# Patient Record
Sex: MALE | Race: WHITE | NOT HISPANIC OR LATINO | Employment: OTHER | URBAN - METROPOLITAN AREA
[De-identification: names, ages, dates, MRNs, and addresses within clinical notes are randomized per-mention and may not be internally consistent; named-entity substitution may affect disease eponyms.]

---

## 2017-01-18 ENCOUNTER — APPOINTMENT (EMERGENCY)
Dept: RADIOLOGY | Facility: HOSPITAL | Age: 57
End: 2017-01-18

## 2017-01-18 ENCOUNTER — HOSPITAL ENCOUNTER (EMERGENCY)
Facility: HOSPITAL | Age: 57
Discharge: HOME | End: 2017-01-18
Attending: EMERGENCY MEDICINE

## 2017-01-18 VITALS
HEIGHT: 72 IN | BODY MASS INDEX: 20.99 KG/M2 | OXYGEN SATURATION: 97 % | RESPIRATION RATE: 18 BRPM | TEMPERATURE: 97.8 F | SYSTOLIC BLOOD PRESSURE: 144 MMHG | DIASTOLIC BLOOD PRESSURE: 88 MMHG | WEIGHT: 155 LBS | HEART RATE: 78 BPM

## 2017-01-18 DIAGNOSIS — R55 SYNCOPE: Primary | ICD-10-CM

## 2017-01-18 DIAGNOSIS — M54.9 BACK PAIN: ICD-10-CM

## 2017-01-18 DIAGNOSIS — K74.60 CIRRHOSIS (HCC): ICD-10-CM

## 2017-01-18 LAB
ANION GAP SERPL CALCULATED.3IONS-SCNC: 10 MMOL/L (ref 4–13)
ATRIAL RATE: 78 BPM
BASOPHILS # BLD AUTO: 0.05 THOUSANDS/ΜL (ref 0–0.1)
BASOPHILS NFR BLD AUTO: 1 % (ref 0–1)
BUN SERPL-MCNC: 8 MG/DL (ref 5–25)
CALCIUM SERPL-MCNC: 8.9 MG/DL (ref 8.3–10.1)
CHLORIDE SERPL-SCNC: 108 MMOL/L (ref 100–108)
CO2 SERPL-SCNC: 24 MMOL/L (ref 21–32)
CREAT SERPL-MCNC: 0.72 MG/DL (ref 0.6–1.3)
EOSINOPHIL # BLD AUTO: 0.09 THOUSAND/ΜL (ref 0–0.61)
EOSINOPHIL NFR BLD AUTO: 2 % (ref 0–6)
ERYTHROCYTE [DISTWIDTH] IN BLOOD BY AUTOMATED COUNT: 14.1 % (ref 11.6–15.1)
GFR SERPL CREATININE-BSD FRML MDRD: >60 ML/MIN/1.73SQ M
GLUCOSE SERPL-MCNC: 124 MG/DL (ref 65–140)
HCT VFR BLD AUTO: 39.8 % (ref 36.5–49.3)
HGB BLD-MCNC: 13.8 G/DL (ref 12–17)
LYMPHOCYTES # BLD AUTO: 2.36 THOUSANDS/ΜL (ref 0.6–4.47)
LYMPHOCYTES NFR BLD AUTO: 43 % (ref 14–44)
MCH RBC QN AUTO: 34.6 PG (ref 26.8–34.3)
MCHC RBC AUTO-ENTMCNC: 34.7 G/DL (ref 31.4–37.4)
MCV RBC AUTO: 100 FL (ref 82–98)
MONOCYTES # BLD AUTO: 0.66 THOUSAND/ΜL (ref 0.17–1.22)
MONOCYTES NFR BLD AUTO: 12 % (ref 4–12)
NEUTROPHILS # BLD AUTO: 2.38 THOUSANDS/ΜL (ref 1.85–7.62)
NEUTS SEG NFR BLD AUTO: 42 % (ref 43–75)
NRBC BLD AUTO-RTO: 0 /100 WBCS
P AXIS: 37 DEGREES
PLATELET # BLD AUTO: 120 THOUSANDS/UL (ref 149–390)
PMV BLD AUTO: 10.3 FL (ref 8.9–12.7)
POTASSIUM SERPL-SCNC: 3.3 MMOL/L (ref 3.5–5.3)
PR INTERVAL: 154 MS
QRS AXIS: 11 DEGREES
QRSD INTERVAL: 94 MS
QT INTERVAL: 398 MS
QTC INTERVAL: 453 MS
RBC # BLD AUTO: 3.99 MILLION/UL (ref 3.88–5.62)
SODIUM SERPL-SCNC: 142 MMOL/L (ref 136–145)
SPECIMEN SOURCE: NORMAL
T WAVE AXIS: 49 DEGREES
TROPONIN I BLD-MCNC: 0 NG/ML (ref 0–0.08)
VENTRICULAR RATE: 78 BPM
WBC # BLD AUTO: 5.55 THOUSAND/UL (ref 4.31–10.16)

## 2017-01-18 PROCEDURE — 96374 THER/PROPH/DIAG INJ IV PUSH: CPT

## 2017-01-18 PROCEDURE — 74177 CT ABD & PELVIS W/CONTRAST: CPT

## 2017-01-18 PROCEDURE — 36415 COLL VENOUS BLD VENIPUNCTURE: CPT

## 2017-01-18 PROCEDURE — 80048 BASIC METABOLIC PNL TOTAL CA: CPT

## 2017-01-18 PROCEDURE — 93005 ELECTROCARDIOGRAM TRACING: CPT

## 2017-01-18 PROCEDURE — 71020 HB CHEST X-RAY 2VW FRONTAL&LATL: CPT

## 2017-01-18 PROCEDURE — 85025 COMPLETE CBC W/AUTO DIFF WBC: CPT

## 2017-01-18 PROCEDURE — 99285 EMERGENCY DEPT VISIT HI MDM: CPT

## 2017-01-18 PROCEDURE — 70450 CT HEAD/BRAIN W/O DYE: CPT

## 2017-01-18 PROCEDURE — 84484 ASSAY OF TROPONIN QUANT: CPT

## 2017-01-18 PROCEDURE — 72125 CT NECK SPINE W/O DYE: CPT

## 2017-01-18 RX ORDER — KETOROLAC TROMETHAMINE 30 MG/ML
15 INJECTION, SOLUTION INTRAMUSCULAR; INTRAVENOUS ONCE
Status: COMPLETED | OUTPATIENT
Start: 2017-01-18 | End: 2017-01-18

## 2017-01-18 RX ADMIN — KETOROLAC TROMETHAMINE 15 MG: 30 INJECTION, SOLUTION INTRAMUSCULAR at 16:36

## 2017-01-18 RX ADMIN — IOHEXOL 100 ML: 350 INJECTION, SOLUTION INTRAVENOUS at 15:37

## 2018-03-08 ENCOUNTER — APPOINTMENT (INPATIENT)
Dept: RADIOLOGY | Facility: HOSPITAL | Age: 58
DRG: 369 | End: 2018-03-08
Payer: OTHER GOVERNMENT

## 2018-03-08 ENCOUNTER — APPOINTMENT (INPATIENT)
Dept: NON INVASIVE DIAGNOSTICS | Facility: HOSPITAL | Age: 58
DRG: 369 | End: 2018-03-08
Payer: OTHER GOVERNMENT

## 2018-03-08 ENCOUNTER — APPOINTMENT (EMERGENCY)
Dept: RADIOLOGY | Facility: HOSPITAL | Age: 58
DRG: 369 | End: 2018-03-08
Payer: OTHER GOVERNMENT

## 2018-03-08 ENCOUNTER — ANESTHESIA (INPATIENT)
Dept: GASTROENTEROLOGY | Facility: HOSPITAL | Age: 58
DRG: 369 | End: 2018-03-08
Payer: OTHER GOVERNMENT

## 2018-03-08 ENCOUNTER — HOSPITAL ENCOUNTER (INPATIENT)
Facility: HOSPITAL | Age: 58
LOS: 2 days | Discharge: HOME | DRG: 369 | End: 2018-03-10
Attending: EMERGENCY MEDICINE | Admitting: INTERNAL MEDICINE
Payer: OTHER GOVERNMENT

## 2018-03-08 DIAGNOSIS — D62 ACUTE BLOOD LOSS ANEMIA: Primary | ICD-10-CM

## 2018-03-08 DIAGNOSIS — I20.9 ANGINA PECTORIS (HCC): ICD-10-CM

## 2018-03-08 DIAGNOSIS — F17.200 NICOTINE DEPENDENCE: Chronic | ICD-10-CM

## 2018-03-08 DIAGNOSIS — K92.2 GI BLEED: ICD-10-CM

## 2018-03-08 DIAGNOSIS — K74.60 HEPATIC CIRRHOSIS (HCC): ICD-10-CM

## 2018-03-08 PROBLEM — B18.2 CHRONIC HEPATITIS C VIRUS INFECTION (HCC): Status: ACTIVE | Noted: 2018-03-08

## 2018-03-08 PROBLEM — R65.10 SIRS (SYSTEMIC INFLAMMATORY RESPONSE SYNDROME) (HCC): Status: ACTIVE | Noted: 2018-03-08

## 2018-03-08 PROBLEM — B18.2 CHRONIC HEPATITIS C VIRUS INFECTION (HCC): Chronic | Status: ACTIVE | Noted: 2018-03-08

## 2018-03-08 PROBLEM — M79.89 LEG SWELLING: Status: ACTIVE | Noted: 2018-03-08

## 2018-03-08 PROBLEM — R07.9 CHEST PAIN: Status: ACTIVE | Noted: 2018-03-08

## 2018-03-08 PROBLEM — D50.0 BLOOD LOSS ANEMIA: Status: ACTIVE | Noted: 2018-03-08

## 2018-03-08 LAB
ABO GROUP BLD BPU: NORMAL
ABO GROUP BLD: NORMAL
ALBUMIN SERPL BCP-MCNC: 2.7 G/DL (ref 3.5–5)
ALP SERPL-CCNC: 137 U/L (ref 46–116)
ALT SERPL W P-5'-P-CCNC: 80 U/L (ref 12–78)
AMPHETAMINES SERPL QL SCN: POSITIVE
ANION GAP BLD CALC-SCNC: 13 MMOL/L (ref 4–13)
ANION GAP SERPL CALCULATED.3IONS-SCNC: 4 MMOL/L (ref 4–13)
ANION GAP SERPL CALCULATED.3IONS-SCNC: 7 MMOL/L (ref 4–13)
ANION GAP SERPL CALCULATED.3IONS-SCNC: 9 MMOL/L (ref 4–13)
APTT PPP: 28 SECONDS (ref 23–35)
AST SERPL W P-5'-P-CCNC: 104 U/L (ref 5–45)
ATRIAL RATE: 71 BPM
ATRIAL RATE: 95 BPM
BARBITURATES UR QL: NEGATIVE
BASOPHILS # BLD AUTO: 0.03 THOUSANDS/ΜL (ref 0–0.1)
BASOPHILS NFR BLD AUTO: 1 % (ref 0–1)
BENZODIAZ UR QL: NEGATIVE
BILIRUB SERPL-MCNC: 0.35 MG/DL (ref 0.2–1)
BLD GP AB SCN SERPL QL: NEGATIVE
BPU ID: NORMAL
BUN BLD-MCNC: 28 MG/DL (ref 5–25)
BUN SERPL-MCNC: 14 MG/DL (ref 5–25)
BUN SERPL-MCNC: 20 MG/DL (ref 5–25)
BUN SERPL-MCNC: 23 MG/DL (ref 5–25)
CA-I BLD-SCNC: 1.14 MMOL/L (ref 1.12–1.32)
CALCIUM SERPL-MCNC: 7.5 MG/DL (ref 8.3–10.1)
CALCIUM SERPL-MCNC: 8 MG/DL (ref 8.3–10.1)
CALCIUM SERPL-MCNC: 8.3 MG/DL (ref 8.3–10.1)
CHLORIDE BLD-SCNC: 104 MMOL/L (ref 100–108)
CHLORIDE SERPL-SCNC: 106 MMOL/L (ref 100–108)
CHLORIDE SERPL-SCNC: 106 MMOL/L (ref 100–108)
CHLORIDE SERPL-SCNC: 108 MMOL/L (ref 100–108)
CHOLEST SERPL-MCNC: 95 MG/DL (ref 50–200)
CO2 SERPL-SCNC: 22 MMOL/L (ref 21–32)
CO2 SERPL-SCNC: 24 MMOL/L (ref 21–32)
CO2 SERPL-SCNC: 25 MMOL/L (ref 21–32)
COCAINE UR QL: NEGATIVE
CREAT BLD-MCNC: 0.9 MG/DL (ref 0.6–1.3)
CREAT SERPL-MCNC: 0.8 MG/DL (ref 0.6–1.3)
CREAT SERPL-MCNC: 0.84 MG/DL (ref 0.6–1.3)
CREAT SERPL-MCNC: 0.94 MG/DL (ref 0.6–1.3)
CROSSMATCH: NORMAL
EOSINOPHIL # BLD AUTO: 0.1 THOUSAND/ΜL (ref 0–0.61)
EOSINOPHIL NFR BLD AUTO: 2 % (ref 0–6)
ERYTHROCYTE [DISTWIDTH] IN BLOOD BY AUTOMATED COUNT: 16.1 % (ref 11.6–15.1)
ERYTHROCYTE [DISTWIDTH] IN BLOOD BY AUTOMATED COUNT: 16.8 % (ref 11.6–15.1)
GFR SERPL CREATININE-BSD FRML MDRD: 100 ML/MIN/1.73SQ M
GFR SERPL CREATININE-BSD FRML MDRD: 90 ML/MIN/1.73SQ M
GFR SERPL CREATININE-BSD FRML MDRD: 95 ML/MIN/1.73SQ M
GFR SERPL CREATININE-BSD FRML MDRD: 98 ML/MIN/1.73SQ M
GLUCOSE SERPL-MCNC: 103 MG/DL (ref 65–140)
GLUCOSE SERPL-MCNC: 106 MG/DL (ref 65–140)
GLUCOSE SERPL-MCNC: 110 MG/DL (ref 65–140)
GLUCOSE SERPL-MCNC: 115 MG/DL (ref 65–140)
HCT VFR BLD AUTO: 17 % (ref 36.5–49.3)
HCT VFR BLD AUTO: 19 % (ref 36.5–49.3)
HCT VFR BLD AUTO: 20.2 % (ref 36.5–49.3)
HCT VFR BLD AUTO: 21.6 % (ref 36.5–49.3)
HCT VFR BLD CALC: 15 % (ref 36.5–49.3)
HDLC SERPL-MCNC: 31 MG/DL (ref 40–60)
HGB BLD-MCNC: 5.5 G/DL (ref 12–17)
HGB BLD-MCNC: 6.1 G/DL (ref 12–17)
HGB BLD-MCNC: 6.8 G/DL (ref 12–17)
HGB BLD-MCNC: 7.1 G/DL (ref 12–17)
HGB BLDA-MCNC: 5.1 G/DL (ref 12–17)
INR PPP: 1.19 (ref 0.86–1.16)
LDLC SERPL CALC-MCNC: 46 MG/DL (ref 0–100)
LYMPHOCYTES # BLD AUTO: 2.48 THOUSANDS/ΜL (ref 0.6–4.47)
LYMPHOCYTES NFR BLD AUTO: 39 % (ref 14–44)
MAGNESIUM SERPL-MCNC: 1.8 MG/DL (ref 1.6–2.6)
MCH RBC QN AUTO: 27.2 PG (ref 26.8–34.3)
MCH RBC QN AUTO: 27.2 PG (ref 26.8–34.3)
MCHC RBC AUTO-ENTMCNC: 32.1 G/DL (ref 31.4–37.4)
MCHC RBC AUTO-ENTMCNC: 32.4 G/DL (ref 31.4–37.4)
MCV RBC AUTO: 84 FL (ref 82–98)
MCV RBC AUTO: 85 FL (ref 82–98)
METHADONE UR QL: NEGATIVE
MONOCYTES # BLD AUTO: 0.91 THOUSAND/ΜL (ref 0.17–1.22)
MONOCYTES NFR BLD AUTO: 14 % (ref 4–12)
NEUTROPHILS # BLD AUTO: 2.82 THOUSANDS/ΜL (ref 1.85–7.62)
NEUTS SEG NFR BLD AUTO: 44 % (ref 43–75)
NRBC BLD AUTO-RTO: 0 /100 WBCS
NT-PROBNP SERPL-MCNC: 348 PG/ML
OPIATES UR QL SCN: NEGATIVE
P AXIS: 66 DEGREES
P AXIS: 80 DEGREES
PCO2 BLD: 25 MMOL/L (ref 21–32)
PCP UR QL: NEGATIVE
PLATELET # BLD AUTO: 148 THOUSANDS/UL (ref 149–390)
PLATELET # BLD AUTO: 163 THOUSANDS/UL (ref 149–390)
PMV BLD AUTO: 8.7 FL (ref 8.9–12.7)
PMV BLD AUTO: 9.3 FL (ref 8.9–12.7)
POTASSIUM BLD-SCNC: 4.3 MMOL/L (ref 3.5–5.3)
POTASSIUM SERPL-SCNC: 3.6 MMOL/L (ref 3.5–5.3)
POTASSIUM SERPL-SCNC: 3.6 MMOL/L (ref 3.5–5.3)
POTASSIUM SERPL-SCNC: 3.8 MMOL/L (ref 3.5–5.3)
PR INTERVAL: 132 MS
PR INTERVAL: 144 MS
PROT SERPL-MCNC: 6.9 G/DL (ref 6.4–8.2)
PROTHROMBIN TIME: 15.2 SECONDS (ref 12.1–14.4)
QRS AXIS: 52 DEGREES
QRS AXIS: 66 DEGREES
QRSD INTERVAL: 92 MS
QRSD INTERVAL: 96 MS
QT INTERVAL: 372 MS
QT INTERVAL: 428 MS
QTC INTERVAL: 465 MS
QTC INTERVAL: 467 MS
RBC # BLD AUTO: 2.02 MILLION/UL (ref 3.88–5.62)
RBC # BLD AUTO: 2.24 MILLION/UL (ref 3.88–5.62)
RH BLD: POSITIVE
SODIUM BLD-SCNC: 138 MMOL/L (ref 136–145)
SODIUM SERPL-SCNC: 137 MMOL/L (ref 136–145)
SPECIMEN EXPIRATION DATE: NORMAL
SPECIMEN SOURCE: ABNORMAL
T WAVE AXIS: 52 DEGREES
T WAVE AXIS: 60 DEGREES
THC UR QL: NEGATIVE
TRIGL SERPL-MCNC: 89 MG/DL
TROPONIN I SERPL-MCNC: 0.03 NG/ML
TROPONIN I SERPL-MCNC: 0.04 NG/ML
TROPONIN I SERPL-MCNC: 0.05 NG/ML
UNIT DISPENSE STATUS: NORMAL
UNIT PRODUCT CODE: NORMAL
UNIT RH: NORMAL
VENTRICULAR RATE: 71 BPM
VENTRICULAR RATE: 95 BPM
WBC # BLD AUTO: 6.36 THOUSAND/UL (ref 4.31–10.16)
WBC # BLD AUTO: 7.88 THOUSAND/UL (ref 4.31–10.16)

## 2018-03-08 PROCEDURE — 85014 HEMATOCRIT: CPT

## 2018-03-08 PROCEDURE — 80048 BASIC METABOLIC PNL TOTAL CA: CPT | Performed by: INTERNAL MEDICINE

## 2018-03-08 PROCEDURE — 86920 COMPATIBILITY TEST SPIN: CPT

## 2018-03-08 PROCEDURE — 83880 ASSAY OF NATRIURETIC PEPTIDE: CPT | Performed by: EMERGENCY MEDICINE

## 2018-03-08 PROCEDURE — 43255 EGD CONTROL BLEEDING ANY: CPT | Performed by: INTERNAL MEDICINE

## 2018-03-08 PROCEDURE — 93005 ELECTROCARDIOGRAM TRACING: CPT

## 2018-03-08 PROCEDURE — 84484 ASSAY OF TROPONIN QUANT: CPT | Performed by: INTERNAL MEDICINE

## 2018-03-08 PROCEDURE — 99222 1ST HOSP IP/OBS MODERATE 55: CPT | Performed by: INTERNAL MEDICINE

## 2018-03-08 PROCEDURE — 82272 OCCULT BLD FECES 1-3 TESTS: CPT

## 2018-03-08 PROCEDURE — 93005 ELECTROCARDIOGRAM TRACING: CPT | Performed by: INTERNAL MEDICINE

## 2018-03-08 PROCEDURE — 85610 PROTHROMBIN TIME: CPT | Performed by: EMERGENCY MEDICINE

## 2018-03-08 PROCEDURE — 0W3P8ZZ CONTROL BLEEDING IN GASTROINTESTINAL TRACT, VIA NATURAL OR ARTIFICIAL OPENING ENDOSCOPIC: ICD-10-PCS | Performed by: INTERNAL MEDICINE

## 2018-03-08 PROCEDURE — 93306 TTE W/DOPPLER COMPLETE: CPT

## 2018-03-08 PROCEDURE — 85027 COMPLETE CBC AUTOMATED: CPT | Performed by: INTERNAL MEDICINE

## 2018-03-08 PROCEDURE — 80307 DRUG TEST PRSMV CHEM ANLYZR: CPT | Performed by: INTERNAL MEDICINE

## 2018-03-08 PROCEDURE — 36415 COLL VENOUS BLD VENIPUNCTURE: CPT | Performed by: EMERGENCY MEDICINE

## 2018-03-08 PROCEDURE — 80053 COMPREHEN METABOLIC PANEL: CPT | Performed by: EMERGENCY MEDICINE

## 2018-03-08 PROCEDURE — 76706 US ABDL AORTA SCREEN AAA: CPT

## 2018-03-08 PROCEDURE — C9113 INJ PANTOPRAZOLE SODIUM, VIA: HCPCS | Performed by: INTERNAL MEDICINE

## 2018-03-08 PROCEDURE — 85025 COMPLETE CBC W/AUTO DIFF WBC: CPT | Performed by: EMERGENCY MEDICINE

## 2018-03-08 PROCEDURE — 80061 LIPID PANEL: CPT | Performed by: INTERNAL MEDICINE

## 2018-03-08 PROCEDURE — 85730 THROMBOPLASTIN TIME PARTIAL: CPT | Performed by: EMERGENCY MEDICINE

## 2018-03-08 PROCEDURE — 36415 COLL VENOUS BLD VENIPUNCTURE: CPT

## 2018-03-08 PROCEDURE — 85014 HEMATOCRIT: CPT | Performed by: INTERNAL MEDICINE

## 2018-03-08 PROCEDURE — 86901 BLOOD TYPING SEROLOGIC RH(D): CPT | Performed by: EMERGENCY MEDICINE

## 2018-03-08 PROCEDURE — 99285 EMERGENCY DEPT VISIT HI MDM: CPT

## 2018-03-08 PROCEDURE — 71046 X-RAY EXAM CHEST 2 VIEWS: CPT

## 2018-03-08 PROCEDURE — 86900 BLOOD TYPING SEROLOGIC ABO: CPT | Performed by: EMERGENCY MEDICINE

## 2018-03-08 PROCEDURE — 30233N1 TRANSFUSION OF NONAUTOLOGOUS RED BLOOD CELLS INTO PERIPHERAL VEIN, PERCUTANEOUS APPROACH: ICD-10-PCS | Performed by: INTERNAL MEDICINE

## 2018-03-08 PROCEDURE — 83735 ASSAY OF MAGNESIUM: CPT | Performed by: INTERNAL MEDICINE

## 2018-03-08 PROCEDURE — P9021 RED BLOOD CELLS UNIT: HCPCS

## 2018-03-08 PROCEDURE — 84484 ASSAY OF TROPONIN QUANT: CPT | Performed by: EMERGENCY MEDICINE

## 2018-03-08 PROCEDURE — 0DB58ZX EXCISION OF ESOPHAGUS, VIA NATURAL OR ARTIFICIAL OPENING ENDOSCOPIC, DIAGNOSTIC: ICD-10-PCS | Performed by: INTERNAL MEDICINE

## 2018-03-08 PROCEDURE — 80047 BASIC METABLC PNL IONIZED CA: CPT

## 2018-03-08 PROCEDURE — 36430 TRANSFUSION BLD/BLD COMPNT: CPT

## 2018-03-08 PROCEDURE — 86850 RBC ANTIBODY SCREEN: CPT | Performed by: EMERGENCY MEDICINE

## 2018-03-08 PROCEDURE — 85018 HEMOGLOBIN: CPT | Performed by: INTERNAL MEDICINE

## 2018-03-08 RX ORDER — PANTOPRAZOLE SODIUM 40 MG/1
40 INJECTION, POWDER, FOR SOLUTION INTRAVENOUS EVERY 12 HOURS SCHEDULED
Status: DISCONTINUED | OUTPATIENT
Start: 2018-03-08 | End: 2018-03-08

## 2018-03-08 RX ORDER — PROPOFOL 10 MG/ML
INJECTION, EMULSION INTRAVENOUS AS NEEDED
Status: DISCONTINUED | OUTPATIENT
Start: 2018-03-08 | End: 2018-03-08 | Stop reason: SURG

## 2018-03-08 RX ORDER — SODIUM CHLORIDE 9 MG/ML
50 INJECTION, SOLUTION INTRAVENOUS CONTINUOUS
Status: DISCONTINUED | OUTPATIENT
Start: 2018-03-08 | End: 2018-03-08

## 2018-03-08 RX ORDER — EPINEPHRINE 1 MG/ML
INJECTION, SOLUTION, CONCENTRATE INTRAVENOUS AS NEEDED
Status: DISCONTINUED | OUTPATIENT
Start: 2018-03-08 | End: 2018-03-08 | Stop reason: HOSPADM

## 2018-03-08 RX ORDER — ASPIRIN 81 MG/1
81 TABLET, CHEWABLE ORAL DAILY
Status: DISCONTINUED | OUTPATIENT
Start: 2018-03-09 | End: 2018-03-08

## 2018-03-08 RX ORDER — NADOLOL 20 MG/1
10 TABLET ORAL DAILY
Status: DISCONTINUED | OUTPATIENT
Start: 2018-03-08 | End: 2018-03-10 | Stop reason: HOSPADM

## 2018-03-08 RX ORDER — ATORVASTATIN CALCIUM 40 MG/1
40 TABLET, FILM COATED ORAL
Status: DISCONTINUED | OUTPATIENT
Start: 2018-03-08 | End: 2018-03-09

## 2018-03-08 RX ORDER — LIDOCAINE HYDROCHLORIDE 10 MG/ML
INJECTION, SOLUTION INFILTRATION; PERINEURAL AS NEEDED
Status: DISCONTINUED | OUTPATIENT
Start: 2018-03-08 | End: 2018-03-08 | Stop reason: SURG

## 2018-03-08 RX ORDER — NICOTINE 21 MG/24HR
14 PATCH, TRANSDERMAL 24 HOURS TRANSDERMAL DAILY
Status: DISCONTINUED | OUTPATIENT
Start: 2018-03-09 | End: 2018-03-10 | Stop reason: HOSPADM

## 2018-03-08 RX ORDER — NITROGLYCERIN 0.4 MG/1
0.4 TABLET SUBLINGUAL
Status: DISCONTINUED | OUTPATIENT
Start: 2018-03-08 | End: 2018-03-10 | Stop reason: HOSPADM

## 2018-03-08 RX ORDER — SUCCINYLCHOLINE CHLORIDE 20 MG/ML
INJECTION INTRAMUSCULAR; INTRAVENOUS AS NEEDED
Status: DISCONTINUED | OUTPATIENT
Start: 2018-03-08 | End: 2018-03-08 | Stop reason: SURG

## 2018-03-08 RX ORDER — CIPROFLOXACIN 2 MG/ML
400 INJECTION, SOLUTION INTRAVENOUS EVERY 12 HOURS
Status: DISCONTINUED | OUTPATIENT
Start: 2018-03-08 | End: 2018-03-08

## 2018-03-08 RX ORDER — ACETAMINOPHEN 325 MG/1
650 TABLET ORAL EVERY 6 HOURS PRN
Status: DISCONTINUED | OUTPATIENT
Start: 2018-03-08 | End: 2018-03-08

## 2018-03-08 RX ORDER — ONDANSETRON 2 MG/ML
INJECTION INTRAMUSCULAR; INTRAVENOUS AS NEEDED
Status: DISCONTINUED | OUTPATIENT
Start: 2018-03-08 | End: 2018-03-08 | Stop reason: SURG

## 2018-03-08 RX ORDER — ACETAMINOPHEN 325 MG/1
650 TABLET ORAL EVERY 8 HOURS PRN
Status: DISCONTINUED | OUTPATIENT
Start: 2018-03-08 | End: 2018-03-10 | Stop reason: HOSPADM

## 2018-03-08 RX ADMIN — SODIUM CHLORIDE: 0.9 INJECTION, SOLUTION INTRAVENOUS at 13:31

## 2018-03-08 RX ADMIN — ACETAMINOPHEN 650 MG: 325 TABLET, FILM COATED ORAL at 03:35

## 2018-03-08 RX ADMIN — CEFTRIAXONE 1000 MG: 1 INJECTION, SOLUTION INTRAVENOUS at 18:30

## 2018-03-08 RX ADMIN — ATORVASTATIN CALCIUM 40 MG: 40 TABLET, FILM COATED ORAL at 17:17

## 2018-03-08 RX ADMIN — PANTOPRAZOLE SODIUM 40 MG: 40 INJECTION, POWDER, FOR SOLUTION INTRAVENOUS at 08:48

## 2018-03-08 RX ADMIN — CIPROFLOXACIN 400 MG: 2 INJECTION, SOLUTION INTRAVENOUS at 08:48

## 2018-03-08 RX ADMIN — NADOLOL 10 MG: 20 TABLET ORAL at 18:53

## 2018-03-08 RX ADMIN — SUCCINYLCHOLINE CHLORIDE 100 MG: 20 INJECTION, SOLUTION INTRAMUSCULAR; INTRAVENOUS at 13:53

## 2018-03-08 RX ADMIN — LIDOCAINE HYDROCHLORIDE 50 MG: 10 INJECTION, SOLUTION INFILTRATION; PERINEURAL at 13:53

## 2018-03-08 RX ADMIN — PROPOFOL 200 MG: 10 INJECTION, EMULSION INTRAVENOUS at 13:53

## 2018-03-08 RX ADMIN — ONDANSETRON 4 MG: 2 INJECTION INTRAMUSCULAR; INTRAVENOUS at 13:56

## 2018-03-08 RX ADMIN — DEXAMETHASONE SODIUM PHOSPHATE 10 MG: 10 INJECTION INTRAMUSCULAR; INTRAVENOUS at 13:56

## 2018-03-08 RX ADMIN — SODIUM CHLORIDE 8 MG/HR: 9 INJECTION, SOLUTION INTRAVENOUS at 17:17

## 2018-03-08 NOTE — CONSULTS
Consultation -  [facility]  Molina Marshall 65 y o  male MRN: [id number]  Unit/Bed#: SPR 2 Encounter: [id number]        ASSESSMENT/PLAN:   GI bleed  Hep C & alcoholic cirrhosis  Screening colon    1  GI bleed - he presented to ER b/c of SOB & was found to be anemic & heme +  He reports sxs over the past week, Hbg 1 yr ago WNL's  BUN WNL's  Likely bled previously & has since stopped  Hbg on admission 5 5 -> 2 units -> 6 8  He is to receive 1 more  I would recommend EGD for further evaluation  Unlikely this is from EV, probably gastritis, PUD  -Continue Protonix drip  -Continue Abx  -NPO  -EGD later today    2  Hep C & alcoholic cirrhosis- he has had no f/u & continues to drink  MELD 11  No ascites or HE on exam  Labs consistent with cirrhosis  Hep C viral load pending  US pending   -Alcohol abstinence - watch for withdraw  -Outpt f/u for possible Hep C workup  -Follow-up US    3  Screening colon - he has never had a screening colon & has a FH of colon CA  Likely this can be done as outpt but if EGD is (-) can consider into workup  Consults    Reason for Consult / Principal Problem: Blood loss anemia    HPI: Molina Marshall is a 65y o  year old male with a PMH of Hep C (not treated), alcoholic cirrhosis who was admitted with SOB & lower extremity edema  He states the SOB is with exertion  He has noticed it worsening over the past week  He states he was told he has cirrhosis last yr during an admission for another reason  He states he has not seen anyone for this since then  He continues to drink & his last drink was yesterday 1 beer & 1 shot  He denies increasing abdominal distention, no episodes of confusion  He states he has noticed black stool over the past week as well  No BRBPR  No abd pain  He had 1 episode of N/V 1 week ago & did see some red blood, but has had no further episodes  Heme + in ER  He has never had an EGD or colonoscopy  He states his mother had colon CA         Review of Systems: as per HPI  Review of Systems   Cardiovascular: Negative for chest pain  All other systems reviewed and are negative  Historical Information   Past Medical History:   Diagnosis Date    Back pain, chronic     Cirrhosis ([address] )     Hepatitis      History reviewed  No pertinent surgical history  Social History   History   Alcohol Use    Yes     Comment: socially     History   Drug Use No     History   Smoking Status    Current Every Day Smoker    Packs/day: 0 50   Smokeless Tobacco    Not on file     History reviewed  No pertinent family history  Meds/Allergies       (Not in a hospital admission)  Current Facility-Administered Medications   Medication Dose Route Frequency    acetaminophen (TYLENOL) tablet 650 mg  650 mg Oral Q8H PRN    atorvastatin (LIPITOR) tablet 40 mg  40 mg Oral Daily With Dinner    ciprofloxacin (CIPRO) IVPB (premix) 400 mg  400 mg Intravenous Q12H    nitroglycerin (NITROSTAT) SL tablet 0 4 mg  0 4 mg Sublingual Q5 Min PRN    pantoprazole (PROTONIX) injection 40 mg  40 mg Intravenous Q12H [facility]    sodium chloride 0 9 % infusion  50 mL/hr Intravenous Continuous       No Known Allergies    Objective     Blood pressure 146/80, pulse 81, temperature 98 °F (36 7 °C), temperature source Oral, resp  rate 18, weight 72 6 kg (160 lb), SpO2 98 %  Intake/Output Summary (Last 24 hours) at 03/08/18 1021  Last data filed at 03/08/18 0617   Gross per 24 hour   Intake              700 ml   Output                0 ml   Net              700 ml       PHYSICAL EXAM     Physical Exam   Constitutional: He is oriented to person, place, and time  He appears well-developed and well-nourished  No distress  HENT:   Head: Normocephalic and atraumatic  Eyes: Conjunctivae and EOM are normal    Neck: Normal range of motion  Cardiovascular: Normal rate and regular rhythm  Pulmonary/Chest: Effort normal and breath sounds normal    Abdominal: Soft   Bowel sounds are normal  He exhibits no distension  There is no tenderness  Musculoskeletal: Normal range of motion  He exhibits edema (trace)  Neurological: He is alert and oriented to person, place, and time  Skin: Skin is warm and dry  Psychiatric: He has a normal mood and affect  His behavior is normal        Lab Results:   CBC: Lab Results   Component Value Date    WBC 7 88 03/08/2018    HGB 6 8 (LL) 03/08/2018    HCT 20 2 (L) 03/08/2018    MCV 85 03/08/2018     (L) 03/08/2018    MCH 27 2 03/08/2018    MCHC 32 1 03/08/2018    RDW 16 1 (H) 03/08/2018    MPV 8 7 (L) 03/08/2018    NRBC 0 03/08/2018   ,   CMP: Lab Results   Component Value Date     03/08/2018    K 3 6 03/08/2018     03/08/2018    CO2 22 03/08/2018    ANIONGAP 9 03/08/2018    BUN 20 03/08/2018    CREATININE 0 84 03/08/2018    GLUCOSE 115 03/08/2018    GLUCOSE 110 03/08/2018    CALCIUM 8 0 (L) 03/08/2018     (H) 03/08/2018    ALT 80 (H) 03/08/2018    ALKPHOS 137 (H) 03/08/2018    PROT 6 9 03/08/2018    BILITOT 0 35 03/08/2018    EGFR 98 03/08/2018    EGFR 95 03/08/2018   ,   Lipase: No results found for: LIPASE,  PT/INR:   Lab Results   Component Value Date    INR 1 19 (H) 03/08/2018   ,   Troponin:   Lab Results   Component Value Date    TROPONINI 0 03 03/08/2018   ,   Imaging Studies: I have personally reviewed pertinent reports        CXR:  No acute cardiopulmonary disease

## 2018-03-08 NOTE — ED NOTES
Patient in Ultrasound and will be transported directly to GI lab after Ultrasound       Beatriz Pa RN  03/08/18 8443

## 2018-03-08 NOTE — H&P
INTERNAL MEDICINE HISTORY AND PHYSICAL  SPR 2 SOD Team A    NAME: Kassie Guzman  AGE: 65 y o  SEX: male  : 1960   MRN: [id number]  ENCOUNTER: [id number]    DATE: 3/8/2018  TIME: 3:16 AM    Primary Care Physician: No primary care provider on file  Admitting Provider: Marnie Mijares DO    Chief complaint:  Acute blood loss anemia/heme-positive stools    History of Present Illness     Dot Avendaño is a 65 y o  male with a past medical history significant for hepatitis-C, cirrhosis, remote history of gastric ulcers, right wrist fracture (status post fixation), presented emergency department for evaluation of bilateral lower extremity edema/shortness of breath with exertion/chest pain for the last approximately 2 weeks  Patient states that while he has been working as a  he has been having increasing shortness of breath  Patient also states that he has a pressure-like sensation on his chest that gets worse with exertion and resolves with rest   Interestingly, the patient had been experiencing increasing fatigue while working and some of his colleagues and recommended utilizing a small dose of methamphetamine is a potential means to increase his energy level  Patient stated that he took this will illicit substance and had no benefit from this  (The patient was immediately counseled on the importance of not repeating this ) Additionally the patient also has been complaining of bilateral lower extremity edema for the last 2 weeks  Patient states the edema is exacerbated by working  Additionally the patient states that he has pain in his lower extremities that is constant but exacerbated when he is trying to lie still and when he is walking  Patient did note 1 episode of dark stools last week but does not remember having any brandee blood in his stools, brandee blood in his urine, patient denies any headaches, changes to his vision, patient has not noticed any easy bruising or bleeding    Patient does affirm some chest pain which she describes a pressure-like sensation that occurs with exertion that resolves with rest and hyperventilation  Patient denies any abdominal pain patient denies any issues with urination/defecation/constipation  While in the emergency department the patient was evaluated with CMP which demonstrated a albumin of 2 7, CBC demonstrated a hemoglobin of 5 5 (patient receiving 2 units of packed red blood cells), INR 1 19, , troponins minimally elevated at 0 05, and a chest x-ray which demonstrated no acute disease processes  Hemoccult was positive for blood  At this point was determined that the patient should be admitted for further evaluation of his anemia  Review of Systems   Review of Systems   Constitutional: Positive for activity change, diaphoresis, fatigue and unexpected weight change  Negative for appetite change, chills and fever  Respiratory: Positive for chest tightness and shortness of breath  Negative for cough, wheezing and stridor  Cardiovascular: Positive for chest pain and leg swelling  Negative for palpitations  Gastrointestinal: Positive for abdominal distention  Negative for abdominal pain, blood in stool, constipation, diarrhea, nausea and vomiting  Genitourinary: Negative for difficulty urinating, dysuria, enuresis, hematuria and urgency  Musculoskeletal: Negative for arthralgias, back pain, gait problem, joint swelling and myalgias  Skin: Negative for color change, pallor and wound  Neurological: Positive for weakness  Negative for dizziness, syncope, light-headedness, numbness and headaches  Hematological: Negative for adenopathy  Does not bruise/bleed easily  Past Medical History     Past Medical History:   Diagnosis Date    Back pain, chronic     Cirrhosis ([facility] [address] )     Hepatitis        Past Surgical History   History reviewed  No pertinent surgical history      Social History     History   Alcohol Use    Yes     Comment: socially History   Drug Use No     History   Smoking Status    Current Every Day Smoker    Packs/day: 0 50   Smokeless Tobacco    Not on file       Family History   History reviewed  No pertinent family history  Medications Prior to Admission     Prior to Admission medications    Not on File       Allergies   No Known Allergies    Objective     Vitals:    03/08/18 0106 03/08/18 0215   BP: 158/71 145/74   BP Location: Left arm Left arm   Pulse: (!) 106 96   Resp: (!) 24 20   Temp: (!) 96 3 °F (35 7 °C)    TempSrc: Tympanic    SpO2: 100% 98%   Weight: 72 6 kg (160 lb)      Body mass index is 21 7 kg/m²  No intake or output data in the 24 hours ending 03/08/18 0316  Invasive Devices     Peripheral Intravenous Line            Peripheral IV 03/08/18 Left Antecubital less than 1 day    Peripheral IV 03/08/18 Right Antecubital less than 1 day                Physical Exam  GENERAL: Adelita Boeck middle-aged male with notable by temporal muscle wasting and sullen faceas  Appears in no acute distress  Minimal distress and anxiety  Mildly icteric appearing   HEENT: Normocephalic and atraumatic  Leah Rafael PERRLA  EOMI B/L  No oropharyngeal edema  MM moist    NECK: Neck supple with no lymphadenopathy  Trachea midline  No JVD  No carotid bruit auscultated   CARDIOVASCULAR: S1 and S2 are present  Regular rate and rhythm  No murmurs, rubs, or gallops  RESPIRATORY: CTA B/L anterior/posterior, no rales, rhonci or wheezes  Normal respiratory expansion  ABDOMINAL: Bowel sounds present in all 4 quadrants, non-tender, soft, non-distended  No organomegaly (liver border palpable 2 finger breathes below right costal margin, rebound, or guarding  EXTREMITIES: 2+ DP and PT pulses bilaterally; no cyanosis, clubbing, edema  ROM intact  MUSCULOSKELETAL: No joint tenderness, deformity or swelling, full range of motion without pain  NEUROLOGIC: Patient is alert and oriented to person, place, and time  No sensory or motor deficits   CN 2-12 intact  Plantars downgoing bilaterally  Speech fluent  SKIN: Skin is warm and dry  No skin lesions are present  No rashes  PSYCHIATRIC: Normal mood and affect     Lab Results: I have personally reviewed pertinent reports  Results Reviewed     Procedure Component Value Units Date/Time    Troponin I [[id number]]     Lab Status:  No result Specimen:  Blood     Magnesium [[id number]] Collected:  03/08/18 0126    Lab Status: In process Specimen:  Blood from Arm, Right Updated:  03/08/18 0302    Lipid Panel with Direct LDL reflex [[id number]] Collected:  03/08/18 0126    Lab Status:   In process Specimen:  Blood from Arm, Right Updated:  03/08/18 0302    POCT Chem 8+ [[id number]]  (Abnormal) Collected:  03/08/18 0204    Lab Status:  Final result Updated:  03/08/18 0209     SODIUM, I-STAT 138 mmol/l      Potassium, i-STAT 4 3 mmol/L      Chloride, istat 104 mmol/L      CO2, i-STAT 25 mmol/L      Anion Gap, Istat 13 mmol/L      Calcium, Ionized i-STAT 1 14 mmol/L      BUN, I-STAT 28 (H) mg/dl      Creatinine, i-STAT 0 9 mg/dl      eGFR 95 ml/min/1 73sq m      Glucose, i-STAT 110 mg/dl      Hct, i-STAT 15 (L) %      Hgb, i-STAT 5 1 (LL) g/dl      Specimen Type VENOUS    Comprehensive metabolic panel [[id number]]  (Abnormal) Collected:  03/08/18 0126    Lab Status:  Final result Specimen:  Blood from Arm, Right Updated:  03/08/18 0154     Sodium 137 mmol/L      Potassium 3 8 mmol/L      Chloride 106 mmol/L      CO2 24 mmol/L      Anion Gap 7 mmol/L      BUN 23 mg/dL      Creatinine 0 94 mg/dL      Glucose 106 mg/dL      Calcium 8 3 mg/dL       (H) U/L      ALT 80 (H) U/L      Alkaline Phosphatase 137 (H) U/L      Total Protein 6 9 g/dL      Albumin 2 7 (L) g/dL      Total Bilirubin 0 35 mg/dL      eGFR 90 ml/min/1 73sq m     Narrative:         National Kidney Disease Education Program recommendations are as follows:  GFR calculation is accurate only with a steady state creatinine  Chronic Kidney disease less than 60 ml/min/1 73 sq  meters  Kidney failure less than 15 ml/min/1 73 sq  meters  B-type natriuretic peptide [[id number]]  (Abnormal) Collected:  03/08/18 0126    Lab Status:  Final result Specimen:  Blood from Arm, Right Updated:  03/08/18 0154     NT-proBNP 348 (H) pg/mL     Troponin I [[id number]]  (Abnormal) Collected:  03/08/18 0126    Lab Status:  Final result Specimen:  Blood from Arm, Right Updated:  03/08/18 0154     Troponin I 0 05 (H) ng/mL     Narrative:         [employer] Chemistry analyzer 99% cutoff is > 0 04 ng/mL in network labs    o cTnI 99% cutoff is useful only when applied to patients in the clinical setting of myocardial ischemia  o cTnI 99% cutoff should be interpreted in the context of clinical history, ECG findings and possibly cardiac imaging to establish correct diagnosis  o cTnI 99% cutoff may be suggestive but clearly not indicative of a coronary event without the clinical setting of myocardial ischemia      CBC and differential [[id number]]  (Abnormal) Collected:  03/08/18 0126    Lab Status:  Final result Specimen:  Blood from Arm, Right Updated:  03/08/18 0152     WBC 6 36 Thousand/uL      RBC 2 02 (L) Million/uL      Hemoglobin 5 5 (LL) g/dL      Hematocrit 17 0 (L) %      MCV 84 fL      MCH 27 2 pg      MCHC 32 4 g/dL      RDW 16 8 (H) %      MPV 9 3 fL      Platelets 428 Thousands/uL      nRBC 0 /100 WBCs      Neutrophils Relative 44 %      Lymphocytes Relative 39 %      Monocytes Relative 14 (H) %      Eosinophils Relative 2 %      Basophils Relative 1 %      Neutrophils Absolute 2 82 Thousands/µL      Lymphocytes Absolute 2 48 Thousands/µL      Monocytes Absolute 0 91 Thousand/µL      Eosinophils Absolute 0 10 Thousand/µL      Basophils Absolute 0 03 Thousands/µL     Protime-INR [[id number]]  (Abnormal) Collected:  03/08/18 0126    Lab Status:  Final result Specimen:  Blood from Arm, Right Updated:  03/08/18 0152     Protime 15 2 (H) seconds      INR 1 19 (H)    APTT [[id number]]  (Normal) Collected:  03/08/18 0126    Lab Status:  Final result Specimen:  Blood from Arm, Right Updated:  03/08/18 0152     PTT 28 seconds     Narrative: Therapeutic Heparin Range = 60-90 seconds            Imaging:X-ray Chest 2 Views    Result Date: 3/8/2018  Impression: No acute cardiopulmonary disease  Workstation performed: [id number]           Urinalysis:       Invalid input(s): URIBILINOGEN     Urine Micro:        EKG, Pathology, and Other Studies: I have personally reviewed pertinent reports  Medications given in Emergency Department       Assessment and Plan     Patient Active Problem List   Diagnosis    Leg swelling    Blood loss anemia    Hepatic cirrhosis (HCC)    GI bleed    Angina pectoris (HCC)    Chronic hepatitis C virus infection (HCC)    Nicotine dependence    SIRS (systemic inflammatory response syndrome) ([facility] [address] )            1  Anemia, hemoglobin of 5 5  Likely multifactorial makes in between GI blood loss verses occult GI malignancy verses bleeding diathesis secondary to liver disease  Hemoccult positive for blood  Patient has remote history of gastric ulcers  Patient additionally states that he had an unexpected weight loss of approximately 20-30 lb while he was taking care of his 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  Consult to GI for further evaluation, potential EGD/colonoscopy  Protonix  NPO  Continue to trend hemoglobin after 2 units of packed red blood cells  Continue to monitor vital signs for hemodynamic stability  Type and screen performed    2  Hepatic cirrhosis, acutely decompensated on chronic  Patient has scan to +1 pitting edema bilateral lower extremities to the level of the knee  Patient has a past medical history significant for hepatitis-C    3    Chest pain, anginal, typical  Patient describes a pressure-like sensation in his chest that is associated with exertion and resolves with rest  Patient stated he has never had symptoms like this in the past  Likely secondary to demand ischemia from anemia with decreased oxygen carrying capacity  Additionally patient has consistent claudication in the lower extremities  Cardiology consult for an echocardiogram to evaluate for systolic function and wall motion abnormalities  EKG demonstrated no signs of ischemia  Troponins were minimally elevated at 0 05    4  Tobacco abuse disorder  Patient has a smoking history of approximately eight pack years stating that he started smoking at the age approximately 37 and smokes 1/2 pack per day  5   GI bleed, acute on chronic  Hemoccult positive  GI consult as above  Largely hemodynamically stable with vital signs of heart rate approximately 100 and SBP in the 140s to 150s    6  Bilateral lower extremity edema  Likely associated with decompensated hepatic failure  Patient would likely benefit from diuresis, will re-evaluate Lasix once patient more established and stable  Monitor strict Is&Os  Repeat BMP in a m  Replete electrolytes as needed    Code Status: Level 1 - Full Code  VTE Pharmacologic Prophylaxis: Reason for no pharmacologic prophylaxis GI bleed  VTE Mechanical Prophylaxis: sequential compression device  Admission Status: INPATIENT     Admission Time  I spent 45 minutes admitting the patient  This involved direct patient contact where I performed a full history and physical, reviewing previous records, and reviewing laboratory and other diagnostic studies      Remedios Durham DO  Internal Medicine  PGY-1

## 2018-03-08 NOTE — ANESTHESIA POSTPROCEDURE EVALUATION
Post-Op Assessment Note      CV Status:  Stable    Mental Status:  Alert and awake    Hydration Status:  Euvolemic    PONV Controlled:  Controlled    Airway Patency:  Patent    Post Op Vitals Reviewed: Yes          Staff: CRNA           /69 (03/08/18 1421)    Temp 99 5 °F (37 5 °C) (03/08/18 1416)    Pulse 102 (03/08/18 1421)   Resp 18 (03/08/18 1421)    SpO2 96 % (03/08/18 1421)

## 2018-03-08 NOTE — PLAN OF CARE
GASTROINTESTINAL - ADULT     Minimal or absence of nausea and/or vomiting Progressing     Maintains or returns to baseline bowel function Progressing     Maintains adequate nutritional intake Progressing     Establish and maintain optimal ostomy function Progressing        INFECTION - ADULT     Absence or prevention of progression during hospitalization Progressing        Knowledge Deficit     Patient/family/caregiver demonstrates understanding of disease process, treatment plan, medications, and discharge instructions Progressing        PAIN - ADULT     Verbalizes/displays adequate comfort level or baseline comfort level Progressing        SAFETY ADULT     Patient will remain free of falls Progressing     Maintain or return to baseline ADL function Progressing     Maintain or return mobility status to optimal level Progressing

## 2018-03-08 NOTE — ED ATTENDING ATTESTATION
I, [address], DO, saw and evaluated the patient  I have discussed the patient with the resident/non-physician practitioner and agree with the resident's/non-physician practitioner's findings, Plan of Care, and MDM as documented in the resident's/non-physician practitioner's note, except where noted  All available labs and Radiology studies were reviewed  At this point I agree with the current assessment done in the Emergency Department  I have conducted an independent evaluation of this patient a history and physical is as follows:    63yo male presents with b/l LE swelling and SOB for 2 weeks  SOB with exertion as well as cp and diaphoresis with exertion  On exam - NAD, heart reg, lung clear, abd sl distended but soft and nt, b/l LE pitting edema    Plan - cardiac labs, cxr, check lft's    Critical Care Time  CritCare Time    Procedures

## 2018-03-08 NOTE — H&P
Please see the progress note from 3/8/2018 at 0225 hours for the complete history and physical, it was erroneously entered as a progress note

## 2018-03-08 NOTE — OP NOTE
**** GI/ENDOSCOPY REPORT ****     PATIENT NAME: Radha Devlin - VISIT ID:  Patient ID: [id number] YOB: 1960     INTRODUCTION: Esophagogastroduodenoscopy - A 65 male patient presents for   an inpatient Esophagogastroduodenoscopy at [address] [place] [address]  INDICATIONS: Recent bleeding  CONSENT: The benefits, risks, and alternatives to the procedure were   discussed and informed consent was obtained from the patient  PREPARATION:  EKG, pulse, pulse oximetry and blood pressure were monitored   throughout the procedure  MEDICATIONS: Anesthesia administered by anesthesiologist      PROCEDURE:  The endoscope was passed without difficulty through the mouth   under direct visualization and advanced to the 2nd portion of the   duodenum  The scope was withdrawn and the mucosa was carefully examined  FINDINGS:   Esophagus: LA Class D esophagitis was found in the esophagus  A 10 mm Trena-Khalil tear was found in the distal third of the   esophagus  It bled on contact  To control bleeding, 1 clip was applied  To   control bleeding, 3 injections of epinephrine was administered; the total   dosage was a 5 cc  Bleeding control was successful  Small varices was   found in the distal third of the esophagus and cardia (GOV-1)  They showed   no bleeding stigmata  Stomach: Mild gastritis was found in the entire   stomach due to bleeding biopsies were not taken  Small GOV-1 were seen the   distal esophagus  Pylorus: The pylorus appeared to be normal,   symmetrical, and patent  Duodenum: The duodenal bulb and 2nd portion of   the duodenum appeared to be normal      COMPLICATIONS: There were no complications  IMPRESSIONS: Esophagitis seen  Trena-Khalil tear found in the distal   third of the esophagus  Epinephrine injection therapy was applied to   control bleeding  Clips were applied to control bleeding   Small varices   found in the distal third of the esophagus and cardia  Gastritis found in   the entire stomach  Normal, symmetrical, and patent pylorus  Normal   duodenal bulb and 2nd portion of the duodenum  RECOMMENDATIONS:  Start ceftriaxone 1g daily for 5 days  Continue PPi gtt   for 48 hours  Start clears  EGD in 1years unless there is decompensation   from cirrhosis  Start 10 mg of Nadolol for varices  ESTIMATED BLOOD LOSS:     PATHOLOGY SPECIMENS:     PROCEDURE CODES: 60053 - EGD flexible; with control of bleeding     ICD-9 Codes: 459 0 Hemorrhage, unspecified 530 10 Esophagitis, unspecified   530 7 Gastroesophageal laceration-hemorrhage syndrome 535 50 Unspecified   gastritides and gastroduodenitis, without mention of hemorrhage     ICD-10 Codes: K92 2 Gastrointestinal hemorrhage, unspecified K20 9   Esophagitis, unspecified K22 6 Gastro-esophageal laceration-hemorrhage   syndrome I85 0 Esophageal varices K29 Gastritis and duodenitis     PERFORMED BY: LEI Buckley  on 03/08/2018  Version 1, electronically signed by LEI Xie  on 03/08/2018 at   14:17

## 2018-03-08 NOTE — ED PROVIDER NOTES
History  Chief Complaint   Patient presents with    Leg Swelling     bilateral leg swelling and SOB for 2 weeks,  SOB with exertion, chest pain, diaphoresis, no nausea, dizziness     HPI   64 yo male with hx of liver cirrhosis presenting for SOB and leg swelling  Patient states his legs have been swollen for about 2 weeks  He did have a fall on ice about 2 weeks ago and had swelling at the left knee, but no other trauma  Patient states he does have pain in bilateral legs from the knees down for the past 2 weeks  Patient came in today because he noted that it was much larger today and had difficulty putting on pants and shoes that he didn't have yesterday  Patient also complains of SOB since 2 weeks, gradually getting worse, about the same today compared to yesterday  Patient endorses anterior chest pain over the sternum, feels like a pressure sensation, worse yesterday and today  Patient states he does have exertional dyspnea and exertion makes the chest pain worse  Patient does have back pain, which is chronic and unchanged  Patient states he was sick recently, last weekend had a GI bug, no recent fevers or chills  No recent hospitaliation, no recent travel, no history of cardiac disease, no history of DVT/PE  PMH: hepatitis from blood transfusion, lyme disease  PSH: wrist  SH: smokes about 1/2 ppd, denies drug use, prior EtOH abuse, does not drink everyday now  FH: no cardiac or DVT/PE    None       Past Medical History:   Diagnosis Date    Back pain, chronic     Cirrhosis ([facility] [address] )     Hepatitis        History reviewed  No pertinent surgical history  History reviewed  No pertinent family history  I have reviewed and agree with the history as documented      Social History   Substance Use Topics    Smoking status: Current Every Day Smoker     Packs/day: 0 50    Smokeless tobacco: Not on file    Alcohol use Yes      Comment: socially        Review of Systems    Constitutional: Negative for appetite change, chills and fever  HENT: Negative for congestion, rhinorrhea and sore throat  Eyes: Negative for photophobia, pain and visual disturbance  Respiratory: Negative for cough, Positive for chest tightness and shortness of breath  Cardiovascular: Positive for chest pain and leg swelling  Neg for palpitations   Gastrointestinal: Negative for abdominal pain, diarrhea, nausea and vomiting  Genitourinary: Negative for dysuria, flank pain and hematuria  Musculoskeletal: Negative for back pain, neck pain and neck stiffness  Skin: Negative for color change, rash and wound  Neurological: Negative for dizziness, numbness and headaches  All other systems reviewed and are negative        Physical Exam  ED Triage Vitals [03/08/18 0106]   Temperature Pulse Respirations Blood Pressure SpO2   (!) 96 3 °F (35 7 °C) (!) 106 (!) 24 158/71 100 %      Temp Source Heart Rate Source Patient Position - Orthostatic VS BP Location FiO2 (%)   Tympanic Monitor Sitting Left arm --      Pain Score       5           Orthostatic Vital Signs  Vitals:    03/08/18 0106 03/08/18 0215 03/08/18 0329   BP: 158/71 145/74 149/76   Pulse: (!) 106 96 93   Patient Position - Orthostatic VS: Sitting Sitting        Physical Exam  /76   Pulse 93   Temp 98 °F (36 7 °C)   Resp 20   Wt 72 6 kg (160 lb)   SpO2 98%   BMI 21 70 kg/m²     General Appearance:  Alert, cooperative, no distress   Head:  Normocephalic, without obvious abnormality, atraumatic   Eyes:  PERRL, conjunctiva/corneas clear, EOM's intact       Nose: Nares normal, septum midline, mucosa normal, no drainage or sinus tenderness   Throat: Lips, mucosa, and tongue normal; teeth and gums normal   Neck: Supple, symmetrical, trachea midline, no adenopathy   Back:   Symmetric, no curvature, ROM normal, no CVA tenderness   Lungs:   Clear to auscultation bilaterally, respirations unlabored   Chest Wall:  No tenderness or deformity   Heart:  Regular rate and rhythm, S1, S2 normal, no murmur, rub or gallop   Abdomen:   Soft, non-tender, bowel sounds active all four quadrants           Extremities: Extremities normal, atraumatic, no cyanosis    Patient with BLE +1 edema, mild tenderness in calves bilaterally, no swelling or effusions in knees bilaterally   Pulses: 2+ and symmetric   Skin: Skin color, texture, turgor normal, no rashes or lesions       Neurologic:      Psychiatric:  Moves all extremities, sensation and strength in tact in all extremities    Normal mood and affect       ED Medications  Medications   acetaminophen (TYLENOL) tablet 650 mg (650 mg Oral Given 3/8/18 0335)   aspirin chewable tablet 81 mg (not administered)   atorvastatin (LIPITOR) tablet 40 mg (not administered)   nitroglycerin (NITROSTAT) SL tablet 0 4 mg (not administered)   pantoprazole (PROTONIX) injection 40 mg (not administered)       Diagnostic Studies  Results Reviewed     Procedure Component Value Units Date/Time    Lipid Panel with Direct LDL reflex [[id number]]  (Abnormal) Collected:  03/08/18 0126    Lab Status:  Final result Specimen:  Blood from Arm, Right Updated:  03/08/18 0318     Cholesterol 95 mg/dL      Triglycerides 89 mg/dL      HDL, Direct 31 (L) mg/dL      LDL Calculated 46 mg/dL     Narrative:         Triglyceride:        Normal               <150 mg/dl        Borderline High     150-199 mg/dl        High               200-499 mg/dl        Very High           >499 mg/dl      Magnesium [[id number]]  (Normal) Collected:  03/08/18 0126    Lab Status:  Final result Specimen:  Blood from Arm, Right Updated:  03/08/18 0318     Magnesium 1 8 mg/dL     Troponin I [[id number]]     Lab Status:  No result Specimen:  Blood     POCT Chem 8+ [[id number]]  (Abnormal) Collected:  03/08/18 0204    Lab Status:  Final result Updated:  03/08/18 0209     SODIUM, I-STAT 138 mmol/l      Potassium, i-STAT 4 3 mmol/L      Chloride, istat 104 mmol/L      CO2, i-STAT 25 mmol/L      Anion Gap, Istat 13 mmol/L Calcium, Ionized i-STAT 1 14 mmol/L      BUN, I-STAT 28 (H) mg/dl      Creatinine, i-STAT 0 9 mg/dl      eGFR 95 ml/min/1 73sq m      Glucose, i-STAT 110 mg/dl      Hct, i-STAT 15 (L) %      Hgb, i-STAT 5 1 (LL) g/dl      Specimen Type VENOUS    Comprehensive metabolic panel [[id number]]  (Abnormal) Collected:  03/08/18 0126    Lab Status:  Final result Specimen:  Blood from Arm, Right Updated:  03/08/18 0154     Sodium 137 mmol/L      Potassium 3 8 mmol/L      Chloride 106 mmol/L      CO2 24 mmol/L      Anion Gap 7 mmol/L      BUN 23 mg/dL      Creatinine 0 94 mg/dL      Glucose 106 mg/dL      Calcium 8 3 mg/dL       (H) U/L      ALT 80 (H) U/L      Alkaline Phosphatase 137 (H) U/L      Total Protein 6 9 g/dL      Albumin 2 7 (L) g/dL      Total Bilirubin 0 35 mg/dL      eGFR 90 ml/min/1 73sq m     Narrative:         National Kidney Disease Education Program recommendations are as follows:  GFR calculation is accurate only with a steady state creatinine  Chronic Kidney disease less than 60 ml/min/1 73 sq  meters  Kidney failure less than 15 ml/min/1 73 sq  meters  B-type natriuretic peptide [[id number]]  (Abnormal) Collected:  03/08/18 0126    Lab Status:  Final result Specimen:  Blood from Arm, Right Updated:  03/08/18 0154     NT-proBNP 348 (H) pg/mL     Troponin I [[id number]]  (Abnormal) Collected:  03/08/18 0126    Lab Status:  Final result Specimen:  Blood from Arm, Right Updated:  03/08/18 0154     Troponin I 0 05 (H) ng/mL     Narrative:         [employer] Chemistry analyzer 99% cutoff is > 0 04 ng/mL in network labs    o cTnI 99% cutoff is useful only when applied to patients in the clinical setting of myocardial ischemia  o cTnI 99% cutoff should be interpreted in the context of clinical history, ECG findings and possibly cardiac imaging to establish correct diagnosis  o cTnI 99% cutoff may be suggestive but clearly not indicative of a coronary event without the clinical setting of myocardial ischemia  CBC and differential [[id number]]  (Abnormal) Collected:  03/08/18 0126    Lab Status:  Final result Specimen:  Blood from Arm, Right Updated:  03/08/18 0152     WBC 6 36 Thousand/uL      RBC 2 02 (L) Million/uL      Hemoglobin 5 5 (LL) g/dL      Hematocrit 17 0 (L) %      MCV 84 fL      MCH 27 2 pg      MCHC 32 4 g/dL      RDW 16 8 (H) %      MPV 9 3 fL      Platelets 873 Thousands/uL      nRBC 0 /100 WBCs      Neutrophils Relative 44 %      Lymphocytes Relative 39 %      Monocytes Relative 14 (H) %      Eosinophils Relative 2 %      Basophils Relative 1 %      Neutrophils Absolute 2 82 Thousands/µL      Lymphocytes Absolute 2 48 Thousands/µL      Monocytes Absolute 0 91 Thousand/µL      Eosinophils Absolute 0 10 Thousand/µL      Basophils Absolute 0 03 Thousands/µL     Protime-INR [[id number]]  (Abnormal) Collected:  03/08/18 0126    Lab Status:  Final result Specimen:  Blood from Arm, Right Updated:  03/08/18 0152     Protime 15 2 (H) seconds      INR 1 19 (H)    APTT [[id number]]  (Normal) Collected:  03/08/18 0126    Lab Status:  Final result Specimen:  Blood from Arm, Right Updated:  03/08/18 0152     PTT 28 seconds     Narrative: Therapeutic Heparin Range = 60-90 seconds                 X-ray chest 2 views   Final Result by Valery Sue MD (03/08 0257)      No acute cardiopulmonary disease              Workstation performed: [id number]         US abdominal aorta screening aaa    (Results Pending)         Procedures  ECG 12 Lead Documentation  Date/Time: 3/8/2018 1:40 AM  Performed by: Tanner Barney by: [place]     Indications / Diagnosis:  Chest pain, SOB, leg swelling  ECG reviewed by me, the ED Provider: yes    Patient location:  ED  Previous ECG:     Previous ECG:  Compared to current    Comparison ECG info:  ST depressions  Interpretation:     Interpretation: abnormal    Rate:     ECG rate:  95    ECG rate assessment: normal    Rhythm:     Rhythm: sinus rhythm    Ectopy:     Ectopy: none    QRS:     QRS axis:  Normal    QRS intervals:  Normal  Conduction:     Conduction: normal    ST segments:     ST segments:  Abnormal    Depression:  II, III and aVF  T waves:     T waves: normal            Phone Consults  ED Phone Contact    ED Course  ED Course as of Mar 08 0358   Thu Mar 08, 2018   0204 Patient's hemoglobin is 5 5  Patient did state that she has had dark brown stool for the past 2 weeks  No black stools  Hemoccult was positive  Patient states that 1 episode of hematemesis last weekend, says that he had a small blood clot seen in his vomit  No other hematemesis  No prior history of GI bleeding  0090 Blood consent form was signed by patient, Dr Aicha Rios was present  [phone] with SOB resident, he says they will come down to take a look the patient  They will told me which team he will go to the afterwards  MDM   BLE swelling, possibly from CHF vs renal issues vs hepatic etiology  Will check cardiac work up, LFTs, INR, CXR  No chest pain right now  Likely admission given EKG changes      CritCare Time    Disposition  Final diagnoses:   Acute blood loss anemia   GI bleed   Hepatic cirrhosis (HCC)   Angina pectoris ([facility] [address] )     Time reflects when diagnosis was documented in both MDM as applicable and the Disposition within this note     Time User Action Codes Description Comment    3/8/2018  2:23 AM Arlana Ripple D Add [D62] Acute blood loss anemia     3/8/2018  2:23 AM Arlana Ripple D Modify [D62] Acute blood loss anemia     3/8/2018  2:23 AM Arlana Ripple D Modify [D62] Acute blood loss anemia     3/8/2018  2:23 AM Arlana Ripple D Add [K92 2] GI bleed     3/8/2018  2:23 AM Arlana Ripple D Modify [K92 2] GI bleed     3/8/2018  2:23 AM Arlana Ripple D Add [K74 60] Hepatic cirrhosis ([facility] [address] )     3/8/2018  2:23 AM Arlana Ripple D Modify [K74 60] Hepatic cirrhosis ([facility] [address] )     3/8/2018  2:55 AM Arlana Ripple D Add [I20 9] Angina pectoris ([facility] [address] )     3/8/2018 2:55 AM Amie MORAN Modify [I20 9] Angina pectoris ([facility] [address] )     3/8/2018  2:55 AM Sonny Garcia Remove [I20 9] Angina pectoris ([facility] )     3/8/2018  2:55 AM Sonny Garcia Add [I20 9] Angina pectoris ([facility] )     3/8/2018  2:55 AM Sonny Garcia Modify [I20 9] Angina pectoris [facility])       ED Disposition     ED Disposition Condition Comment    Admit  Case was discussed with SOD A and the patient's admission status was agreed to be Admission Status: inpatient status to the service of Dr Criss Greenwood  Follow-up Information    None       Patient's Medications    No medications on file     No discharge procedures on file  ED Provider  Attending physically available and evaluated Tiffanie Roser  I managed the patient along with the ED Attending      Electronically Signed by         Jared Avalos MD  03/08/18 0004

## 2018-03-08 NOTE — PROGRESS NOTES
[facility] Senior Admission Note   Unit/Bed # SPR 2 Encounter: [id number]  SOD Team A          Cande Faulkner 65 y o  male [id number]  Patient with past medical history of hepatitis-C infection secondary to blood transfusion, liver cirrhosis, and nicotine dependence who is presenting with primary complaint of fatigue, shortness of breath and bilateral lower extremity swelling for roughly 2 weeks, and chest pressure/discomfort on exertion for the last week  Chest pain is described as a pressure which is brought on by physical activity such as climbing stairs, and usually goes away after 5 minutes with rest   Also reports feelings of leg heaviness for about the last 2 weeks  Has smoked for about the past 15 years  Also reports chronic leg cramps  He reports history of stomach ulcerations as well  Denies history of heart attack or stroke, colon cancer, and alcoholism  He drinks roughly 6-12 regular size beers a week  Smokes about half pack a day  Denies regular illegal drug use, but does report that he took some meth once within the past 2 weeks in an attempt to boost his energy  In the ED, was found to have a troponin of 0 05 and transaminitis along with a hemoglobin of 5 5  Stool was guaiac positive  Anemia was believed to be secondary to GI loss, so if he was started on 2 L for transfusion  Patient seen and examined  Reviewed H&P per Dr Cheryl Kahn  Agree with the assessment and plan       Assessment/Plan:   Principal Problem:    Blood loss anemia  Active Problems:    Angina pectoris (HCC)    Leg swelling    Hepatic cirrhosis (HCC)    GI bleed    Chronic hepatitis C virus infection (HCC)    Nicotine dependence    SIRS (systemic inflammatory response syndrome) (HCC)     Blood loss anemia  · Suspect secondary to upper GI bleed, possibly lower GI bleed   · INR 1 19 in the ED  · Hold DVT prophylaxis  · Monitor CBC, hemoglobin  · Follow-up GI consult for possible EGD and/or colonoscopy  · IV Protonix  · NPO for now    NSTEMI with Angina pectoris  · Likely secondary to demand ischemia from blood loss anemia  · EKG with ST depressions in leads 2, 3, and AVF  · Sublingual nitro p r n    · Follow-up echocardiogram  · Monitor troponins and repeat EKG  · Cardiology consult for possible catheterization  · Start on daily aspirin and statin    Hepatitis-C with history of hepatic cirrhosis  · Currently NPO  · Follow-up GI consult  · Will likely require diuresis however will hold off for now due to his blood loss anemia  · Started on diuretics when medically appropriate    SIRS  · Suspect secondary to NSTEMI and hepatic decompensation  · Do not suspect infection at this point  · Patient afebrile and without a white blood cell count  · Monitor hemodynamically    Nicotine dependence  · Patient declines nicotine patch for now  ·  and educate on cessation  · Will obtain abdominal aortic ultrasound for aneurysm screening    Disposition:  INPATIENT     Expected LOS: >2 Midnights      Cari Garcia DO

## 2018-03-08 NOTE — CASE MANAGEMENT
Initial Clinical Review    Thank you,  [address] [facility] in the [facility] by [facility] for 2017  Network Utilization Review Department  Phone: [phone]; Fax [phone]  ATTENTION: The Network Utilization Review Department is now centralized for our 7 Facilities  Make a note that we have a new phone and fax numbers for our Department  Please call with any questions or concerns to [phone] and carefully follow the prompts so that you are directed to the right person  All voicemails are confidential  Fax any determinations, approvals, denials, and requests for initial or continue stay review clinical to [phone]  Due to HIGH CALL volume, it would be easier if you could please send faxed requests to expedite your requests and in part, help us provide discharge notifications faster  Admission: Date/Time/Statement: 3/8/18 @ 0300     Orders Placed This Encounter   Procedures    Inpatient Admission     Standing Status:   Standing     Number of Occurrences:   1     Order Specific Question:   Admitting Physician     Answer:   Merlyn Quinones     Order Specific Question:   Level of Care     Answer:   Level 2 Stepdown / HOT [14]     Order Specific Question:   Estimated length of stay     Answer:   More than 2 Midnights     Order Specific Question:   Certification     Answer:   I certify that inpatient services are medically necessary for this patient for a duration of greater than two midnights  See H&P and MD Progress Notes for additional information about the patient's course of treatment           ED: Date/Time/Mode of Arrival:   ED Arrival Information     Expected Arrival Acuity Means of Arrival Escorted By Service Admission Type    - 3/8/2018 01:01 Emergent Walk-In Family Member Emergency Medicine Emergency    Arrival Complaint    Leg Pain          Chief Complaint:   Chief Complaint   Patient presents with    Leg Swelling     bilateral leg swelling and SOB for 2 weeks,  SOB with exertion, chest pain, diaphoresis, no nausea, dizziness       History of Illness: Muriel Talavera is a 65 y o  male  presented emergency department for evaluation of bilateral lower extremity edema/shortness of breath with exertion/chest pain for the last approximately 2 weeks  Patient states that while he has been working as a  he has been having increasing shortness of breath  Patient also states that he has a pressure-like sensation on his chest that gets worse with exertion and resolves with rest   Interestingly, the patient had been experiencing increasing fatigue while working and some of his colleagues and recommended utilizing a small dose of methamphetamine as a potential means to increase his energy level  Patient stated that he took this will illicit substance and had no benefit from this  (The patient was immediately counseled on the importance of not repeating this ) Additionally the patient also has been complaining of bilateral lower extremity edema for the last 2 weeks  Patient states the edema is exacerbated by working  Additionally the patient states that he has pain in his lower extremities that is constant but exacerbated when he is trying to lie still and when he is walking  Patient did note 1 episode of dark stools last week but does not remember having any brandee blood in his stools, brandee blood in his urine, patient denies any headaches, changes to his vision, patient has not noticed any easy bruising or bleeding  Patient does affirm some chest pain which he describes as a pressure-like sensation that occurs with exertion that resolves with rest and hyperventilation    Patient denies any abdominal pain patient denies any issues with urination/defecation/constipation        ED Vital Signs:   ED Triage Vitals [03/08/18 0106]   Temperature Pulse Respirations Blood Pressure SpO2   (!) 96 3 °F (35 7 °C) (!) 106 (!) 24 158/71 100 %      Temp Source Heart Rate Source Patient Position - Orthostatic VS BP Location FiO2 (%)   Tympanic Monitor Sitting Left arm --      Pain Score       5        Wt Readings from Last 1 Encounters:   03/08/18 72 6 kg (160 lb)     Temp - range 97 8-98 1    Abnormal Labs/Diagnostic Test Results:   Bun/cr 28/0 9 - Trop 0 05- 0 04 -  - H/H 5 5/17 2 - 6 1/19 0 - 6 8/20 2    ECHO   CXR - no acute        ED Treatment:   Medication Administration from 03/08/2018 0100 to 03/08/2018 0820       Date/Time Order Dose Route Action     03/08/2018 0335 acetaminophen (TYLENOL) tablet 650 mg 650 mg Oral Given       Past Medical/Surgical History:   Diagnosis    Back pain, chronic    Cirrhosis (HCC)    Hepatitis       Admitting Diagnosis: Leg swelling [M79 89]    Age/Sex: 65 y o  male    Assessment/Plan:    1  Anemia, hemoglobin of 5 5  Likely multifactorial makes in between GI blood loss verses occult GI malignancy verses bleeding diathesis secondary to liver disease  Hemoccult positive for blood  Patient has remote history of gastric ulcers  Patient additionally states that he had an unexpected weight loss of approximately 20-30 lb while he was taking care of his 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  Consult to GI for further evaluation, potential EGD/colonoscopy  Protonix  NPO  Continue to trend hemoglobin after 2 units of packed red blood cells  Continue to monitor vital signs for hemodynamic stability  Type and screen performed     2  Hepatic cirrhosis, acutely decompensated on chronic  Patient has scan to +1 pitting edema bilateral lower extremities to the level of the knee  Patient has a past medical history significant for hepatitis-C     3    Chest pain, anginal, typical  Patient describes a pressure-like sensation in his chest that is associated with exertion and resolves with rest  Patient stated he has never had symptoms like this in the past  Likely secondary to demand ischemia from anemia with decreased oxygen carrying capacity  Additionally patient has consistent claudication in the lower extremities  Cardiology consult for an echocardiogram to evaluate for systolic function and wall motion abnormalities  EKG demonstrated no signs of ischemia  Troponins were minimally elevated at 0 05     4  Tobacco abuse disorder  Patient has a smoking history of approximately eight pack years stating that he started smoking at the age approximately 37 and smokes 1/2 pack per day      5   GI bleed, acute on chronic  Hemoccult positive  GI consult as above  Largely hemodynamically stable with vital signs of heart rate approximately 100 and SBP in the 140s to 150s     6   Bilateral lower extremity edema  Likely associated with decompensated hepatic failure  Patient would likely benefit from diuresis, will re-evaluate Lasix once patient more established and stable  Monitor strict Is&Os  Repeat BMP in a m  Replete electrolytes as needed    Admission Orders:  Scheduled Meds:   Current Facility-Administered Medications:  [START ON 3/9/2018] aspirin 81 mg Oral Daily   atorvastatin 40 mg Oral Daily With Dinner   ciprofloxacin 400 mg Intravenous Q12H   pantoprazole 40 mg Intravenous Q12H [facility]   PRBC  2 Units  Intravenous   on 3/8   PRBC  1 Unit  Intravenous  on 3/8     Continuous Infusions:    PRN Meds:   acetaminophen    nitroglycerin    Nursing orders  - Telem - I & O q shift - SCD's to le's- Up with assistance - VS q 3 - Diet NPO     Gastroenterology Consult  -   GI bleed - he presented to ER b/c of SOB & was found to be anemic & heme +  He reports sxs over the past week, Hbg 1 yr ago WNL's  BUN WNL's  Likely bled previously & has since stopped  Hbg on admission 5 5 -> 2 units -> 6 8  He is to receive 1 more  I would recommend EGD for further evaluation  Unlikely this is from EV, probably gastritis, PUD  -Continue Protonix drip  -Continue Abx  -NPO  -EGD later today     2  Hep C & alcoholic cirrhosis- he has had no f/u & continues to drink  MELD 11   No ascites or HE on exam  Labs consistent with cirrhosis  Hep C viral load pending  US pending   -Alcohol abstinence - watch for withdraw  -Outpt f/u for possible Hep C workup  -Follow-up US     3  Screening colon - he has never had a screening colon & has a FH of colon CA   Likely this can be done as outpt but if EGD is (-) can consider into workup        Cardiology Consult - pending

## 2018-03-08 NOTE — ANESTHESIA PREPROCEDURE EVALUATION
Review of Systems/Medical History  Patient summary reviewed  Chart reviewed  No history of anesthetic complications     Cardiovascular  EKG reviewed, Angina ,   Comment: Normal sinus rhythm  Normal ECG  When compared with ECG of 08-MAR-2018 01:21, (unconfirmed)  No significant change was found  Confirmed by Amarilis Rudd MD, Kathleen Mtz ([id number]) on 3/8/2018 8:39:16 AM    Specimen Collected: 03/08/18 07:38      ,  Pulmonary  Smoker cigarette smoker  , Tobacco cessation counseling given ,        GI/Hepatic    Liver disease, alcohol related and cirrhosis, Hepatitis C,             Endo/Other     GYN       Hematology  Anemia ,     Musculoskeletal       Neurology   Psychology           Physical Exam    Airway    Mallampati score: II  TM Distance: >3 FB  Neck ROM: full     Dental   No notable dental hx     Cardiovascular  Cardiovascular exam normal    Pulmonary  Pulmonary exam normal Breath sounds clear to auscultation,     Other Findings        Anesthesia Plan  ASA Score- 2     Anesthesia Type- IV sedation with anesthesia with ASA Monitors  Additional Monitors:   Airway Plan:         Plan Factors- Patient instructed to abstain from smoking on day of procedure  Patient did not smoke on day of surgery  Induction- intravenous  Postoperative Plan-     Informed Consent- Anesthetic plan and risks discussed with patient  I personally reviewed this patient with the CRNA  Discussed and agreed on the Anesthesia Plan with the CRNA             Lab Results   Component Value Date    WBC 7 88 03/08/2018    HGB 6 8 (LL) 03/08/2018    HCT 20 2 (L) 03/08/2018    MCV 85 03/08/2018     (L) 03/08/2018     Lab Results   Component Value Date    GLUCOSE 115 03/08/2018    CALCIUM 8 0 (L) 03/08/2018     03/08/2018    K 3 6 03/08/2018    CO2 22 03/08/2018     03/08/2018    BUN 20 03/08/2018    CREATININE 0 84 03/08/2018     Lab Results   Component Value Date    INR 1 19 (H) 03/08/2018    PROTIME 15 2 (H) 03/08/2018     Lab Results Component Value Date    PTT 28 03/08/2018     Type and Screen:  A        I, Dr Natalia Munguia, the attending physician, have personally seen and evaluated the patient prior to anesthetic care  I have reviewed the pre-anesthetic record, and other medical records if appropriate to the anesthetic care  If a CRNA is involved in the case, I have reviewed the CRNA assessment, if present, and agree  The patient is in a suitable condition to proceed with my formulated anesthetic plan

## 2018-03-08 NOTE — ED NOTES
Patient belonings in ER Zone 5 med room in locker A  Two bags and patient chart on shelf       Brigette Crane  03/08/18 [id number]

## 2018-03-09 LAB
ABO GROUP BLD BPU: NORMAL
ALBUMIN SERPL BCP-MCNC: 2.3 G/DL (ref 3.5–5)
ALP SERPL-CCNC: 109 U/L (ref 46–116)
ALT SERPL W P-5'-P-CCNC: 72 U/L (ref 12–78)
ANION GAP SERPL CALCULATED.3IONS-SCNC: 7 MMOL/L (ref 4–13)
AST SERPL W P-5'-P-CCNC: 106 U/L (ref 5–45)
BILIRUB SERPL-MCNC: 0.63 MG/DL (ref 0.2–1)
BPU ID: NORMAL
BUN SERPL-MCNC: 12 MG/DL (ref 5–25)
CALCIUM SERPL-MCNC: 7.3 MG/DL (ref 8.3–10.1)
CHLORIDE SERPL-SCNC: 111 MMOL/L (ref 100–108)
CO2 SERPL-SCNC: 24 MMOL/L (ref 21–32)
CREAT SERPL-MCNC: 0.81 MG/DL (ref 0.6–1.3)
CROSSMATCH: NORMAL
GFR SERPL CREATININE-BSD FRML MDRD: 99 ML/MIN/1.73SQ M
GLUCOSE SERPL-MCNC: 89 MG/DL (ref 65–140)
HBV CORE AB SER QL: ABNORMAL
HBV CORE IGM SER QL: ABNORMAL
HBV SURFACE AG SER QL: ABNORMAL
HCT VFR BLD AUTO: 21.4 % (ref 36.5–49.3)
HCT VFR BLD AUTO: 21.5 % (ref 36.5–49.3)
HCT VFR BLD AUTO: 23.1 % (ref 36.5–49.3)
HCV AB SER QL: ABNORMAL
HGB BLD-MCNC: 7 G/DL (ref 12–17)
HGB BLD-MCNC: 7 G/DL (ref 12–17)
HGB BLD-MCNC: 7.5 G/DL (ref 12–17)
POTASSIUM SERPL-SCNC: 4 MMOL/L (ref 3.5–5.3)
PROT SERPL-MCNC: 6.2 G/DL (ref 6.4–8.2)
SODIUM SERPL-SCNC: 142 MMOL/L (ref 136–145)
UNIT DISPENSE STATUS: NORMAL
UNIT PRODUCT CODE: NORMAL
UNIT RH: NORMAL

## 2018-03-09 PROCEDURE — 87522 HEPATITIS C REVRS TRNSCRPJ: CPT | Performed by: INTERNAL MEDICINE

## 2018-03-09 PROCEDURE — 93306 TTE W/DOPPLER COMPLETE: CPT | Performed by: INTERNAL MEDICINE

## 2018-03-09 PROCEDURE — 86704 HEP B CORE ANTIBODY TOTAL: CPT | Performed by: INTERNAL MEDICINE

## 2018-03-09 PROCEDURE — 99232 SBSQ HOSP IP/OBS MODERATE 35: CPT | Performed by: INTERNAL MEDICINE

## 2018-03-09 PROCEDURE — 87340 HEPATITIS B SURFACE AG IA: CPT | Performed by: INTERNAL MEDICINE

## 2018-03-09 PROCEDURE — 80053 COMPREHEN METABOLIC PANEL: CPT | Performed by: INTERNAL MEDICINE

## 2018-03-09 PROCEDURE — 85014 HEMATOCRIT: CPT | Performed by: INTERNAL MEDICINE

## 2018-03-09 PROCEDURE — 86803 HEPATITIS C AB TEST: CPT | Performed by: INTERNAL MEDICINE

## 2018-03-09 PROCEDURE — 85018 HEMOGLOBIN: CPT | Performed by: INTERNAL MEDICINE

## 2018-03-09 PROCEDURE — 86705 HEP B CORE ANTIBODY IGM: CPT | Performed by: INTERNAL MEDICINE

## 2018-03-09 PROCEDURE — C9113 INJ PANTOPRAZOLE SODIUM, VIA: HCPCS | Performed by: INTERNAL MEDICINE

## 2018-03-09 RX ORDER — PANTOPRAZOLE SODIUM 40 MG/1
40 TABLET, DELAYED RELEASE ORAL
Status: DISCONTINUED | OUTPATIENT
Start: 2018-03-09 | End: 2018-03-10 | Stop reason: HOSPADM

## 2018-03-09 RX ADMIN — PANTOPRAZOLE SODIUM 40 MG: 40 TABLET, DELAYED RELEASE ORAL at 16:20

## 2018-03-09 RX ADMIN — NADOLOL 10 MG: 20 TABLET ORAL at 12:03

## 2018-03-09 RX ADMIN — SODIUM CHLORIDE 8 MG/HR: 9 INJECTION, SOLUTION INTRAVENOUS at 12:04

## 2018-03-09 RX ADMIN — NICOTINE 14 MG: 14 PATCH, EXTENDED RELEASE TRANSDERMAL at 12:02

## 2018-03-09 RX ADMIN — CEFTRIAXONE 1000 MG: 1 INJECTION, SOLUTION INTRAVENOUS at 16:21

## 2018-03-09 RX ADMIN — SODIUM CHLORIDE 8 MG/HR: 9 INJECTION, SOLUTION INTRAVENOUS at 01:25

## 2018-03-09 NOTE — CASE MANAGEMENT
Continued Stay Review    Date: 3-9-18      Vital Signs: /69 (BP Location: Right arm)   Pulse 80   Temp 97 6 °F (36 4 °C) (Oral)   Resp 18   Ht 6' (1 829 m)   Wt 76 3 kg (168 lb 3 4 oz)   SpO2 97%   BMI 22 81 kg/m²     Medications:   Scheduled Meds:   Current Facility-Administered Medications:  acetaminophen 650 mg Oral Q8H PRN   atorvastatin 40 mg Oral Daily With Dinner   cefTRIAXone 1,000 mg Intravenous Q24H   nadolol 10 mg Oral Daily   nicotine 14 mg Transdermal Daily   nitroglycerin 0 4 mg Sublingual Q5 Min PRN   pantoprozole (PROTONIX) infusion (Continuous) 8 mg/hr Intravenous Continuous     Continuous Infusions:   pantoprozole (PROTONIX) infusion (Continuous) 8 mg/hr     PRN Meds:   acetaminophen    nitroglycerin    Abnormal Labs/Diagnostic Results:     3-7-18  0126 TO  3-9 0932   Hemoglobin   5 5   6 1    6 8 7 1  7 0   7 0     HEMATOCRIT   17 0   19 0    20 2 21 6  21 4   21 5     3-8  AMPH/METH  Positive       EGD 3-8-18     IMPRESSIONS: Esophagitis seen  Trena-Khalil tear found in the distal   third of the esophagus  Epinephrine injection therapy was applied to   control bleeding  Clips were applied to control bleeding  Small varices   found in the distal third of the esophagus and cardia  Gastritis found in   the entire stomach  Normal, symmetrical, and patent pylorus  Normal   duodenal bulb and 2nd portion of the duodenum  RECOMMENDATIONS:  Start ceftriaxone 1g daily for 5 days  Continue PPi gtt   for 48 hours  Start clears  EGD in 1years unless there is decompensation   from cirrhosis  Start 10 mg of Nadolol for varices  Age/Sex: 65 y o  male     Assessment/Plan:   Patient Hemoglobin came back at 7 0  Last hemoglobin was 7 1  Will transfuse under 7 0       Discharge Plan: TO BE DETERMINED

## 2018-03-09 NOTE — PROGRESS NOTES
Progress Note - Sherryle Lurie 65 y o  male MRN: [id number]    Unit/Bed#: [facility] 723-01 Encounter: [id number]         Assessment/ Plan:  GI bleed  Hep C & alcoholic cirrhosis  Screening colon     1  GI bleed - he presented to ER b/c of SOB & was found to be anemic & heme +  He reports sxs over the past week, Hbg 1 yr ago WNL's  BUN WNL's  Likely bled previously & has since stopped  Hbg now stable  S/p EGD yesterday showing - esophagitis, MWT & EV, no active bleeding     -Stop Protonix drip & change to PO BID  -Advance diet as tolerated     2  Hep C & alcoholic cirrhosis- he has had no f/u & continues to drink  MELD 11  No ascites or HE on exam  Labs consistent with cirrhosis  Hep C viral load pending    -Alcohol abstinence - watch for withdraw  -Outpt f/u for possible Hep C workup if he stops drinking     3  Screening colon - he has never had a screening colon & has a FH of colon CA  Likely this can be done as outpt but if EGD is (-) can consider into workup          Subjective:   Pt denies abd pain  No rectal bleeding  Tolerating clears  Objective:     Vitals: Blood pressure 136/69, pulse 80, temperature 97 6 °F (36 4 °C), temperature source Oral, resp  rate 18, height 6' (1 829 m), weight 76 3 kg (168 lb 3 4 oz), SpO2 97 %  ,Body mass index is 22 81 kg/m²  Physical Exam: General appearance: alert and oriented, in no acute distress  Lungs: clear to auscultation bilaterally  Heart: regular rate and rhythm  Abdomen: soft, non-tender; bowel sounds normal; no masses,  no organomegaly  Skin: Skin color, texture, turgor normal  No rashes or lesions     Invasive Devices     Peripheral Intravenous Line            Peripheral IV 03/08/18 Left Forearm 1 day    Peripheral IV 03/08/18 Right Antecubital 1 day                Lab, Imaging and other studies: I have personally reviewed pertinent reports       CBC:   Lab Results   Component Value Date    HGB 7 0 (L) 03/09/2018    HCT 21 5 (L) 03/09/2018   ,   CMP:   Lab Results Component Value Date     03/09/2018    K 4 0 03/09/2018     (H) 03/09/2018    CO2 24 03/09/2018    ANIONGAP 7 03/09/2018    BUN 12 03/09/2018    CREATININE 0 81 03/09/2018    GLUCOSE 89 03/09/2018    CALCIUM 7 3 (L) 03/09/2018     (H) 03/09/2018    ALT 72 03/09/2018    ALKPHOS 109 03/09/2018    PROT 6 2 (L) 03/09/2018    BILITOT 0 63 03/09/2018    EGFR 99 03/09/2018   ,

## 2018-03-09 NOTE — PROGRESS NOTES
Patient Hemoglobin came back at 7 0  Last hemoglobin was 7 1  SOD made aware  Will transfuse under 7 0  Will continue to monitor

## 2018-03-09 NOTE — PROGRESS NOTES
IM Residency Progress Note   [facility] Encounter: [id number]  SOD Team A      Erich Cowden 65 y o  male [id number]    Hospital Stay Days: 1      Assessment/Plan:    Liver cirrhosis 2/2 hepatitis C and alcohol abuse - MELD score 11 -   - chronic hepatitis panel - pending  - protonix gtt, ceftriaxone 1000mg every 24 hours - 2/5   - currently on clears, advance diet per GI  - will need follow-up for [address]  screening and varices screening    Upper GI bleed 2/2 - Esophagitis/gastritis/Trena Khalil tear in distal third of esophagus - per EGD    Acute blood loss anemia 2/2 upper GI bleed - Hb 7 0  - will hold off transfusion for now, currently patient asymptomatic  - monitor H and H every 8 hours    Small varices in distal 1/3 of esophagus - initiated nadolol 10mg daily    Bilateral lower extremity swelling - 2/2 low albumin  - albumin - 2 3    Tobacco abuse - nicotine patch     Screening colonoscopy - patient above 48years of age  - to be done as an outpatient        Principal Problem:    Blood loss anemia  Active Problems:    Leg swelling    Hepatic cirrhosis (HCC)    GI bleed    Angina pectoris (HCC)    Chronic hepatitis C virus infection (HCC)    Nicotine dependence    SIRS (systemic inflammatory response syndrome) (HCC)    Disposition: per SOD, gastroenterology     Subjective: Patient has no complaints  Vitals: Temp (24hrs), Av 2 °F (36 8 °C), Min:97 6 °F (36 4 °C), Max:99 5 °F (37 5 °C)  Current: Temperature: 97 6 °F (36 4 °C)  Vitals:    18 1923 18 2300 18 0312 18 0724   BP: 145/74 114/63 128/72 136/69   BP Location: Right arm  Left arm Right arm   Pulse: 89 72 74 80   Resp:    Temp: 98 2 °F (36 8 °C) 98 °F (36 7 °C) 98 2 °F (36 8 °C) 97 6 °F (36 4 °C)   TempSrc: Oral Oral Oral Oral   SpO2: 99% 96%  97%   Weight:       Height:        Body mass index is 22 81 kg/m²  I/O last 24 hours: In: 1556 2 [P O :1340;  I V :166 2; IV Piggyback:50]  Out: 2550 [Urine:2550]      Physical Exam:   Physical Exam   Constitutional: He appears well-developed and well-nourished  HENT:   Head: Normocephalic and atraumatic  Eyes: Pupils are equal, round, and reactive to light  Right eye exhibits no discharge  Left eye exhibits no discharge  No scleral icterus  Neck: Neck supple  Cardiovascular: Normal rate and regular rhythm  No murmur heard  Pulmonary/Chest: Effort normal and breath sounds normal  No respiratory distress  Abdominal: Soft  Bowel sounds are normal  He exhibits no distension  There is no tenderness  Musculoskeletal:   Bilateral lower extremity edema   Neurological: He is alert  Skin: Skin is warm and dry  Psychiatric: He has a normal mood and affect          Invasive Devices     Peripheral Intravenous Line            Peripheral IV 03/08/18 Left Forearm 1 day    Peripheral IV 03/08/18 Right Antecubital 1 day                Labs:   Recent Results (from the past 24 hour(s))   Basic metabolic panel    Collection Time: 03/08/18  4:48 PM   Result Value Ref Range    Sodium 137 136 - 145 mmol/L    Potassium 3 6 3 5 - 5 3 mmol/L    Chloride 108 100 - 108 mmol/L    CO2 25 21 - 32 mmol/L    Anion Gap 4 4 - 13 mmol/L    BUN 14 5 - 25 mg/dL    Creatinine 0 80 0 60 - 1 30 mg/dL    Glucose 103 65 - 140 mg/dL    Calcium 7 5 (L) 8 3 - 10 1 mg/dL    eGFR 100 ml/min/1 73sq m   Hemoglobin and hematocrit, blood    Collection Time: 03/08/18  4:48 PM   Result Value Ref Range    Hemoglobin 7 1 (L) 12 0 - 17 0 g/dL    Hematocrit 21 6 (L) 36 5 - 49 3 %   Rapid drug screen, urine    Collection Time: 03/08/18  6:04 PM   Result Value Ref Range    Amph/Meth UR Positive (A) Negative    Barbiturate Ur Negative Negative    Benzodiazepine Urine Negative Negative    Cocaine Urine Negative Negative    Methadone Urine Negative Negative    Opiate Urine Negative Negative    PCP Ur Negative Negative    THC Urine Negative Negative   Hemoglobin and hematocrit, blood    Collection Time: 03/09/18  1:39 AM   Result Value Ref Range    Hemoglobin 7 0 (L) 12 0 - 17 0 g/dL    Hematocrit 21 4 (L) 36 5 - 49 3 %   Comprehensive metabolic panel    Collection Time: 03/09/18  5:34 AM   Result Value Ref Range    Sodium 142 136 - 145 mmol/L    Potassium 4 0 3 5 - 5 3 mmol/L    Chloride 111 (H) 100 - 108 mmol/L    CO2 24 21 - 32 mmol/L    Anion Gap 7 4 - 13 mmol/L    BUN 12 5 - 25 mg/dL    Creatinine 0 81 0 60 - 1 30 mg/dL    Glucose 89 65 - 140 mg/dL    Calcium 7 3 (L) 8 3 - 10 1 mg/dL     (H) 5 - 45 U/L    ALT 72 12 - 78 U/L    Alkaline Phosphatase 109 46 - 116 U/L    Total Protein 6 2 (L) 6 4 - 8 2 g/dL    Albumin 2 3 (L) 3 5 - 5 0 g/dL    Total Bilirubin 0 63 0 20 - 1 00 mg/dL    eGFR 99 ml/min/1 73sq m   Prepare RBC:Special Requirements: CMV Negative; Has consent been obtained? Yes; Where is the Surgery Scheduled? [facility] ([place], [place]), 1 Units    Collection Time: 03/09/18  5:55 AM   Result Value Ref Range    Unit Product Code L4792T72     Unit Number [id number]     Unit ABO A     Unit RH POS     Crossmatch Compatible     Unit Dispense Status Presumed Trans        Radiology Results: I have personally reviewed pertinent reports  Other Diagnostic Testing:   I have personally reviewed pertinent reports          Active Meds:   Current Facility-Administered Medications   Medication Dose Route Frequency    acetaminophen (TYLENOL) tablet 650 mg  650 mg Oral Q8H PRN    atorvastatin (LIPITOR) tablet 40 mg  40 mg Oral Daily With Dinner    cefTRIAXone (ROCEPHIN) IVPB (premix) 1,000 mg  1,000 mg Intravenous Q24H    nadolol (CORGARD) tablet 10 mg  10 mg Oral Daily    nicotine (NICODERM CQ) 14 mg/24hr TD 24 hr patch 14 mg  14 mg Transdermal Daily    nitroglycerin (NITROSTAT) SL tablet 0 4 mg  0 4 mg Sublingual Q5 Min PRN    pantoprazole (PROTONIX) 80 mg in sodium chloride 0 9 % 100 mL infusion  8 mg/hr Intravenous Continuous         VTE Pharmacologic Prophylaxis: Reason for no pharmacologic prophylaxis GI bleed  VTE Mechanical Prophylaxis: sequential compression device    Caty Knapp MD

## 2018-03-09 NOTE — SOCIAL WORK
Met with pt and explained CM role  Pt lives alone in a 3rd floor apt with [address]  Pt was independent PTA and does not drive  Pt stated that he either walks, rides his bicycle, or his nephew is able to provide him with transportation  Pt reported that he does not have a PCP  [employer] Card provided  No DME avail  No hx of HHC or rehab  No hx of mental health issues  Pt reported a hx of using heroine 30 yrs ago  Pt does not have a prescription plan and uses [facility] or [facility] on [address] in [place] for his prescriptions  Primary contact is pt's sister Magda Lopez, contact # [phone]  Pt's nephew Molly Abdul will provide pt with transportation home upon discharge  Pt reported that he does not have any medical insurance and has recently obtained his 2000 [employer] card  CM made referral for PATHS  CM reviewed d/c planning process including the following: identifying help at home, patient preference for d/c planning needs, Discharge Lounge, [facility] Meds to Bed program, availability of treatment team to discuss questions or concerns patient and/or family may have regarding understanding medications and recognizing signs and symptoms once discharged  CM also encouraged patient to follow up with all recommended appointments after discharge  Patient advised of importance for patient and family to participate in managing patients medical well being

## 2018-03-10 VITALS
HEART RATE: 77 BPM | RESPIRATION RATE: 19 BRPM | HEIGHT: 72 IN | WEIGHT: 168.21 LBS | BODY MASS INDEX: 22.78 KG/M2 | DIASTOLIC BLOOD PRESSURE: 79 MMHG | OXYGEN SATURATION: 98 % | SYSTOLIC BLOOD PRESSURE: 142 MMHG | TEMPERATURE: 98.2 F

## 2018-03-10 PROBLEM — R65.10 SIRS (SYSTEMIC INFLAMMATORY RESPONSE SYNDROME) (HCC): Status: RESOLVED | Noted: 2018-03-08 | Resolved: 2018-03-10

## 2018-03-10 LAB
ANION GAP SERPL CALCULATED.3IONS-SCNC: 6 MMOL/L (ref 4–13)
BUN SERPL-MCNC: 8 MG/DL (ref 5–25)
CALCIUM SERPL-MCNC: 7.5 MG/DL (ref 8.3–10.1)
CHLORIDE SERPL-SCNC: 110 MMOL/L (ref 100–108)
CO2 SERPL-SCNC: 25 MMOL/L (ref 21–32)
CREAT SERPL-MCNC: 0.79 MG/DL (ref 0.6–1.3)
GFR SERPL CREATININE-BSD FRML MDRD: 100 ML/MIN/1.73SQ M
GLUCOSE SERPL-MCNC: 102 MG/DL (ref 65–140)
HCT VFR BLD AUTO: 23 % (ref 36.5–49.3)
HCT VFR BLD AUTO: 23.6 % (ref 36.5–49.3)
HGB BLD-MCNC: 7.4 G/DL (ref 12–17)
HGB BLD-MCNC: 7.6 G/DL (ref 12–17)
POTASSIUM SERPL-SCNC: 4 MMOL/L (ref 3.5–5.3)
SODIUM SERPL-SCNC: 141 MMOL/L (ref 136–145)

## 2018-03-10 PROCEDURE — 85014 HEMATOCRIT: CPT | Performed by: INTERNAL MEDICINE

## 2018-03-10 PROCEDURE — 85018 HEMOGLOBIN: CPT | Performed by: INTERNAL MEDICINE

## 2018-03-10 PROCEDURE — 90686 IIV4 VACC NO PRSV 0.5 ML IM: CPT | Performed by: INTERNAL MEDICINE

## 2018-03-10 PROCEDURE — 80048 BASIC METABOLIC PNL TOTAL CA: CPT | Performed by: INTERNAL MEDICINE

## 2018-03-10 RX ORDER — PANTOPRAZOLE SODIUM 40 MG/1
40 TABLET, DELAYED RELEASE ORAL
Qty: 90 TABLET | Refills: 0 | Status: SHIPPED | OUTPATIENT
Start: 2018-03-10 | End: 2018-08-31

## 2018-03-10 RX ORDER — NADOLOL 20 MG/1
10 TABLET ORAL DAILY
Qty: 90 TABLET | Refills: 0 | Status: SHIPPED | OUTPATIENT
Start: 2018-03-11 | End: 2018-08-31

## 2018-03-10 RX ORDER — NICOTINE 21 MG/24HR
1 PATCH, TRANSDERMAL 24 HOURS TRANSDERMAL DAILY
Qty: 28 PATCH | Refills: 3 | Status: SHIPPED | OUTPATIENT
Start: 2018-03-11 | End: 2018-08-31

## 2018-03-10 RX ADMIN — INFLUENZA VIRUS VACCINE 0.5 ML: 15; 15; 15; 15 SUSPENSION INTRAMUSCULAR at 15:57

## 2018-03-10 RX ADMIN — PANTOPRAZOLE SODIUM 40 MG: 40 TABLET, DELAYED RELEASE ORAL at 15:57

## 2018-03-10 RX ADMIN — CEFTRIAXONE 1000 MG: 1 INJECTION, SOLUTION INTRAVENOUS at 15:58

## 2018-03-10 RX ADMIN — PANTOPRAZOLE SODIUM 40 MG: 40 TABLET, DELAYED RELEASE ORAL at 06:23

## 2018-03-10 RX ADMIN — NICOTINE 14 MG: 14 PATCH, EXTENDED RELEASE TRANSDERMAL at 08:14

## 2018-03-10 RX ADMIN — NADOLOL 10 MG: 20 TABLET ORAL at 08:13

## 2018-03-10 NOTE — DISCHARGE SUMMARY
[facility] Discharge Summary - Medical Muriel Talavera 65 y o  male MRN: [id number]    [address] [facility]  Room / Bed: 99 [facility] 723/[facility] 723-01 Encounter: [id number]    BRIEF OVERVIEW    Admitting Provider: Amelia Brown DO  Discharge Provider: Sheela Zhao MD  Primary Care Physician at Discharge: none    Discharge To:  home      Admission Date: 3/8/2018     Discharge Date: 3/10/2018  5:30 PM    Primary Discharge Diagnosis  Principal Problem:    Blood loss anemia  Active Problems:    Leg swelling    Hepatic cirrhosis (HCC)    GI bleed    Angina pectoris ([facility] )    Chronic hepatitis C virus infection ([facility] )    Nicotine dependence  Resolved Problems:    S[facility]ic inflammatory response syndrome) [facility] )      Other Problems Addressed: none    Consulting Providers - none    Therapeutic Operative Procedures Performed - EGD - Esophagitis seen  Trena-Khalil tear found in the distal  third of the esophagus  Epinephrine injection therapy was applied to  control bleeding  Clips were applied to control bleeding  Small varices found in the distal third of the esophagus and cardia  Gastritis found in the entire stomach  Normal, symmetrical, and patent pylorus  Normal  duodenal bulb and 2nd portion of the duodenum  Diagnostic Procedures Performed - chest x-ray - no acute cardiopulmonary disease    Ultrasound of the abdominal aorta - mildly ectatic abdominal aorta, measuring up to 2 5 cm in maximal diameter    Discharge Disposition: Home/Self Care  Discharged With Lines: no    Test Results Pending at Discharge: none    Outpatient Follow-Up - Patient was advised to schedule outpatient follow-up at [facility]  Contact informations is provided  Follow up with consulting providers - none  Active Issues Requiring Follow-up - none    Code Status: Level 1 - Full Code    Medications   See after visit summary for reconciled discharge medications provided to patient and family      Your Medications   Your Medications     Morning Afternoon Evening Bedtime As Needed    nadolol 20 mg tablet   Commonly known as: CORGARD   Start taking on: 3/11/2018   Take 0 5 tablets (10 mg total) by mouth daily   Refills: 0   Next Dose Due: 3/12 8am           nicotine 14 mg/24hr TD 24 hr patch   Commonly known as: [place] Peeks   Start taking on: 3/11/2018   Place 1 patch on the skin daily   Refills: 3   Next Dose Due: 3/11 8am           pantoprazole 40 mg tablet   Commonly known as: PROTONIX   Take 1 tablet (40 mg total) by mouth 2 (two) times a day before meals   Refills: 0   Next Dose Due: 3/11 8am               Allergies  No Known Allergies  Discharge Diet: regular diet  Activity restrictions: none    [address] [facility] Course - Will Gonzalez is a 65 y o  male with a past medical history significant for hepatitis C, cirrhosis, remote history of gastric ulcers, right wrist fracture (status post fixation), presented emergency department for evaluation of bilateral lower extremity edema/shortness of breath with exertion/chest pain for the last approximately 2 weeks  Patient was found to have hemoglobin 5 5  Patient received 2 units of blood with improvement in hemoglobin to 6 8  Patient was evaluated by gastroenterology and EGD was performed  EGD showed gastritis, esophagitis, Trena-Khalil tear and esophageal varices  Patient was started on ceftriaxone, protonix and nadolol  In the course of hospitalization patient's hemoglobin was stable and we were able to discharge patient home  Patient was admitted to medicine service  His problems were addressed as follows:    Liver cirrhosis 2/2 hepatitis C and alcohol abuse - Patient was started on Protonix drip and ceftriaxone IV 1000 mg every 24 hours  His diet was progressively advanced throughout the hospitalization  Patient will need outpatient follow-up for liver cirrhosis ([facility] [address] ) and EGD surveillance for esophageal varices    Patient was advised not to take any more of NSAIDs  Patient's MELD score was 11 and chronic hepatitis panel showed highly reactive hepatitis C Ab       Upper GI bleed 2/2 - Esophagitis/gastritis/Trena Khalil tear in distal third of esophagus - per EGD     Acute blood loss anemia 2/2 upper GI bleed - Hb 7 4  Patient's hemoglobin was stable  Patient received 2 units of blood in the course of hospitalization      Small varices in the distal 1/3 of esophagus - Patient was started on nadolol 10mg daily  He will need outpatient GI follow up and EGD surveillance yearly      Bilateral lower extremity swelling - 2/2 low albumin  Lower extremity swelling has markedly improved in the course of hospitalization      Tobacco abuse - Nicotine patch was provided      Screening colonoscopy - Patient will need to have screening colonoscopy to be done as an outpatient  Presenting Problem/History of Present Illness  Principal Problem:    Blood loss anemia  Active Problems:    Leg swelling    Hepatic cirrhosis (HCC)    GI bleed    Angina pectoris (HCC)    Chronic hepatitis C virus infection (HCC)    Nicotine dependence  Resolved Problems:    SIRS (systemic inflammatory response syndrome) ([address] )    Other Pertinent Test Results     Discharge Condition: good    Discharge  Statement   I spent 30 minutes minutes discharging the patient  This time was spent on the day of discharge  I had direct contact with the patient on the day of discharge  Additional documentation is required if more than 30 minutes were spent on discharge  The care was coordinated with Gastroenterology, nursing staff, case management

## 2018-03-10 NOTE — PROGRESS NOTES
IM Residency Progress Note   Unit/Bed#: [facility] 723-01 Encounter: [id number]  SOD Team A      Sherryle Lurie 65 y o  male [id number]    Hospital Stay Days: 2    Assessment/Plan:    Liver cirrhosis 2/2 hepatitis C and alcohol abuse - MELD score 11 -   - chronic hepatitis panel - high reactive hepatitis C Ab  - protonix gtt, ceftriaxone 1000mg every 24 hours - 3/5   - currently on clears, advance diet per gastroenterology  - will need follow-up for [facility] [address]  screening and varices screening  - patient was advised to hold off NSAIDs (was taking 3 pills of ibuprofen daily), advised to take tylenol in the future    Upper GI bleed 2/2 - Esophagitis/gastritis/Trena Khalil tear in distal third of esophagus - per EGD    Acute blood loss anemia 2/2 upper GI bleed - Hb 7 4  - will hold off transfusion for now, currently patient asymptomatic  - monitor H and H every 8 hours  - patient continues to have melena    Small varices in distal 1/3 of esophagus - continue nadolol 10mg daily  - will need outpatient GI follow up and EGD surveillance yearly    Bilateral lower extremity swelling - 2/2 low albumin  - albumin - 2 3  - much improved since admission    Tobacco abuse - nicotine patch     Screening colonoscopy - patient above 48years of age  - to be done as an outpatient        Principal Problem:    Blood loss anemia  Active Problems:    Leg swelling    Hepatic cirrhosis (HCC)    GI bleed    Angina pectoris (HCC)    Chronic hepatitis C virus infection (HCC)    Nicotine dependence    SIRS (systemic inflammatory response syndrome) (HCC)    Disposition: per SOD, gastroenterology     Subjective: Patient has no complaints         Vitals: Temp (24hrs), Av 1 °F (36 7 °C), Min:98 °F (36 7 °C), Max:98 3 °F (36 8 °C)  Current: Temperature: 98 °F (36 7 °C)  Vitals:    18 0724 18 1523 18 2354 03/10/18 0654   BP: 136/69 114/62 122/71 139/81   BP Location: Right arm Right arm Right arm Right arm   Pulse: 80 79 75 82   Resp: 18 18 18 18   Temp: 97 6 °F (36 4 °C) 98 3 °F (36 8 °C) 98 °F (36 7 °C) 98 °F (36 7 °C)   TempSrc: Oral Oral Oral Oral   SpO2: 97% 99% 99% 99%   Weight:       Height:        Body mass index is 22 81 kg/m²  I/O last 24 hours: In: 1060 [P O :1060]  Out: -       Physical Exam:   Physical Exam   Constitutional: He appears well-developed and well-nourished  HENT:   Head: Normocephalic and atraumatic  Eyes: Pupils are equal, round, and reactive to light  Right eye exhibits no discharge  Left eye exhibits no discharge  No scleral icterus  Neck: Neck supple  Cardiovascular: Normal rate and regular rhythm  No murmur heard  Pulmonary/Chest: Effort normal and breath sounds normal  No respiratory distress  Abdominal: Soft  Bowel sounds are normal  He exhibits no distension  There is no tenderness  Musculoskeletal:   Bilateral lower extremity edema   Neurological: He is alert  Skin: Skin is warm and dry  Psychiatric: He has a normal mood and affect          Invasive Devices     Peripheral Intravenous Line            Peripheral IV 03/08/18 Left Forearm 2 days                Labs:   Recent Results (from the past 24 hour(s))   Chronic Hepatitis Panel    Collection Time: 03/09/18 11:49 AM   Result Value Ref Range    Hepatitis B Surface Ag Non-reactive Non-reactive, NonReactive - Confirmed    Hepatitis C Ab High Reactive (A) Non-reactive    Hep B C IgM Non-reactive Non-reactive    Hep B Core Total Ab Non-reactive Non-reactive   Hemoglobin and hematocrit, blood    Collection Time: 03/09/18  4:33 PM   Result Value Ref Range    Hemoglobin 7 5 (L) 12 0 - 17 0 g/dL    Hematocrit 23 1 (L) 36 5 - 49 3 %   Hemoglobin and hematocrit, blood    Collection Time: 03/10/18 12:41 AM   Result Value Ref Range    Hemoglobin 7 4 (L) 12 0 - 17 0 g/dL    Hematocrit 23 0 (L) 36 5 - 49 3 %   Basic metabolic panel    Collection Time: 03/10/18  4:54 AM   Result Value Ref Range    Sodium 141 136 - 145 mmol/L    Potassium 4 0 3 5 - 5 3 mmol/L    Chloride 110 (H) 100 - 108 mmol/L    CO2 25 21 - 32 mmol/L    Anion Gap 6 4 - 13 mmol/L    BUN 8 5 - 25 mg/dL    Creatinine 0 79 0 60 - 1 30 mg/dL    Glucose 102 65 - 140 mg/dL    Calcium 7 5 (L) 8 3 - 10 1 mg/dL    eGFR 100 ml/min/1 73sq m   Hemoglobin and hematocrit, blood    Collection Time: 03/10/18  8:39 AM   Result Value Ref Range    Hemoglobin 7 6 (L) 12 0 - 17 0 g/dL    Hematocrit 23 6 (L) 36 5 - 49 3 %       Radiology Results: I have personally reviewed pertinent reports  Other Diagnostic Testing:   I have personally reviewed pertinent reports          Active Meds:   Current Facility-Administered Medications   Medication Dose Route Frequency    acetaminophen (TYLENOL) tablet 650 mg  650 mg Oral Q8H PRN    cefTRIAXone (ROCEPHIN) IVPB (premix) 1,000 mg  1,000 mg Intravenous Q24H    influenza inactivated quadrivalent vaccine (FLULAVAL) IM injection 0 5 mL  0 5 mL Intramuscular Prior to discharge    nadolol (CORGARD) tablet 10 mg  10 mg Oral Daily    nicotine (NICODERM CQ) 14 mg/24hr TD 24 hr patch 14 mg  14 mg Transdermal Daily    nitroglycerin (NITROSTAT) SL tablet 0 4 mg  0 4 mg Sublingual Q5 Min PRN    pantoprazole (PROTONIX) EC tablet 40 mg  40 mg Oral BID AC         VTE Pharmacologic Prophylaxis: Reason for no pharmacologic prophylaxis GI bleed  VTE Mechanical Prophylaxis: sequential compression device    Alfred Paul MD

## 2018-03-10 NOTE — DISCHARGE INSTRUCTIONS
Cirrhosis   WHAT YOU NEED TO KNOW:   Cirrhosis is long-term scarring of the liver  The liver makes enzymes and bile that help digest food and gives your body energy  It also removes harmful material from your body, such as alcohol and other chemicals  Cirrhosis is caused by repeated damage to your liver over time  Scar tissue starts to replace healthy liver tissue  The scar tissue prevents the liver from working properly  DISCHARGE INSTRUCTIONS:   Return to the emergency department if:   · You have pain during a bowel movement and it is black or contains blood  · You have a fast heart rate and fast breathing  · You are dizzy or confused  · You have severe pain in your abdomen  · You have trouble breathing  · Your vomit looks like it has coffee grinds or blood in it  Contact your healthcare provider if:   · You have a fever  · You have red or itchy skin  · You are in pain and feel weak  · You have questions or concerns about your condition or care  Medicines: You may need medicine to treat the cause of your cirrhosis  You may also need medicine to treat any health problems caused by cirrhosis  · Antiviral medicine  may be needed if your cirrhosis is caused by hepatitis  Antiviral medicine may prevent or decrease swelling and damage to your liver  · Blood pressure medicine  is used to treat high blood pressure in the portal vein (the vein that goes to your liver)  · Diuretics  decrease extra fluid that collects in a part of your body, such as your legs and abdomen  Diuretics can also decrease your blood pressure  You will urinate more often when you take this medicine  · Antibiotics  help prevent or treat a bacterial infection  · Take your medicine as directed  Contact your healthcare provider if you think your medicine is not helping or if you have side effects  Tell him or her if you are allergic to any medicine  Keep a list of the medicines, vitamins, and herbs you take  Include the amounts, and when and why you take them  Bring the list or the pill bottles to follow-up visits  Carry your medicine list with you in case of an emergency  Do not drink alcohol:  Alcohol will cause more damage to your liver  Manage cirrhosis:   · Do not smoke  Nicotine and other chemicals in cigarettes and cigars can cause blood vessel and lung damage  Ask your healthcare provider for information if you currently smoke and need help to quit  E-cigarettes or smokeless tobacco still contain nicotine  Talk to your healthcare provider before you use these products  · Eat a variety of healthy foods  Healthy foods include fruits, vegetables, whole-grain breads, low-fat dairy products, beans, lean meat, and fish  Ask if you need to be on a special diet  · Reach or maintain a healthy weight  You may develop fatty liver disease if you are overweight  Ask your healthcare provider for a healthy weight for you  He can help you create a safe weight loss plan if you are overweight  · Limit sodium (salt)  You may need to decrease the amount of sodium you eat if you have swelling caused by fluid buildup  Sodium is found in table salt and salty foods such as canned foods, frozen foods, and potato chips  · Drink liquids as directed  Ask how much liquid to drink each day and which liquids are best for you  For most people, good liquids to drink are water, juice, and milk  Liquids can help your liver work better  · Ask about vaccines  You may have a hard time fighting infection because of cirrhosis  Vaccines help protect you against viruses that can cause diseases such as the flu or hepatitis  Viral hepatitis is caused by a virus that leads to inflammation of the liver  You may need a hepatitis A or B vaccine  You may also need a pneumonia vaccine  Always get a flu vaccine each year as soon as it becomes available  · Ask about medicines  Some medicines can harm your liver   Acetaminophen is an example  Talk to your healthcare provider about all your medicines  Do not take any over-the-counter medicine or herbal supplements until your healthcare provider says it is okay  Follow up with your healthcare provider as directed:  Write down your questions so you remember to ask them during your visits  © 2017 [employer] Information is for End User's use only and may not be sold, redistributed or otherwise used for commercial purposes  All illustrations and images included in CareNotes® are the copyrighted property of [employer]  or [employer]  The above information is an  only  It is not intended as medical advice for individual conditions or treatments  Talk to your doctor, nurse or pharmacist before following any medical regimen to see if it is safe and effective for you

## 2018-03-11 LAB
ABO GROUP BLD BPU: NORMAL
BPU ID: NORMAL
CROSSMATCH: NORMAL
UNIT DISPENSE STATUS: NORMAL
UNIT PRODUCT CODE: NORMAL
UNIT RH: NORMAL

## 2018-03-13 LAB
HCV RNA SERPL NAA+PROBE-ACNC: NORMAL IU/ML
HCV RNA SERPL NAA+PROBE-LOG IU: 5.42 LOG10 IU/ML
LABORATORY COMMENT REPORT: NORMAL
TEST INFORMATION: NORMAL

## 2018-03-15 PROBLEM — R19.5 HEME POSITIVE STOOL: Status: ACTIVE | Noted: 2018-03-15

## 2018-03-15 PROBLEM — I85.11 SECONDARY ESOPHAGEAL VARICES WITH BLEEDING (HCC): Status: ACTIVE | Noted: 2018-03-15

## 2018-03-15 PROBLEM — K22.6 MALLORY-WEISS TEAR: Status: ACTIVE | Noted: 2018-03-15

## 2018-03-27 ENCOUNTER — HOSPITAL ENCOUNTER (EMERGENCY)
Facility: HOSPITAL | Age: 58
Discharge: HOME | End: 2018-03-27
Attending: EMERGENCY MEDICINE | Admitting: EMERGENCY MEDICINE

## 2018-03-27 ENCOUNTER — APPOINTMENT (EMERGENCY)
Dept: RADIOLOGY | Facility: HOSPITAL | Age: 58
End: 2018-03-27

## 2018-03-27 VITALS
SYSTOLIC BLOOD PRESSURE: 137 MMHG | HEIGHT: 72 IN | OXYGEN SATURATION: 97 % | BODY MASS INDEX: 22.75 KG/M2 | WEIGHT: 168 LBS | TEMPERATURE: 95 F | HEART RATE: 68 BPM | DIASTOLIC BLOOD PRESSURE: 62 MMHG | RESPIRATION RATE: 14 BRPM

## 2018-03-27 DIAGNOSIS — G47.00 INSOMNIA: ICD-10-CM

## 2018-03-27 DIAGNOSIS — R53.1 GENERALIZED WEAKNESS: Primary | ICD-10-CM

## 2018-03-27 LAB
ABO GROUP BLD: NORMAL
ALBUMIN SERPL BCP-MCNC: 2.9 G/DL (ref 3.5–5)
ALP SERPL-CCNC: 122 U/L (ref 46–116)
ALT SERPL W P-5'-P-CCNC: 98 U/L (ref 12–78)
AMMONIA PLAS-SCNC: 18 UMOL/L (ref 11–35)
ANION GAP SERPL CALCULATED.3IONS-SCNC: 10 MMOL/L (ref 4–13)
APTT PPP: 26 SECONDS (ref 23–35)
AST SERPL W P-5'-P-CCNC: 163 U/L (ref 5–45)
ATRIAL RATE: 72 BPM
BASOPHILS # BLD AUTO: 0.03 THOUSANDS/ΜL (ref 0–0.1)
BASOPHILS NFR BLD AUTO: 1 % (ref 0–1)
BILIRUB SERPL-MCNC: 0.67 MG/DL (ref 0.2–1)
BLD GP AB SCN SERPL QL: NEGATIVE
BUN SERPL-MCNC: 13 MG/DL (ref 5–25)
CALCIUM SERPL-MCNC: 8.6 MG/DL (ref 8.3–10.1)
CHLORIDE SERPL-SCNC: 106 MMOL/L (ref 100–108)
CO2 SERPL-SCNC: 24 MMOL/L (ref 21–32)
CREAT SERPL-MCNC: 1.1 MG/DL (ref 0.6–1.3)
EOSINOPHIL # BLD AUTO: 0.08 THOUSAND/ΜL (ref 0–0.61)
EOSINOPHIL NFR BLD AUTO: 2 % (ref 0–6)
ERYTHROCYTE [DISTWIDTH] IN BLOOD BY AUTOMATED COUNT: 17.4 % (ref 11.6–15.1)
GFR SERPL CREATININE-BSD FRML MDRD: 75 ML/MIN/1.73SQ M
GLUCOSE SERPL-MCNC: 131 MG/DL (ref 65–140)
HCT VFR BLD AUTO: 28 % (ref 36.5–49.3)
HGB BLD-MCNC: 8.7 G/DL (ref 12–17)
INR PPP: 1.16 (ref 0.86–1.16)
LIPASE SERPL-CCNC: 258 U/L (ref 73–393)
LYMPHOCYTES # BLD AUTO: 0.98 THOUSANDS/ΜL (ref 0.6–4.47)
LYMPHOCYTES NFR BLD AUTO: 22 % (ref 14–44)
MCH RBC QN AUTO: 24.9 PG (ref 26.8–34.3)
MCHC RBC AUTO-ENTMCNC: 31.1 G/DL (ref 31.4–37.4)
MCV RBC AUTO: 80 FL (ref 82–98)
MONOCYTES # BLD AUTO: 0.66 THOUSAND/ΜL (ref 0.17–1.22)
MONOCYTES NFR BLD AUTO: 15 % (ref 4–12)
NEUTROPHILS # BLD AUTO: 2.8 THOUSANDS/ΜL (ref 1.85–7.62)
NEUTS SEG NFR BLD AUTO: 60 % (ref 43–75)
NRBC BLD AUTO-RTO: 0 /100 WBCS
P AXIS: 37 DEGREES
PLATELET # BLD AUTO: 149 THOUSANDS/UL (ref 149–390)
PMV BLD AUTO: 9.1 FL (ref 8.9–12.7)
POTASSIUM SERPL-SCNC: 3.5 MMOL/L (ref 3.5–5.3)
PR INTERVAL: 150 MS
PROT SERPL-MCNC: 8.5 G/DL (ref 6.4–8.2)
PROTHROMBIN TIME: 14.8 SECONDS (ref 12.1–14.4)
QRS AXIS: 11 DEGREES
QRSD INTERVAL: 96 MS
QT INTERVAL: 438 MS
QTC INTERVAL: 479 MS
RBC # BLD AUTO: 3.49 MILLION/UL (ref 3.88–5.62)
RH BLD: POSITIVE
SODIUM SERPL-SCNC: 140 MMOL/L (ref 136–145)
SPECIMEN EXPIRATION DATE: NORMAL
T WAVE AXIS: 43 DEGREES
TROPONIN I SERPL-MCNC: <0.02 NG/ML
VENTRICULAR RATE: 72 BPM
WBC # BLD AUTO: 4.56 THOUSAND/UL (ref 4.31–10.16)

## 2018-03-27 PROCEDURE — 86900 BLOOD TYPING SEROLOGIC ABO: CPT | Performed by: EMERGENCY MEDICINE

## 2018-03-27 PROCEDURE — 71046 X-RAY EXAM CHEST 2 VIEWS: CPT

## 2018-03-27 PROCEDURE — 85610 PROTHROMBIN TIME: CPT | Performed by: EMERGENCY MEDICINE

## 2018-03-27 PROCEDURE — 86850 RBC ANTIBODY SCREEN: CPT | Performed by: EMERGENCY MEDICINE

## 2018-03-27 PROCEDURE — 99285 EMERGENCY DEPT VISIT HI MDM: CPT

## 2018-03-27 PROCEDURE — 83690 ASSAY OF LIPASE: CPT | Performed by: EMERGENCY MEDICINE

## 2018-03-27 PROCEDURE — 96360 HYDRATION IV INFUSION INIT: CPT

## 2018-03-27 PROCEDURE — 93005 ELECTROCARDIOGRAM TRACING: CPT

## 2018-03-27 PROCEDURE — 85730 THROMBOPLASTIN TIME PARTIAL: CPT | Performed by: EMERGENCY MEDICINE

## 2018-03-27 PROCEDURE — 36415 COLL VENOUS BLD VENIPUNCTURE: CPT | Performed by: EMERGENCY MEDICINE

## 2018-03-27 PROCEDURE — 93010 ELECTROCARDIOGRAM REPORT: CPT | Performed by: INTERNAL MEDICINE

## 2018-03-27 PROCEDURE — 86901 BLOOD TYPING SEROLOGIC RH(D): CPT | Performed by: EMERGENCY MEDICINE

## 2018-03-27 PROCEDURE — 84484 ASSAY OF TROPONIN QUANT: CPT | Performed by: EMERGENCY MEDICINE

## 2018-03-27 PROCEDURE — 82140 ASSAY OF AMMONIA: CPT | Performed by: EMERGENCY MEDICINE

## 2018-03-27 PROCEDURE — 80053 COMPREHEN METABOLIC PANEL: CPT | Performed by: EMERGENCY MEDICINE

## 2018-03-27 PROCEDURE — 85025 COMPLETE CBC W/AUTO DIFF WBC: CPT | Performed by: EMERGENCY MEDICINE

## 2018-03-27 RX ADMIN — SODIUM CHLORIDE 1000 ML: 0.9 INJECTION, SOLUTION INTRAVENOUS at 19:00

## 2018-03-27 NOTE — ED PROVIDER NOTES
History  Chief Complaint   Patient presents with    Weakness - Generalized     pt with weakness/ dizzyness has a history of a recent GI bleed and had a low hemeglobin symptoms are similar to previous visit- pt dizzy and cannot think straight      HPI  64 yo male presenting to ER for weakness  Patient states about 3-4 days has felt very weak in addition to feeling like he was about to pass out  Patient was standing up today right before coming to ER and stated his visual field got black and he almost passed out, no LOC  Patient does complain of a headache that started earlier today  Patient denies chest pain, but does complain of SOB  Patient states he was feeling short of breath for 2-3 days  Right before patient came to ER, patient was painting for about an hour before he had near syncope  Patient has had subjective fevers with chills over the past couple days as well  No nausea, no vomiting, no blood in stool, no more dark stools that he is aware of  No hematuria or dysuria  No abdominal pain  Patient has not taken any of his meds since dishcarge from the hospital due to financial reasons  PMH: alcohol/hep c cirrhosis, lyme disease, esophageal varices  Patient does smoke, denies EtOH use currently, no drug use      Prior to Admission Medications   Prescriptions Last Dose Informant Patient Reported?  Taking?   nadolol (CORGARD) 20 mg tablet   No Yes   Sig: Take 0 5 tablets (10 mg total) by mouth daily   nicotine (NICODERM CQ) 14 mg/24hr TD 24 hr patch   No No   Sig: Place 1 patch on the skin daily   pantoprazole (PROTONIX) 40 mg tablet   No Yes   Sig: Take 1 tablet (40 mg total) by mouth 2 (two) times a day before meals      Facility-Administered Medications: None       Past Medical History:   Diagnosis Date    Back pain, chronic     Cirrhosis ([facility] [address] )     Hepatitis     C    Lyme disease        Past Surgical History:   Procedure Laterality Date    ESOPHAGOGASTRODUODENOSCOPY N/A 3/8/2018 Procedure: ESOPHAGOGASTRODUODENOSCOPY (EGD); Surgeon: Elli Ames MD;  Location: [facility]; Service: Gastroenterology    WRIST SURGERY Right     8/8/88       History reviewed  No pertinent family history  I have reviewed and agree with the history as documented  Social History   Substance Use Topics    Smoking status: Current Every Day Smoker     Packs/day: 0 50    Smokeless tobacco: Never Used    Alcohol use Yes      Comment: 1-2        Review of Systems    Constitutional: Positive for appetite change, chills and fever  HENT: Negative for congestion, rhinorrhea and sore throat  Eyes: Negative for photophobia, pain and visual disturbance  Respiratory: Negative for cough, chest tightness and shortness of breath  Cardiovascular: Negative for chest pain, palpitations and leg swelling  Gastrointestinal: Negative for abdominal pain, diarrhea, nausea and vomiting  Genitourinary: Negative for dysuria, flank pain and hematuria  Musculoskeletal: Negative for back pain, neck pain and neck stiffness  Skin: Negative for color change, rash and wound  Neurological: Negative for dizziness, numbness and headaches  All other systems reviewed and are negative      Physical Exam  ED Triage Vitals [03/27/18 1827]   Temperature Pulse Respirations Blood Pressure SpO2   (!) 95 °F (35 °C) 71 16 137/65 100 %      Temp Source Heart Rate Source Patient Position - Orthostatic VS BP Location FiO2 (%)   Tympanic -- -- -- --      Pain Score       5           Orthostatic Vital Signs  Vitals:    03/27/18 1827 03/27/18 1900 03/27/18 1930   BP: 137/65 135/60 137/62   Pulse: 71 74 68       Physical Exam  /62   Pulse 68   Temp (!) 95 °F (35 °C) (Tympanic)   Resp 14   Ht 6' (1 829 m)   Wt 76 2 kg (168 lb)   SpO2 97%   BMI 22 78 kg/m²     General Appearance:  Alert, cooperative, no distress   Head:  Normocephalic, without obvious abnormality, atraumatic   Eyes:  PERRL, conjunctiva/corneas clear, EOM's intact Nose: Nares normal, septum midline, mucosa normal, no drainage or sinus tenderness   Throat: Lips, mucosa, and tongue normal; teeth and gums normal   Neck: Supple, symmetrical, trachea midline, no adenopathy   Back:   Symmetric, no curvature, ROM normal, no CVA tenderness   Lungs:   Clear to auscultation bilaterally, respirations unlabored   Chest Wall:  No tenderness or deformity   Heart:  Regular rate and rhythm, S1, S2 normal, no murmur, rub or gallop   Abdomen:   Soft, non-tender, bowel sounds active all four quadrants       Rectal:  Normal tone, no masses or tenderness;  guaiac negative stool   Extremities: Extremities normal, atraumatic, no cyanosis or edema   Pulses: 2+ and symmetric   Skin: Skin color, texture, turgor normal, no rashes or lesions       Neurologic:      Psychiatric:  Moves all extremities, sensation and strength in tact in all extremities    Normal mood and affect         ED Medications  Medications   sodium chloride 0 9 % bolus 1,000 mL (0 mL Intravenous Stopped 3/27/18 2000)       Diagnostic Studies  Results Reviewed     Procedure Component Value Units Date/Time    Ammonia [[id number]]  (Normal) Collected:  03/27/18 1855    Lab Status:  Final result Specimen:  Blood from Arm, Right Updated:  03/27/18 1933     Ammonia 18 umol/L     Troponin I [[id number]]  (Normal) Collected:  03/27/18 1855    Lab Status:  Final result Specimen:  Blood from Arm, Right Updated:  03/27/18 1925     Troponin I <0 02 ng/mL     Narrative:         [employer] Chemistry analyzer 99% cutoff is > 0 04 ng/mL in network labs    o cTnI 99% cutoff is useful only when applied to patients in the clinical setting of myocardial ischemia  o cTnI 99% cutoff should be interpreted in the context of clinical history, ECG findings and possibly cardiac imaging to establish correct diagnosis  o cTnI 99% cutoff may be suggestive but clearly not indicative of a coronary event without the clinical setting of myocardial ischemia  Comprehensive metabolic panel [[id number]]  (Abnormal) Collected:  03/27/18 1855    Lab Status:  Final result Specimen:  Blood from Arm, Right Updated:  03/27/18 1924     Sodium 140 mmol/L      Potassium 3 5 mmol/L      Chloride 106 mmol/L      CO2 24 mmol/L      Anion Gap 10 mmol/L      BUN 13 mg/dL      Creatinine 1 10 mg/dL      Glucose 131 mg/dL      Calcium 8 6 mg/dL       (H) U/L      ALT 98 (H) U/L      Alkaline Phosphatase 122 (H) U/L      Total Protein 8 5 (H) g/dL      Albumin 2 9 (L) g/dL      Total Bilirubin 0 67 mg/dL      eGFR 75 ml/min/1 73sq m     Narrative:         National Kidney Disease Education Program recommendations are as follows:  GFR calculation is accurate only with a steady state creatinine  Chronic Kidney disease less than 60 ml/min/1 73 sq  meters  Kidney failure less than 15 ml/min/1 73 sq  meters  Lipase [[id number]]  (Normal) Collected:  03/27/18 1855    Lab Status:  Final result Specimen:  Blood from Arm, Right Updated:  03/27/18 1924     Lipase 258 u/L     Protime-INR [[id number]]  (Abnormal) Collected:  03/27/18 1855    Lab Status:  Final result Specimen:  Blood from Arm, Right Updated:  03/27/18 1919     Protime 14 8 (H) seconds      INR 1 16    APTT [[id number]]  (Normal) Collected:  03/27/18 1855    Lab Status:  Final result Specimen:  Blood from Arm, Right Updated:  03/27/18 1919     PTT 26 seconds     Narrative:          Therapeutic Heparin Range = 60-90 seconds    CBC and differential [[id number]]  (Abnormal) Collected:  03/27/18 1855    Lab Status:  Final result Specimen:  Blood from Arm, Right Updated:  03/27/18 1916     WBC 4 56 Thousand/uL      RBC 3 49 (L) Million/uL      Hemoglobin 8 7 (L) g/dL      Hematocrit 28 0 (L) %      MCV 80 (L) fL      MCH 24 9 (L) pg      MCHC 31 1 (L) g/dL      RDW 17 4 (H) %      MPV 9 1 fL      Platelets 893 Thousands/uL      nRBC 0 /100 WBCs      Neutrophils Relative 60 %      Lymphocytes Relative 22 %      Monocytes Relative 15 (H) % Eosinophils Relative 2 %      Basophils Relative 1 %      Neutrophils Absolute 2 80 Thousands/µL      Lymphocytes Absolute 0 98 Thousands/µL      Monocytes Absolute 0 66 Thousand/µL      Eosinophils Absolute 0 08 Thousand/µL      Basophils Absolute 0 03 Thousands/µL                  XR chest 2 views   Final Result by Rosi Herr MD (03/27 1951)      No acute cardiopulmonary disease  Workstation performed: [id number]               Procedures  ECG 12 Lead Documentation  Date/Time: 3/27/2018 7:44 PM  Performed by: Kim Harvey by: David Castro     Indications / Diagnosis:  Weakness  ECG reviewed by me, the ED Provider: yes    Patient location:  ED  Previous ECG:     Previous ECG:  Compared to current    Similarity:  No change  Interpretation:     Interpretation: normal    Rate:     ECG rate:  72    ECG rate assessment: normal    Rhythm:     Rhythm: sinus rhythm    Ectopy:     Ectopy: none    QRS:     QRS axis:  Normal    QRS intervals:  Normal  Conduction:     Conduction: normal    ST segments:     ST segments:  Normal  T waves:     T waves: normal            Phone Consults  ED Phone Contact    ED Course  ED Course as of Mar 28 1014   Tue Mar 27, 2018   2026 Labs unremarkable, patient sleeping comfortably  Fluids going in, will DC home with f/u to PCP after fluids go in     2029 Patient is stating that he is not sleeping very well, he says because his mind is racing at night  MDM   Patient with possible anemia causing his symptoms given recent hospitalization  Patient may be overwhelmed by returning to work too early  Will check cardiac work up, cbc, and give IV fluid bolus, reassess      CritCare Time    Disposition  Final diagnoses:   Generalized weakness   Insomnia     Time reflects when diagnosis was documented in both MDM as applicable and the Disposition within this note     Time User Action Codes Description Comment    3/27/2018  8:15 PM Lito Ham Add [R53 1] Generalized weakness     3/27/2018  8:29 PM Sherren Homme Add [G47 00] Insomnia       ED Disposition     ED Disposition Condition Comment    Discharge  Thompson Reich discharge to home/self care  Condition at discharge: Stable        Follow-up Information     Follow up With Specialties Details Why Contact Info Additional [address] in 2 days  [address] [facility] [address] [facility] Emergency Department Emergency Medicine  If symptoms worsen [address]  [phone]  ED, [address] [place] [address], [id number]        Discharge Medication List as of 3/27/2018  8:30 PM      START taking these medications    Details   doxylamine (UNISON) 25 MG tablet Take 0 5 tablets (12 5 mg total) by mouth daily at bedtime as needed for sleep for up to 14 days, Starting Tue 3/27/2018, Until Tue 4/10/2018, Print         CONTINUE these medications which have NOT CHANGED    Details   nadolol (CORGARD) 20 mg tablet Take 0 5 tablets (10 mg total) by mouth daily, Starting Sun 3/11/2018, Print      pantoprazole (PROTONIX) 40 mg tablet Take 1 tablet (40 mg total) by mouth 2 (two) times a day before meals, Starting Sat 3/10/2018, Print      nicotine (NICODERM CQ) 14 mg/24hr TD 24 hr patch Place 1 patch on the skin daily, Starting Sun 3/11/2018, Print           No discharge procedures on file  ED Provider  Attending physically available and evaluated Thompson Reich I managed the patient along with the ED Attending      Electronically Signed by         General Tonia MD  03/28/18 0266

## 2018-03-27 NOTE — ED ATTENDING ATTESTATION
Patricia Kwok MD, saw and evaluated the patient  I have discussed the patient with the resident/non-physician practitioner and agree with the resident's/non-physician practitioner's findings, Plan of Care, and MDM as documented in the resident's/non-physician practitioner's note, except where noted  All available labs and Radiology studies were reviewed  At this point I agree with the current assessment done in the Emergency Department    I have conducted an independent evaluation of this patient a history and physical is as follows:    Weak and tired mild sob for several days   Mild frontal HA   No fever   Was admitted for blood loss anemia   From a cody allen tear and known varices   Cirrhosis   Hep c and alcohol use  Had blood transfusion   With hgb up   to 7 7    EGD with no active bleeding   Exam anicteric   Conj pink   Lungs clear heart rrr no m abd soft nt nd  Pos   Rectal heme neg   Impression generalized weakness rule out anemia rule out renal failure rule out electrolyte abnormality        Critical Care Time  CritCare Time    Procedures

## 2018-03-28 NOTE — DISCHARGE INSTRUCTIONS
Weakness   WHAT YOU NEED TO KNOW:   Weakness is a loss of muscle strength  It may be caused by brain, nerve, or muscle problems  Physical and mental conditions such as heart problems, pregnancy, dehydration, or depression may also cause weakness  Reactions to certain drugs can cause weakness  Parts of your body may become weak if you need to wear a cast or splint or have been on bed rest for a long time  DISCHARGE INSTRUCTIONS:   Call 911 for any of the following:   · You have any of the following signs of a stroke:      ¨ Numbness or drooping on one side of your face     ¨ Weakness in an arm or leg    ¨ Confusion or difficulty speaking    ¨ Dizziness, a severe headache, or vision loss    · You lose feeling in your weakened body area  · You have electric shock-like feelings down your arms and legs when you flex or move your neck  · You have sudden or increased trouble speaking, swallowing, or breathing  Seek care immediately if:   · You have severe pain in your back, arms, or legs that worsens  · You have sudden or worsened muscle weakness or loss of movement  · You are not able to control when you urinate or have a bowel movement  Contact your healthcare provider if:   · You feel depressed or anxious  · You have questions or concerns about your condition or care  Manage weakness:   · Use assistive devices as directed  These help protect you from injury  Examples include a walker or cane  Have someone install handrails in your home  These will help you get out of a bathtub or stand up from a toilet  Use a shower chair so you can sit while you shower  Sit down on the toilet or another chair to dry off and put on your clothes  Get help going up and down stairs if your legs are weak  · Go to physical or occupational therapy if directed  A physical therapist can teach you exercises to help strengthen weak muscles   An occupational therapist can show you ways to do your daily activities more easily  For example, light forks and spoons can be easier to use if you have hand weakness  You may also learn ways to organize your household items so you are not moving heavy items  · Balance rest with exercise  Exercise can help increase your muscle strength and energy  Do not exercise for long periods at a time  Take breaks often to rest  Too much exercise can cause muscle strain or make you more tired  Ask your healthcare provider how much exercise is right for you  · Eat a variety of healthy foods  Too much or too little food may cause weakness or tiredness  Ask your healthcare provider what a healthy amount of food is for you  Healthy foods include fruits, vegetables, whole-grain breads, low-fat dairy products, lean meats and fish, nuts, and cooked beans  · Do not smoke  Nicotine and other chemicals in cigarettes and cigars can make your symptoms worse, and can cause lung damage  Ask your healthcare provider for information if you currently smoke and need help to quit  E-cigarettes or smokeless tobacco still contain nicotine  Talk to your healthcare provider before you use these products  · Do not use caffeine, alcohol, or illegal drugs  These may cause muscle twitching, which could lead to worsened weakness  Follow up with your healthcare provider as directed:  Write down your questions so you remember to ask them during your visits  © 2017 [employer] [employer]  Information is for End User's use only and may not be sold, redistributed or otherwise used for commercial purposes  All illustrations and images included in CareNotes® are the copyrighted property of [employer]  or [address]  The above information is an  only  It is not intended as medical advice for individual conditions or treatments  Talk to your doctor, nurse or pharmacist before following any medical regimen to see if it is safe and effective for you      Insomnia   WHAT YOU NEED TO KNOW: Insomnia is a condition that makes it hard to fall or stay asleep  Lack of sleep can lead to attention or memory problems during the day  You may also be henderson, depressed, clumsy, or have headaches  DISCHARGE INSTRUCTIONS:   Contact your healthcare provider if:   · Your symptoms do not get better, or they get worse  · You begin to use drugs or alcohol to fall asleep  · You have questions or concerns about your condition or care  Medicines:   · Medicines  may help you sleep more regularly or help you feel less anxious  · Take your medicine as directed  Contact your healthcare provider if you think your medicine is not helping or if you have side effects  Tell him or her if you are allergic to any medicine  Keep a list of the medicines, vitamins, and herbs you take  Include the amounts, and when and why you take them  Bring the list or the pill bottles to follow-up visits  Carry your medicine list with you in case of an emergency  What you can do to improve your sleep:   · Create a sleep schedule  This will help you form a sleep routine  Keep a record of your sleep patterns, and any sleeping problems you have  Bring the record to follow-up visits with healthcare providers  · Do not take naps  Naps could make it hard for you to fall asleep at bedtime  · Keep your bedroom cool, quiet, and dark  Turn on white noise, such as a fan, to help you relax  Do not use your bed for any activity that will keep you awake  Do not read, exercise, eat, or watch TV in your bedroom  · Get up if you do not fall asleep within 20 minutes  Move to another room and do something relaxing until you become sleepy  · Limit caffeine, alcohol, and food to earlier in the day  Only drink caffeine in the morning  Do not drink alcohol within 6 hours of bedtime  Do not eat a heavy meal right before you go to bed  · Exercise regularly  Daily exercise may help you sleep better   Do not exercise within 4 hours of bedtime  Follow up with your healthcare provider as directed: Your healthcare provider may refer you for cognitive behavioral therapy  A behavioral therapist may help you find ways to relax, decrease stress, and improve sleep  Write down your questions so you remember to ask them during your visits  © 2017 [address] [employer] Information is for End User's use only and may not be sold, redistributed or otherwise used for commercial purposes  All illustrations and images included in CareNotes® are the copyrighted property of [employer]  or [employer] Eastman  The above information is an  only  It is not intended as medical advice for individual conditions or treatments  Talk to your doctor, nurse or pharmacist before following any medical regimen to see if it is safe and effective for you

## 2018-08-16 ENCOUNTER — OFFICE VISIT (OUTPATIENT)
Dept: INTERNAL MEDICINE CLINIC | Facility: CLINIC | Age: 58
End: 2018-08-16

## 2018-08-16 VITALS
TEMPERATURE: 98.4 F | HEIGHT: 71 IN | DIASTOLIC BLOOD PRESSURE: 88 MMHG | BODY MASS INDEX: 22.47 KG/M2 | SYSTOLIC BLOOD PRESSURE: 130 MMHG | WEIGHT: 160.5 LBS | HEART RATE: 92 BPM

## 2018-08-16 DIAGNOSIS — F17.210 CIGARETTE NICOTINE DEPENDENCE WITHOUT COMPLICATION: Chronic | ICD-10-CM

## 2018-08-16 DIAGNOSIS — F10.11 HISTORY OF ALCOHOL ABUSE: ICD-10-CM

## 2018-08-16 DIAGNOSIS — Z01.30 BLOOD PRESSURE CHECK: ICD-10-CM

## 2018-08-16 DIAGNOSIS — M25.521 PAIN OF BOTH ELBOWS: ICD-10-CM

## 2018-08-16 DIAGNOSIS — I20.9 ANGINA PECTORIS (HCC): ICD-10-CM

## 2018-08-16 DIAGNOSIS — B18.2 CHRONIC HEPATITIS C WITHOUT HEPATIC COMA (HCC): Chronic | ICD-10-CM

## 2018-08-16 DIAGNOSIS — Z59.89 DOES NOT HAVE HEALTH INSURANCE: ICD-10-CM

## 2018-08-16 DIAGNOSIS — M25.522 PAIN OF BOTH ELBOWS: ICD-10-CM

## 2018-08-16 DIAGNOSIS — K70.31 ALCOHOLIC CIRRHOSIS OF LIVER WITH ASCITES (HCC): Primary | Chronic | ICD-10-CM

## 2018-08-16 PROCEDURE — 99204 OFFICE O/P NEW MOD 45 MIN: CPT | Performed by: INTERNAL MEDICINE

## 2018-08-16 RX ORDER — ARM BRACE
EACH MISCELLANEOUS AS NEEDED
Qty: 2 EACH | Refills: 0 | Status: SHIPPED | OUTPATIENT
Start: 2018-08-16 | End: 2018-08-16 | Stop reason: SDUPTHER

## 2018-08-16 RX ORDER — ARM BRACE
EACH MISCELLANEOUS AS NEEDED
Qty: 2 EACH | Refills: 0 | Status: SHIPPED | OUTPATIENT
Start: 2018-08-16 | End: 2018-08-31

## 2018-08-16 SDOH — ECONOMIC STABILITY - INCOME SECURITY: OTHER PROBLEMS RELATED TO HOUSING AND ECONOMIC CIRCUMSTANCES: Z59.89

## 2018-08-16 NOTE — ASSESSMENT & PLAN NOTE
· Prescription for braces to put on elbows bilaterally  · Decreased work of elbow as much as possible    · Discussed on them with patient

## 2018-08-16 NOTE — PROGRESS NOTES
ASSESSMENT/PLAN:  Problem List Items Addressed This Visit     Hepatic cirrhosis ([facility] [address] ) - Primary (Chronic)     · Referral to Gastroenterology  · Nadolol and Protonix previously prescribed, but patient was unable to fill prescription due to no insurance   · CMP  · Continue to monitor         Relevant Orders    Ambulatory referral to Gastroenterology    Comprehensive metabolic panel    RESOLVED: Angina pectoris ([facility] [address] )    Chronic hepatitis C virus infection ([facility] ) (Chronic)    Relevant Orders    Ambulatory referral to Gastroenterology    Nicotine dependence (Chronic)     · Discussed cessation with patient         History of alcohol abuse     · Denies any current use         Blood pressure check     · Stable  · Continue to monitor         Pain of both elbows     · Prescription for braces to put on elbows bilaterally  · Decreased work of elbow as much as possible  · Discussed on them with patient         Relevant Medications    Elastic Bandages & Supports ([address]) [facility]      Other Visit Diagnoses     Does not have health insurance        Relevant Orders    Ambulatory referral to Social Work          Health Maintenance:  Colonoscopy-never had 1 done, but has no insurance so is unable to currently        CHIEF COMPLAINT:  Blood pressure check    HISTORY OF PRESENT ILLNESS:  41-year-old male presents to the outpatient clinic for a blood pressure check and multiple other complaints  Patient states that he has joint pain due to a history of Lyme disease which he was previously treated for  He complains of pain in all joints, especially his knee, and is taking a "white pill" which she gets from his sister, but is unsure what the pill is  He does not take any other pain medication for this pain because when he was in the hospital back in March 2018 he was told not to take any NSAIDs or Tylenol    Patient is also complaining of depression with issues sleeping, increased fatigue, decreased appetite, and decreased activity  He denies any suicidal or homicidal ideations  His history is significant for 1 past suicidal attempt or he states he took a "bunch of tranquilizer pills and was hoping he would not wake up"  Patient has never been to a psychiatrist or therapist and has not been on any depression medications previously  Patient also came in for a blood pressure check which she had checked an Jayson tone in his blood pressure was 170/100, but currently his blood pressure is 130/88  Patient is a current smoker of 5 cigarettes per day  Patient has not been taking any medications because he has no insurance  Review of Systems   Constitutional: Positive for activity change, fatigue and unexpected weight change  Negative for chills and fever  HENT: Negative for congestion, ear pain, sore throat and tinnitus  Eyes: Negative for visual disturbance  Respiratory: Negative for cough, chest tightness, shortness of breath and wheezing  Cardiovascular: Negative for chest pain, palpitations and leg swelling  Gastrointestinal: Negative for abdominal pain, constipation, diarrhea, nausea and vomiting  Genitourinary: Negative for dysuria and frequency  Musculoskeletal: Positive for arthralgias  Skin: Negative for rash  Neurological: Negative for dizziness, weakness, light-headedness, numbness and headaches  Psychiatric/Behavioral: Negative for self-injury and suicidal ideas  OBJECTIVE:  Vitals:    08/16/18 1243   BP: 130/88   BP Location: Right arm   Patient Position: Sitting   Cuff Size: Adult   Pulse: 92   Temp: 98 4 °F (36 9 °C)   TempSrc: Oral   Weight: 72 8 kg (160 lb 7 9 oz)   Height: 5' 10 75" (1 797 m)     Physical Exam   Constitutional: He is oriented to person, place, and time  He appears well-developed and well-nourished  No distress  HENT:   Head: Normocephalic and atraumatic  Eyes: Conjunctivae are normal  Scleral icterus is present     Cardiovascular: Normal rate, regular rhythm and normal heart sounds  Exam reveals no gallop and no friction rub  No murmur heard  Pulmonary/Chest: Effort normal and breath sounds normal  No respiratory distress  He has no wheezes  He has no rales  Abdominal: Soft  Bowel sounds are normal  He exhibits no distension  There is no tenderness  Musculoskeletal: Normal range of motion  He exhibits no edema  Epicondylitis of the elbows bilaterally  brace over left knee    Neurological: He is alert and oriented to person, place, and time  No cranial nerve deficit  Skin: Skin is warm  No rash noted  Nursing note and vitals reviewed  Current Outpatient Prescriptions:     doxylamine (UNISON) 25 MG tablet, Take 0 5 tablets (12 5 mg total) by mouth daily at bedtime as needed for sleep for up to 14 days, Disp: 14 tablet, Rfl: 0    Elastic Bandages & Supports ([address] [place]) MIS, by Does not apply route as needed (elbow pain), Disp: 2 each, Rfl: 0    nadolol (CORGARD) 20 mg tablet, Take 0 5 tablets (10 mg total) by mouth daily (Patient not taking: Reported on 8/16/2018 ), Disp: 90 tablet, Rfl: 0    nicotine (NICODERM CQ) 14 mg/24hr TD 24 hr patch, Place 1 patch on the skin daily (Patient not taking: Reported on 8/16/2018 ), Disp: 28 patch, Rfl: 3    pantoprazole (PROTONIX) 40 mg tablet, Take 1 tablet (40 mg total) by mouth 2 (two) times a day before meals (Patient not taking: Reported on 8/16/2018 ), Disp: 90 tablet, Rfl: 0    Past Medical History:   Diagnosis Date    Back pain, chronic     Cirrhosis ([facility] [address] )     Hepatitis     C    Lyme disease      Past Surgical History:   Procedure Laterality Date    ESOPHAGOGASTRODUODENOSCOPY N/A 3/8/2018    Procedure: ESOPHAGOGASTRODUODENOSCOPY (EGD); Surgeon: Rip Irving MD;  Location: [facility];   Service: Gastroenterology    WRIST SURGERY Right     8/8/88     Social History     Social History    Marital status: Single     Spouse name: N/A    Number of children: N/A    Years of education: N/A Occupational History    Not on file  Social History Main Topics    Smoking status: Current Every Day Smoker     Packs/day: 0 25     Years: 36 00    Smokeless tobacco: Never Used    Alcohol use No      Comment: stopped drinking about a week ago    Drug use: No      Comment: Hx of drug abuse    Sexual activity: Not Currently     Other Topics Concern    Not on file     Social History Narrative    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

## 2018-08-16 NOTE — ASSESSMENT & PLAN NOTE
· Referral to Gastroenterology  · Nadolol and Protonix previously prescribed, but patient was unable to fill prescription due to no insurance   · CMP  · Continue to monitor

## 2018-08-31 ENCOUNTER — HOSPITAL ENCOUNTER (EMERGENCY)
Facility: HOSPITAL | Age: 58
Discharge: HOME | End: 2018-08-31
Attending: EMERGENCY MEDICINE | Admitting: EMERGENCY MEDICINE
Payer: COMMERCIAL

## 2018-08-31 VITALS
OXYGEN SATURATION: 98 % | BODY MASS INDEX: 22.47 KG/M2 | SYSTOLIC BLOOD PRESSURE: 147 MMHG | DIASTOLIC BLOOD PRESSURE: 83 MMHG | RESPIRATION RATE: 16 BRPM | HEART RATE: 93 BPM | TEMPERATURE: 98 F | WEIGHT: 160 LBS

## 2018-08-31 DIAGNOSIS — M71.22 BAKER'S CYST OF KNEE, LEFT: Primary | ICD-10-CM

## 2018-08-31 PROCEDURE — 99283 EMERGENCY DEPT VISIT LOW MDM: CPT

## 2018-08-31 RX ORDER — LIDOCAINE HYDROCHLORIDE 10 MG/ML
5 INJECTION, SOLUTION EPIDURAL; INFILTRATION; INTRACAUDAL; PERINEURAL ONCE
Status: COMPLETED | OUTPATIENT
Start: 2018-08-31 | End: 2018-08-31

## 2018-08-31 RX ORDER — NAPROXEN 375 MG/1
375 TABLET ORAL 2 TIMES DAILY WITH MEALS
Qty: 20 TABLET | Refills: 0 | Status: SHIPPED | OUTPATIENT
Start: 2018-08-31 | End: 2018-08-31

## 2018-08-31 RX ADMIN — LIDOCAINE HYDROCHLORIDE 5 ML: 10 INJECTION, SOLUTION EPIDURAL; INFILTRATION; INTRACAUDAL; PERINEURAL at 01:38

## 2018-08-31 NOTE — DISCHARGE INSTRUCTIONS
Bakers Cyst   WHAT YOU NEED TO KNOW:   A Bakers cyst, or popliteal cyst, is a bulging lump behind your knee  Inside the lump is a sac filled with fluid  The cyst is caused by fluid buildup in your knee joint  This can happen if you have a knee injury, such as a cartilage tear  Osteoarthritis or rheumatoid arthritis can also cause an abnormal buildup of joint fluid  DISCHARGE INSTRUCTIONS:   Seek care immediately if:   · You have severe pain  · You have bruising on the ankle below the cyst     · Your calf turns blue below the cyst     · Your calf or knee is swollen or bleeding  Contact your healthcare provider if:   · You have a fever  · Your pain does not improve with medicine  · You have questions or concerns about your condition or care  Medicines:   · NSAIDs  help decrease swelling and pain  This medicine is available without a doctor's order  Your healthcare provider will tell you which medicine to take and how often to take it  Follow directions  NSAIDs can cause stomach bleeding or kidney problems if they are not taken correctly  · Take your medicine as directed  Contact your healthcare provider if you think your medicine is not helping or if you have side effects  Tell him or her if you are allergic to any medicine  Keep a list of the medicines, vitamins, and herbs you take  Include the amounts, and when and why you take them  Bring the list or the pill bottles to follow-up visits  Carry your medicine list with you in case of an emergency  Care for your knee:   · Rest as needed  Limit movement as your knee heals  This will help decrease the risk of more damage to your knee  You may need crutches to take weight off your injured knee  Use crutches as directed  · Ice your knee  Ice helps decrease swelling and pain  Use an ice pack, or put ice in a plastic bag  Cover the ice pack with a towel and place the ice on your knee for 15 to 20 minutes, 3 to 4 times each day   Do this for 2 to 3 days     · Support your knee  Wrap your knee with an elastic bandage  Ask your healthcare provider if you need a brace for more support  This will help decrease swelling and movement so your knee can heal     · Elevate your knee  Use pillows to raise your knee above the level of your heart as often as you can  This will help decrease swelling  · Go to physical therapy as directed  A physical therapist teaches you exercises to help improve movement and strength, and to decrease pain  Follow up with your healthcare provider as directed:  Write down your questions so you remember to ask them during your visits  © 2017 [facility] Information is for End User's use only and may not be sold, redistributed or otherwise used for commercial purposes  All illustrations and images included in CareNotes® are the copyrighted property of [employer]  or Stanford Eastman  The above information is an  only  It is not intended as medical advice for individual conditions or treatments  Talk to your doctor, nurse or pharmacist before following any medical regimen to see if it is safe and effective for you

## 2018-08-31 NOTE — ED ATTENDING ATTESTATION
Carolee Riggs DO, saw and evaluated the patient  I have discussed the patient with the resident/non-physician practitioner and agree with the resident's/non-physician practitioner's findings, Plan of Care, and MDM as documented in the resident's/non-physician practitioner's note, except where noted  All available labs and Radiology studies were reviewed  At this point I agree with the current assessment done in the Emergency Department  I have conducted an independent evaluation of this patient a history and physical is as follows:      64 yo male c/o L knee pain for many months, was at [employer] today when he stood up and had worsening pain causing him to fall to the left  The other day he had a similar incident in which he fell to the right  Denies falling onto the knee  Denies prior injury, surgery related to the L knee  Denies f/c, focal weakness/numbness/tingling  Palpable mass over medial aspect of popliteal fossa, TTP  No skin changes over site  Bedside u/s confirms fluid filled cystic structure without flow c/w baker cyst    Imp: L knee pain, likely due to bakers cyst plan: aspirate, f/u ortho for definitive tx        Critical Care Time  CritCare Time    Procedures

## 2018-08-31 NOTE — ED PROVIDER NOTES
History  Chief Complaint   Patient presents with    Knee Pain     per patient, "my left knee is hurting real bad  i have a brace on it  i just hobbled all the way here from [place] " denies recent injury  This is a 52-year-old male with past medical history of cirrhosis, hepatitis-C, and gastric ulcers who presents to the emergency department this morning with left knee pain  Patient states that he has been having this knee pain for the past couple months but for the past couple days he feels like his left knee is becoming weaker and unable to hold his weight  Patient also notes a mass and the back of his left knee that is very firm and causing him some pain  Patient has not taken any medications for this  He states that he was at the still [employer] tonight listening to some live music and when he got up from sitting he fell over to the left side not striking his head or losing consciousness  This is what made him come to the emergency department tonight patient states that he walked over a mile  to get to the hospital   Patient denies any previous trauma, injuries, surgeries, or retained hardware in that knee  None       Past Medical History:   Diagnosis Date    Back pain, chronic     Cirrhosis ([address] )     Hepatitis     C    Lyme disease        Past Surgical History:   Procedure Laterality Date    ESOPHAGOGASTRODUODENOSCOPY N/A 3/8/2018    Procedure: ESOPHAGOGASTRODUODENOSCOPY (EGD); Surgeon: Opal Cooks, MD;  Location: [facility]; Service: Gastroenterology    WRIST SURGERY Right     8/8/88       Family History   Problem Relation Age of Onset    Colon cancer Mother      I have reviewed and agree with the history as documented      Social History   Substance Use Topics    Smoking status: Current Every Day Smoker     Packs/day: 0 25     Years: 36 00     Types: Cigarettes    Smokeless tobacco: Never Used    Alcohol use Yes      Comment: two rum and cokes today        Review of Systems Constitutional: Negative for chills, diaphoresis and fever  HENT: Negative for congestion, rhinorrhea, sinus pressure and sore throat  Eyes: Negative for visual disturbance  Respiratory: Negative for cough, chest tightness and shortness of breath  Cardiovascular: Negative for chest pain  Gastrointestinal: Negative for abdominal pain, constipation, diarrhea, nausea and vomiting  Genitourinary: Negative for dysuria, frequency, hematuria and urgency  Musculoskeletal: Positive for arthralgias and joint swelling  Negative for myalgias  Skin: Negative for color change and rash  Neurological: Negative for dizziness, numbness and headaches  Physical Exam  ED Triage Vitals [08/31/18 0101]   Temperature Pulse Respirations Blood Pressure SpO2   98 °F (36 7 °C) 93 16 147/83 98 %      Temp Source Heart Rate Source Patient Position - Orthostatic VS BP Location FiO2 (%)   Oral Monitor Sitting Left arm --      Pain Score       8           Orthostatic Vital Signs  Vitals:    08/31/18 0101   BP: 147/83   Pulse: 93   Patient Position - Orthostatic VS: Sitting       Physical Exam   Constitutional: He is oriented to person, place, and time  He appears well-developed and well-nourished  No distress  HENT:   Head: Normocephalic and atraumatic  Eyes: Conjunctivae are normal  Pupils are equal, round, and reactive to light  Neck: Normal range of motion  Neck supple  No JVD present  Cardiovascular: Normal rate, regular rhythm and normal heart sounds  Exam reveals no gallop and no friction rub  No murmur heard  Pulmonary/Chest: Effort normal and breath sounds normal  No stridor  No respiratory distress  He has no wheezes  He has no rales  Abdominal: Soft  Bowel sounds are normal  He exhibits no distension  There is no tenderness  There is no guarding  Musculoskeletal: Normal range of motion  He exhibits tenderness (Tenderness to palpation of the patient's left patellar tendon  )   He exhibits no edema or deformity  Negative joint line tenderness to palpation, negative edema, ecchymosis, erythema  Negative patellar tenderness to palpation  Negative quadriceps tendon tenderness to palpation  Negative LCL/ACL/mcl/PCL laxity when compared to the right knee  Negative Florecita's test   Negative patellar grind  Patient has a firm mass on the posterior knee that is nonpulsatile and felt better with the knee in full extension  Neurological: He is alert and oriented to person, place, and time  No cranial nerve deficit or sensory deficit  He exhibits normal muscle tone  Skin: Skin is warm and dry  No rash noted  He is not diaphoretic  No erythema  No pallor  Psychiatric: He has a normal mood and affect  His behavior is normal    Nursing note and vitals reviewed        ED Medications  Medications   lidocaine (PF) (XYLOCAINE-MPF) 1 % injection 5 mL (5 mL Infiltration Given by Other 8/31/18 0138)       Diagnostic Studies  Results Reviewed     None                 No orders to display         Procedures  Arthrocentesis  Date/Time: 8/31/2018 2:09 AM  Performed by: Mateo Sanchez  Authorized by: Rukhsana JOHNSON     Location:  Bedside  Verbal consent obtained?: Yes    Consent given by:  Patient  Patient identity confirmed:  Verbally with patient  Indications:  Joint swelling, pain and therapeutic  Body area:  Knee  Joint:  Left knee  Joint:  Left knee  Joint:  Left knee  Joint:  Left knee  Joint:  Left knee  Joint:  Left knee  Joint:  Left knee  Joint:  Left knee  Joint:  Left knee  Joint:  Left knee  Joint:  Left knee  Joint:  Left knee  Joint:  Left knee  Joint:  Left knee  Joint:  Left knee  Joint:  Left knee  Joint:  Left knee  Joint:  Left knee  Joint:  Left knee  Joint:  Left knee  Joint:  Left knee  Joint:  Left knee  Joint:  Left knee  Joint:  Left knee  Joint:  Left knee  Joint:  Left knee  Joint:  Left knee  Local anesthesia used?: Yes    Local anesthetic:  Lidocaine 1% without epinephrine  Needle gauge: 21 gauge  Ultrasound guidance: Yes    Approach:  Posterior  Aspirate amount (ml):  20  Aspirate:  Blood-tinged and serous  Patient tolerance:  Patient tolerated the procedure well with no immediate complications          Phone Consults  ED Phone Contact    ED Course                               MDM  Number of Diagnoses or Management Options  Baker's cyst of knee, left:   Diagnosis management comments: Patient with a left knee Baker cyst that was drained under ultrasound guidance here in the emergency department according to the procedure note  Patient tolerated the procedure well and was discharged home in good condition with instructions to follow up with his primary care physician  He was initially prescribed naproxen for the pain but this was discontinued when the patient revealed that he had a history of gastric ulcers  Patient unable to take acetaminophen due to his history of cirrhosis  Patient instructed to return to the emergency department should he develop any new or concerning symptoms  CritCare Time    Disposition  Final diagnoses:   Baker's cyst of knee, left     Time reflects when diagnosis was documented in both MDM as applicable and the Disposition within this note     Time User Action Codes Description Comment    8/31/2018  1:59 AM Al Quintero Add [M71 22] Baker's cyst of knee, left       ED Disposition     ED Disposition Condition Comment    Discharge  [place] discharge to home/self care      Condition at discharge: Good        Follow-up Information     Follow up With Specialties Details Why Contact Info    Jessie Gonzalez MD Internal Medicine   [address]  [phone]            Discharge Medication List as of 8/31/2018  2:00 AM      START taking these medications    Details   naproxen (NAPROSYN) 375 mg tablet Take 1 tablet (375 mg total) by mouth 2 (two) times a day with meals, Starting Fri 8/31/2018, Print           No discharge procedures on file     ED Provider  Attending physically available and evaluated Austin Person  HERNANDEZ managed the patient along with the ED Attending      Electronically Signed by         Maria Alejandra River MD  08/31/18 0611

## 2018-09-18 ENCOUNTER — HOSPITAL ENCOUNTER (EMERGENCY)
Facility: HOSPITAL | Age: 58
Discharge: HOME | End: 2018-09-19
Attending: EMERGENCY MEDICINE
Payer: COMMERCIAL

## 2018-09-18 VITALS
TEMPERATURE: 97 F | WEIGHT: 160.05 LBS | DIASTOLIC BLOOD PRESSURE: 89 MMHG | OXYGEN SATURATION: 99 % | RESPIRATION RATE: 18 BRPM | BODY MASS INDEX: 22.48 KG/M2 | SYSTOLIC BLOOD PRESSURE: 159 MMHG | HEART RATE: 60 BPM

## 2018-09-18 DIAGNOSIS — M25.562 LEFT KNEE PAIN: ICD-10-CM

## 2018-09-18 DIAGNOSIS — M71.22 BAKER CYST, LEFT: Primary | ICD-10-CM

## 2018-09-19 PROCEDURE — 99283 EMERGENCY DEPT VISIT LOW MDM: CPT

## 2018-09-19 NOTE — ED NOTES
Left knee warm to touch normal skin color  Pt states he has a cyst behind knee that was drained a few weeks ago and needs to be drained again        Madeleine Mccrary RN  09/18/18 3471

## 2018-09-19 NOTE — ED PROVIDER NOTES
History  Chief Complaint   Patient presents with    Knee Swelling     Pt states his left knee is swelling and needs to be drained  Pt got knee drained 2 weeks ago  63year old male with pmhx of hep C cirrhosis, esophageal varices, and chronic L knee pain since May 2018 now presenting with recurrent L knee pain  Patient was seen 2 weeks ago in ED where he was diagnosed with a baker's cyst which was drained with US guidance at bedside  Patient has not followed up with PCP since then  Patient states pain was initially better on previous drainage but has since worsened again  He is homeless and states he does a lot of walking  Patient denies history of trauma or surgery to L leg  Denies history of RA  Also denies systemic symptoms including fever/chills/nausea/vomiting  None       Past Medical History:   Diagnosis Date    Back pain, chronic     Cirrhosis ([address] )     Hepatitis     C    Lyme disease        Past Surgical History:   Procedure Laterality Date    ESOPHAGOGASTRODUODENOSCOPY N/A 3/8/2018    Procedure: ESOPHAGOGASTRODUODENOSCOPY (EGD); Surgeon: Branden Vallejo MD;  [facility]; Service: Gastroenterology    WRIST SURGERY Right     8/8/88       Family History   Problem Relation Age of Onset    Colon cancer Mother      I have reviewed and agree with the history as documented  Social History   Substance Use Topics    Smoking status: Current Every Day Smoker     Packs/day: 0 25     Years: 36 00     Types: Cigarettes    Smokeless tobacco: Never Used    Alcohol use Yes      Comment: two rum and cokes today        Review of Systems   Constitutional: Negative for chills and fever  HENT: Negative for congestion, rhinorrhea and sore throat  Eyes: Negative for photophobia and visual disturbance  Respiratory: Negative for cough, chest tightness and shortness of breath  Cardiovascular: Negative for chest pain and palpitations     Gastrointestinal: Negative for abdominal pain, blood in stool, constipation, diarrhea, nausea and vomiting  Endocrine: Negative for polyuria  Genitourinary: Negative for dysuria and hematuria  Musculoskeletal: Positive for arthralgias and joint swelling  Negative for neck pain and neck stiffness  Skin: Negative for rash and wound  Allergic/Immunologic: Negative for environmental allergies and food allergies  Neurological: Negative for dizziness, syncope, weakness and numbness  Psychiatric/Behavioral: Negative for agitation, behavioral problems and confusion  Physical Exam  ED Triage Vitals   Temperature Pulse Respirations Blood Pressure SpO2   09/18/18 2151 09/18/18 2151 09/18/18 2151 09/18/18 2151 09/18/18 2151   (!) 97 °F (36 1 °C) 86 18 (!) 181/89 100 %      Temp Source Heart Rate Source Patient Position - Orthostatic VS BP Location FiO2 (%)   09/18/18 2151 09/18/18 2326 09/18/18 2326 09/18/18 2326 --   Tympanic Monitor Sitting Right arm       Pain Score       09/18/18 2151       Worst Possible Pain           Orthostatic Vital Signs  Vitals:    09/18/18 2151 09/18/18 2326   BP: (!) 181/89 159/89   Pulse: 86 60   Patient Position - Orthostatic VS:  Sitting       Physical Exam   Constitutional: He is oriented to person, place, and time  He appears well-developed  No distress  HENT:   Head: Normocephalic and atraumatic  Right Ear: External ear normal    Left Ear: External ear normal    Nose: Nose normal    Eyes: Conjunctivae and EOM are normal  Pupils are equal, round, and reactive to light  Right eye exhibits no discharge  Left eye exhibits no discharge  Neck: Normal range of motion  Neck supple  Cardiovascular: Normal rate, regular rhythm, normal heart sounds and intact distal pulses  Exam reveals no gallop and no friction rub  No murmur heard  Pulmonary/Chest: Effort normal and breath sounds normal  No stridor  No respiratory distress  He has no wheezes  He has no rales  He exhibits no tenderness  Abdominal: Soft   Bowel sounds are normal  He exhibits no distension  There is no tenderness  There is no rebound and no guarding  Musculoskeletal: Normal range of motion  He exhibits no edema, tenderness or deformity  L Knee effusion, no erythema, tender 4 cm nonpulsatile mass in popliteal fossa, no erythema no bony tenderness, no crepitus or deformity  FROM, No ACL/PCL/MCL/LCL laxity, negative carlos test   Left calf nontender to palpation with no swelling or erythema, distal left lower extremity nontender to palpation with no swelling or erythema, full range of motion of left ankle and at    R knee shows no swelling or erythema, FROM, NTTP   Neurological: He is alert and oriented to person, place, and time  Skin: Skin is warm and dry  Capillary refill takes less than 2 seconds  No rash noted  He is not diaphoretic  Psychiatric: He has a normal mood and affect  His behavior is normal    Nursing note and vitals reviewed  ED Medications  Medications - No data to display    Diagnostic Studies  Results Reviewed     None                 No orders to display         Procedures  Procedures      Phone Consults  ED Phone Contact    ED Course                               MDM  Number of Diagnoses or Management Options  Baker cyst, left:   Left knee pain:   Diagnosis management comments: 63year old male presenting with recurrent L knee pain  Patient was diagnosed with bakers cyst on US two weeks ago and was drained by ultrasound guidance  Presentation is similar to last time and palpation of tender mass in the posterior popliteal fossa likely recurrence of cyst  Given patient had it drained only two weeks ago, repeating drainage at this time seems unlikely to offer long term solution  Will have patient follow up as outpatient for further management  Reassuring that patient is afebrile and based on exam this is unlikely to be a septic joint  Reassuring that patient can ambulate on L leg   Patient poor candidate for systemic NSAIDs due to risk of GI bleed  Will provide rx for topical voltaren for pain to bridge patient until outpatient appointment  CritCare Time    Disposition  Final diagnoses:   Left knee pain   Baker cyst, left     Time reflects when diagnosis was documented in both MDM as applicable and the Disposition within this note     Time User Action Codes Description Comment    9/18/2018 11:58 PM Eliz Adrian Add [M25 562] Left knee pain     9/18/2018 11:58 PM Gaurav Adrian Add [M71 22] Baker cyst, left     9/18/2018 11:59 PM Eliz Adrian Modify [N71 966] Left knee pain     9/18/2018 11:59 PM Gaurav Adrian Modify [M71 22] Baker cyst, left       ED Disposition     ED Disposition Condition Comment    Discharge  [place] discharge to home/self care  Condition at discharge: Good        Follow-up Information     Follow up With Specialties Details Why [id number] Emiliano MD Internal Medicine Schedule an appointment as soon as possible for a visit Chronic L knee pain [address] [place] [address]  [phone]             Discharge Medication List as of 9/19/2018 12:01 AM      START taking these medications    Details   diclofenac sodium (VOLTAREN) 1 % Apply 2 g topically 4 (four) times a day, Starting Tue 9/18/2018, Print           No discharge procedures on file  ED Provider  Attending physically available and evaluated [place] I managed the patient along with the ED Attending      Electronically Signed by         Caleb Wilkes MD  09/20/18 2829       Caleb Wilkes MD  09/20/18 0862

## 2018-09-19 NOTE — DISCHARGE INSTRUCTIONS
Bakers Cyst   WHAT YOU NEED TO KNOW:   A Bakers cyst, or popliteal cyst, is a bulging lump behind your knee  Inside the lump is a sac filled with fluid  The cyst is caused by fluid buildup in your knee joint  This can happen if you have a knee injury, such as a cartilage tear  Osteoarthritis or rheumatoid arthritis can also cause an abnormal buildup of joint fluid  DISCHARGE INSTRUCTIONS:   Return to the emergency department if:   · You have severe pain  · You have bruising on the ankle below the cyst     · Your calf turns blue below the cyst     · Your calf or knee is swollen or bleeding  Contact your healthcare provider if:   · You have a fever  · Your pain does not improve with medicine  · You have questions or concerns about your condition or care  Medicines:   · NSAIDs  help decrease swelling and pain  This medicine is available without a doctor's order  Your healthcare provider will tell you which medicine to take and how often to take it  Follow directions  NSAIDs can cause stomach bleeding or kidney problems if they are not taken correctly  · Take your medicine as directed  Contact your healthcare provider if you think your medicine is not helping or if you have side effects  Tell him of her if you are allergic to any medicine  Keep a list of the medicines, vitamins, and herbs you take  Include the amounts, and when and why you take them  Bring the list or the pill bottles to follow-up visits  Carry your medicine list with you in case of an emergency  Care for your knee:   · Rest as needed  Limit movement as your knee heals  This will help decrease the risk of more damage to your knee  You may need crutches to take weight off your injured knee  Use crutches as directed  · Ice your knee  Ice helps decrease swelling and pain  Use an ice pack, or put ice in a plastic bag  Cover the ice pack with a towel and place the ice on your knee for 15 to 20 minutes, 3 to 4 times each day   Do this for 2 to 3 days  · Support your knee  Wrap your knee with an elastic bandage  Ask your healthcare provider if you need a brace for more support  This will help decrease swelling and movement so your knee can heal     · Elevate your knee  Use pillows to raise your knee above the level of your heart as often as you can  This will help decrease swelling  · Go to physical therapy as directed  A physical therapist teaches you exercises to help improve movement and strength, and to decrease pain  Follow up with your healthcare provider as directed:  Write down your questions so you remember to ask them during your visits  © 2017 [facility] Information is for End User's use only and may not be sold, redistributed or otherwise used for commercial purposes  All illustrations and images included in CareNotes® are the copyrighted property of [employer]  or [employer]  The above information is an  only  It is not intended as medical advice for individual conditions or treatments  Talk to your doctor, nurse or pharmacist before following any medical regimen to see if it is safe and effective for you

## 2018-09-19 NOTE — ED ATTENDING ATTESTATION
Juve Davidson MD, saw and evaluated the patient  I have discussed the patient with the resident/non-physician practitioner and agree with the resident's/non-physician practitioner's findings, Plan of Care, and MDM as documented in the resident's/non-physician practitioner's note, except where noted  All available labs and Radiology studies were reviewed  At this point I agree with the current assessment done in the Emergency Department  I have conducted an independent evaluation of this patient a history and physical is as follows:      Critical Care Time  CritCare Time    Procedures     Patient with knee swelling since May  Homeless, notes some worsening edema for the past several weeks  Recent diagnosis of baker cyst  Now here with continued swelling  No f/c/s/n/v/d  No cp or sob  No abdominal pain  No dysuria, hematuria      + ROM, no redness, palpable firm mass in posterior fossa, non pulsatile, no redness or warmth  No signs of infection  MDM well appearing 63year old male, baker cyst, will refer to ortho  No history of dvt or PE, no new lower extremity edema, only the knee  The patient (and any family present) verbalized understanding of the discharge instructions and warnings that would necessitate return to the Emergency Department  Gave verbal in addition to written discharge instructions  Specifically highlighted areas of special concern regarding the written and verbal discharge instructions and return precautions  All questions were answered prior to discharge

## 2019-07-28 ENCOUNTER — APPOINTMENT (EMERGENCY)
Dept: RADIOLOGY | Facility: HOSPITAL | Age: 59
End: 2019-07-28
Payer: COMMERCIAL

## 2019-07-28 ENCOUNTER — HOSPITAL ENCOUNTER (EMERGENCY)
Facility: HOSPITAL | Age: 59
Discharge: HOME | End: 2019-07-28
Attending: EMERGENCY MEDICINE
Payer: COMMERCIAL

## 2019-07-28 VITALS
SYSTOLIC BLOOD PRESSURE: 143 MMHG | HEART RATE: 69 BPM | BODY MASS INDEX: 21.77 KG/M2 | WEIGHT: 155 LBS | DIASTOLIC BLOOD PRESSURE: 85 MMHG | RESPIRATION RATE: 20 BRPM | TEMPERATURE: 97.8 F | OXYGEN SATURATION: 98 %

## 2019-07-28 DIAGNOSIS — M25.561 RIGHT KNEE PAIN: Primary | ICD-10-CM

## 2019-07-28 DIAGNOSIS — M25.469 KNEE EFFUSION: ICD-10-CM

## 2019-07-28 LAB
ALBUMIN SERPL BCP-MCNC: 3 G/DL (ref 3.5–5)
ALP SERPL-CCNC: 106 U/L (ref 46–116)
ALT SERPL W P-5'-P-CCNC: 64 U/L (ref 12–78)
ANION GAP SERPL CALCULATED.3IONS-SCNC: 6 MMOL/L (ref 4–13)
AST SERPL W P-5'-P-CCNC: 100 U/L (ref 5–45)
BASOPHILS # BLD AUTO: 0.04 THOUSANDS/ΜL (ref 0–0.1)
BASOPHILS NFR BLD AUTO: 1 % (ref 0–1)
BILIRUB SERPL-MCNC: 0.69 MG/DL (ref 0.2–1)
BUN SERPL-MCNC: 11 MG/DL (ref 5–25)
CALCIUM SERPL-MCNC: 9.3 MG/DL (ref 8.3–10.1)
CHLORIDE SERPL-SCNC: 107 MMOL/L (ref 100–108)
CO2 SERPL-SCNC: 25 MMOL/L (ref 21–32)
CREAT SERPL-MCNC: 0.96 MG/DL (ref 0.6–1.3)
EOSINOPHIL # BLD AUTO: 0.18 THOUSAND/ΜL (ref 0–0.61)
EOSINOPHIL NFR BLD AUTO: 4 % (ref 0–6)
ERYTHROCYTE [DISTWIDTH] IN BLOOD BY AUTOMATED COUNT: 20.3 % (ref 11.6–15.1)
GFR SERPL CREATININE-BSD FRML MDRD: 87 ML/MIN/1.73SQ M
GLUCOSE SERPL-MCNC: 103 MG/DL (ref 65–140)
HCT VFR BLD AUTO: 33 % (ref 36.5–49.3)
HGB BLD-MCNC: 10.2 G/DL (ref 12–17)
IMM GRANULOCYTES # BLD AUTO: 0.01 THOUSAND/UL (ref 0–0.2)
IMM GRANULOCYTES NFR BLD AUTO: 0 % (ref 0–2)
LACTATE SERPL-SCNC: 1.9 MMOL/L (ref 0.5–2)
LYMPHOCYTES # BLD AUTO: 1.88 THOUSANDS/ΜL (ref 0.6–4.47)
LYMPHOCYTES NFR BLD AUTO: 37 % (ref 14–44)
MCH RBC QN AUTO: 25 PG (ref 26.8–34.3)
MCHC RBC AUTO-ENTMCNC: 30.9 G/DL (ref 31.4–37.4)
MCV RBC AUTO: 81 FL (ref 82–98)
MONOCYTES # BLD AUTO: 0.86 THOUSAND/ΜL (ref 0.17–1.22)
MONOCYTES NFR BLD AUTO: 17 % (ref 4–12)
NEUTROPHILS # BLD AUTO: 2.17 THOUSANDS/ΜL (ref 1.85–7.62)
NEUTS SEG NFR BLD AUTO: 41 % (ref 43–75)
NRBC BLD AUTO-RTO: 0 /100 WBCS
PLATELET # BLD AUTO: 130 THOUSANDS/UL (ref 149–390)
PMV BLD AUTO: 9.9 FL (ref 8.9–12.7)
POTASSIUM SERPL-SCNC: 3.6 MMOL/L (ref 3.5–5.3)
PROT SERPL-MCNC: 7.8 G/DL (ref 6.4–8.2)
RBC # BLD AUTO: 4.08 MILLION/UL (ref 3.88–5.62)
SODIUM SERPL-SCNC: 138 MMOL/L (ref 136–145)
WBC # BLD AUTO: 5.14 THOUSAND/UL (ref 4.31–10.16)

## 2019-07-28 PROCEDURE — 87040 BLOOD CULTURE FOR BACTERIA: CPT | Performed by: EMERGENCY MEDICINE

## 2019-07-28 PROCEDURE — 99284 EMERGENCY DEPT VISIT MOD MDM: CPT

## 2019-07-28 PROCEDURE — 80053 COMPREHEN METABOLIC PANEL: CPT | Performed by: EMERGENCY MEDICINE

## 2019-07-28 PROCEDURE — 99283 EMERGENCY DEPT VISIT LOW MDM: CPT | Performed by: EMERGENCY MEDICINE

## 2019-07-28 PROCEDURE — 96360 HYDRATION IV INFUSION INIT: CPT

## 2019-07-28 PROCEDURE — 85025 COMPLETE CBC W/AUTO DIFF WBC: CPT | Performed by: EMERGENCY MEDICINE

## 2019-07-28 PROCEDURE — 36415 COLL VENOUS BLD VENIPUNCTURE: CPT | Performed by: EMERGENCY MEDICINE

## 2019-07-28 PROCEDURE — 20610 DRAIN/INJ JOINT/BURSA W/O US: CPT | Performed by: EMERGENCY MEDICINE

## 2019-07-28 PROCEDURE — 71046 X-RAY EXAM CHEST 2 VIEWS: CPT

## 2019-07-28 PROCEDURE — 83605 ASSAY OF LACTIC ACID: CPT | Performed by: EMERGENCY MEDICINE

## 2019-07-28 PROCEDURE — 73560 X-RAY EXAM OF KNEE 1 OR 2: CPT

## 2019-07-28 RX ORDER — BUPIVACAINE HYDROCHLORIDE 5 MG/ML
10 INJECTION, SOLUTION EPIDURAL; INTRACAUDAL ONCE
Status: COMPLETED | OUTPATIENT
Start: 2019-07-28 | End: 2019-07-28

## 2019-07-28 RX ADMIN — SODIUM CHLORIDE 1000 ML: 0.9 INJECTION, SOLUTION INTRAVENOUS at 21:14

## 2019-07-28 RX ADMIN — BUPIVACAINE HYDROCHLORIDE 10 ML: 5 INJECTION, SOLUTION EPIDURAL; INTRACAUDAL; PERINEURAL at 21:14

## 2019-07-28 NOTE — ED ATTENDING ATTESTATION
Denita Crocker DO, saw and evaluated the patient  I have discussed the patient with the resident/non-physician practitioner and agree with the resident's/non-physician practitioner's findings, Plan of Care, and MDM as documented in the resident's/non-physician practitioner's note, except where noted  All available labs and Radiology studies were reviewed  At this point I agree with the current assessment done in the Emergency Department  I have conducted an independent evaluation of this patient including a focused history and a physical exam     ED Note - Lia Pro 63 y o  male MRN: [id number]  Unit/Bed#: ED 01 Encounter: [id number]    History of Present Illness   HPI  Lia Pro is a 63 y o  male who presents for evaluation of left knee pain  Patient has chronic left knee pain for many months and states today while he was helping a friend put a basketball net together and bending on his knee, the pain became exacerbated  Patient states he is having difficulty bearing weight on his knee  Minimal swelling  No erythema or warmth  No fever or chills  Patient voices no other complaints  REVIEW OF SYSTEMS  See HPI for further details  12 systems reviewed and otherwise negative except as noted     Historical Information     PAST MEDICAL HISTORY  Past Medical History:   Diagnosis Date    Back pain, chronic     Cirrhosis (HCC)     Hepatitis     C    Lyme disease        FAMILY HISTORY  Family History   Problem Relation Age of Onset    Colon cancer Mother        SOCIAL HISTORY  Social History     Socioeconomic History    Marital status: Single     Spouse name: None    Number of children: None    Years of education: None    Highest education level: None   Occupational History    None   Social Needs    Financial resource strain: None    Food insecurity:     Worry: None     Inability: None    Transportation needs:     Medical: None     Non-medical: None   Tobacco Use    Smoking status: Current Every Day Smoker     Packs/day: 0 25     Years: 36 00     Pack years: 9 00     Types: Cigarettes    Smokeless tobacco: Never Used   Substance and Sexual Activity    Alcohol use: Yes     Comment: two rum and cokes today    Drug use: No     Comment: Hx of drug abuse    Sexual activity: Not Currently   Lifestyle    Physical activity:     Days per week: None     Minutes per session: None    Stress: None   Relationships    Social connections:     Talks on phone: None     Gets together: None     Attends Faith service: None     Active member of club or organization: None     Attends meetings of clubs or organizations: None     Relationship status: None    Intimate partner violence:     Fear of current or ex partner: None     Emotionally abused: None     Physically abused: None     Forced sexual activity: None   Other Topics Concern    None   Social History Narrative    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  SURGICAL HISTORY  Past Surgical History:   Procedure Laterality Date    ESOPHAGOGASTRODUODENOSCOPY N/A 3/8/2018    Procedure: ESOPHAGOGASTRODUODENOSCOPY (EGD); Surgeon: Matthew Lopez MD;  Location: [facility]; Service: Gastroenterology    WRIST SURGERY Right     8/8/88     Meds/Allergies     CURRENT MEDICATIONS  No current facility-administered medications for this encounter  Current Outpatient Medications:     diclofenac sodium (VOLTAREN) 1 %, Apply 2 g topically 4 (four) times a day (Patient not taking: Reported on 7/28/2019), Disp: 100 g, Rfl: 1    (Not in a hospital admission)    ALLERGIES  Allergies   Allergen Reactions    Codeine      Objective     PHYSICAL EXAM    VITAL SIGNS: Blood pressure (!) 175/103, pulse 105, temperature (!) 100 9 °F (38 3 °C), resp  rate 20, weight 70 3 kg (155 lb), SpO2 98 %      Constitutional:  Appears well developed and well nourished, no acute distress, non-toxic appearance   Eyes:  PERRL, EOMI, conjunctivae pink, sclerae non-icteric    HENT:  Normocephalic/Atraumatic, no rhinorrhea, mucous membranes moist   Neck: normal range of motion, no tenderness, supple   Respiratory:  No respiratory distress, normal breath sounds   Cardiovascular:  Normal rate, normal rhythm    GI:  Soft, non-tender, non-distended,  :  No CVAT, no flank ecchymosis  Musculoskeletal:  LEFT knee: minimal swelling/ effusion, +ve tenderness over LCL/ MCL regions, no laxity, no deformities  Integument:  Pink, warm, dry, Well hydrated, no rash, no erythema, no bullae   Lymphatic:  No cervical/ tonsillar/ submandibular lymphadenopathy noted   Neurologic:  Awake, Alert & oriented x 3, CN 2-12 intact, no focal neurological deficits  Psychiatric:  Speech and behavior appropriate       ED COURSE and MDM:    Assessment/Plan   Assessment:  Driss Vang is a 63 y o  male presents for evaluation of left knee pain  Plan:    Labs, symptom management, arthrocentesis as indicated, disposition as appropriate  CRITICAL CARE TIME: 0 minutes      Portions of the record may have been created with voice recognition software  Occasional wrong word or "sound a like" substitutions may have occurred due to the inherent limitations of voice recognition software       ED Provider  Electronically Signed by

## 2019-07-28 NOTE — ED NOTES
Dr Winnie Vera at the bedside for patient evaluation at this time        Shaunna Hines RN  07/28/19 9922

## 2019-07-29 NOTE — ED PROCEDURE NOTE
Procedure  Arthrocentesis  Date/Time: 7/28/2019 10:44 PM  Performed by: Gurpreet Ingram MD  Authorized by: Gurpreet Ingram MD     Location:  ED  Verbal consent obtained?: Yes    Consent given by:  Patient  Patient states understanding of procedure being performed: Yes    Patient identity confirmed:  Verbally with patient  Indications:  Joint swelling, pain and possible septic joint  Body area:  Knee  Joint:  Left knee  Joint:  Left knee  Joint:  Left knee  Joint:  Left knee  Joint:  Left knee  Joint:  Left knee  Joint:  Left knee  Joint:  Left knee  Joint:  Left knee  Joint:  Left knee  Joint:  Left knee  Joint:  Left knee  Joint:  Left knee  Joint:  Left knee  Joint:  Left knee  Joint:  Left knee  Joint:  Left knee  Joint:  Left knee  Joint:  Left knee  Joint:  Left knee  Joint:  Left knee  Joint:  Left knee  Joint:  Left knee  Joint:  Left knee  Joint:  Left knee  Joint:  Left knee  Joint:  Left knee  Local anesthesia used?: Yes    Local anesthetic:  Bupivacaine 0 5% without epinephrine  Needle size:  18 G  Approach:  Medial  Aspirate:  Blood-tinged and yellow  Bupivacaine 0 50% amount (ml):  5  Patient tolerance:  Patient tolerated the procedure well with no immediate complications                     Gurpreet Ingram MD  07/28/19 0050

## 2019-07-29 NOTE — ED PROVIDER NOTES
History  Chief Complaint   Patient presents with    Knee Pain     pt reports having cyst drained behind knee in january  States today he was working in yard and felt a pop in knee  PT complaining of 8/10 knee pain and "no strength"  HPI  Patient is a 60-year-old male past medical history hep C cirrhosis, bleeding varices, Lyme disease, chronic left knee pain Baker cyst presenting for evaluation of left knee pain  Patient states that he has had stable left knee pain for the last few weeks  Patient states that this is worse with ambulation gets worse to the course of the day  Patient states that her last 2 days he has had subjective chills, malaise  Patient states that today he was working outside on his friends when he felt a pop in his left knee and has pain worsened  Patient states that he has had 2 days of sensation of warmth in the affected knee  Patient has had difficulty weight-bearing today although he has been ambulating without assistance  Patient denies chest pain, shortness of breath, nausea, vomiting, abdominal pain  Prior to Admission Medications   Prescriptions Last Dose Informant Patient Reported? Taking?   diclofenac sodium (VOLTAREN) 1 % Not Taking at Unknown time  No No   Sig: Apply 2 g topically 4 (four) times a day   Patient not taking: Reported on 7/28/2019      Facility-Administered Medications: None       Past Medical History:   Diagnosis Date    Back pain, chronic     [facility]rhosis ([facility] [address] )     Hepatitis     C    Lyme disease        Past Surgical History:   Procedure Laterality Date    ESOPHAGOGASTRODUODENOSCOPY N/A 3/8/2018    Procedure: ESOPHAGOGASTRODUODENOSCOPY (EGD); Surgeon: Eze Sutton MD;  Location: [facility]; Service: Gastroenterology    WRIST SURGERY Right     8/8/88       Family History   Problem Relation Age of Onset    Colon cancer Mother      I have reviewed and agree with the history as documented      Social History     Tobacco Use    Smoking status: Current Every Day Smoker     Packs/day: 0 25     Years: 36 00     Pack years: 9 00     Types: Cigarettes    Smokeless tobacco: Never Used   Substance Use Topics    Alcohol use: Yes     Comment: two rum and cokes today    Drug use: No     Comment: Hx of drug abuse        Review of Systems   Constitutional: Positive for chills and fever  Negative for activity change, appetite change, diaphoresis and fatigue  HENT: Negative for sore throat  Eyes: Negative for photophobia and visual disturbance  Respiratory: Negative for cough, chest tightness, shortness of breath and wheezing  Cardiovascular: Negative for chest pain, palpitations and leg swelling  Gastrointestinal: Negative for abdominal distention, abdominal pain, constipation, diarrhea, nausea and vomiting  Genitourinary: Negative for difficulty urinating, dysuria, flank pain and hematuria  Musculoskeletal: Positive for arthralgias (left knee) and gait problem  Negative for back pain, joint swelling, myalgias, neck pain and neck stiffness  Skin: Negative for color change, pallor, rash and wound  Neurological: Negative for dizziness, seizures, syncope, facial asymmetry, weakness, light-headedness, numbness and headaches  Psychiatric/Behavioral: Negative for confusion         Physical Exam  ED Triage Vitals   Temperature Pulse Respirations Blood Pressure SpO2   07/28/19 1846 07/28/19 1846 07/28/19 1846 07/28/19 1846 07/28/19 1846   (!) 100 9 °F (38 3 °C) 105 20 (!) 175/103 98 %      Temp Source Heart Rate Source Patient Position - Orthostatic VS BP Location FiO2 (%)   07/28/19 2159 07/28/19 1846 07/28/19 1846 07/28/19 1846 --   Oral Monitor Sitting Right arm       Pain Score       07/28/19 1846       8             Orthostatic Vital Signs  Vitals:    07/28/19 1846 07/28/19 1945 07/28/19 2049   BP: (!) 175/103 169/97 143/85   Pulse: 105 76 69   Patient Position - Orthostatic VS: Sitting Lying Lying       Physical Exam   Constitutional: He is oriented to person, place, and time  He appears well-developed and well-nourished  No distress  HENT:   Head: Normocephalic and atraumatic  Right Ear: External ear normal    Left Ear: External ear normal    Nose: Nose normal    Mouth/Throat: Oropharynx is clear and moist  No oropharyngeal exudate  Eyes: Pupils are equal, round, and reactive to light  Conjunctivae are normal    Cardiovascular: Normal rate, regular rhythm, normal heart sounds and intact distal pulses  Exam reveals no gallop and no friction rub  No murmur heard  Pulmonary/Chest: Effort normal and breath sounds normal  No respiratory distress  He has no wheezes  He exhibits no tenderness  Abdominal: Soft  Bowel sounds are normal  He exhibits no distension and no mass  There is no tenderness  There is no rebound and no guarding  Musculoskeletal: He exhibits no edema or deformity  Patient has minor pain with passive range of motion but is able to fully range his left knee  Patient is able to weightbear    Neurological: He is alert and oriented to person, place, and time  Skin: Skin is warm and dry  Capillary refill takes less than 2 seconds  He is not diaphoretic  Psychiatric: He has a normal mood and affect  His behavior is normal    Nursing note and vitals reviewed  ED Medications  Medications   sodium chloride 0 9 % bolus 1,000 mL (0 mL Intravenous Stopped 7/28/19 2215)   bupivacaine (PF) (MARCAINE) 0 5 % injection 10 mL (10 mL Injection Given by Other 7/28/19 2114)       Diagnostic Studies  Results Reviewed     Procedure Component Value Units Date/Time    Lactic acid x2 Q2H [[id number]]  (Normal) Collected:  07/28/19 1937    Lab Status:  Final result Specimen:  Blood from Arm, Left Updated:  07/28/19 2013     LACTIC ACID 1 9 mmol/L     Narrative:       Result may be elevated if tourniquet was used during collection      Comprehensive metabolic panel [[id number]]  (Abnormal) Collected:  07/28/19 1937    Lab Status:  Final result Specimen: Blood from Arm, Left Updated:  07/28/19 2012     Sodium 138 mmol/L      Potassium 3 6 mmol/L      Chloride 107 mmol/L      CO2 25 mmol/L      ANION GAP 6 mmol/L      BUN 11 mg/dL      Creatinine 0 96 mg/dL      Glucose 103 mg/dL      Calcium 9 3 mg/dL       U/L      ALT 64 U/L      Alkaline Phosphatase 106 U/L      Total Protein 7 8 g/dL      Albumin 3 0 g/dL      Total Bilirubin 0 69 mg/dL      eGFR 87 ml/min/1 73sq m     Narrative:       National Kidney Disease Foundation guidelines for Chronic Kidney Disease (CKD):     Stage 1 with normal or high GFR (GFR > 90 mL/min/1 73 square meters)    Stage 2 Mild CKD (GFR = 60-89 mL/min/1 73 square meters)    Stage 3A Moderate CKD (GFR = 45-59 mL/min/1 73 square meters)    Stage 3B Moderate CKD (GFR = 30-44 mL/min/1 73 square meters)    Stage 4 Severe CKD (GFR = 15-29 mL/min/1 73 square meters)    Stage 5 End Stage CKD (GFR <15 mL/min/1 73 square meters)  Note: GFR calculation is accurate only with a steady state creatinine    CBC and differential [[id number]]  (Abnormal) Collected:  07/28/19 1937    Lab Status:  Final result Specimen:  Blood from Arm, Left Updated:  07/28/19 1952     WBC 5 14 Thousand/uL      RBC 4 08 Million/uL      Hemoglobin 10 2 g/dL      Hematocrit 33 0 %      MCV 81 fL      MCH 25 0 pg      MCHC 30 9 g/dL      RDW 20 3 %      MPV 9 9 fL      Platelets 516 Thousands/uL      nRBC 0 /100 WBCs      Neutrophils Relative 41 %      Immat GRANS % 0 %      Lymphocytes Relative 37 %      Monocytes Relative 17 %      Eosinophils Relative 4 %      Basophils Relative 1 %      Neutrophils Absolute 2 17 Thousands/µL      Immature Grans Absolute 0 01 Thousand/uL      Lymphocytes Absolute 1 88 Thousands/µL      Monocytes Absolute 0 86 Thousand/µL      Eosinophils Absolute 0 18 Thousand/µL      Basophils Absolute 0 04 Thousands/µL     Blood culture [[id number]] Collected:  07/28/19 1937    Lab Status:   In process Specimen:  Blood from Arm, Left Updated: 07/28/19 1949    Blood culture [[id number]] Collected:  07/28/19 1937    Lab Status: In process Specimen:  Blood from Arm, Left Updated:  07/28/19 1949    POCT urinalysis dipstick [[id number]]     Lab Status:  No result                  XR chest 2 views    (Results Pending)   XR knee 1 or 2 views left    (Results Pending)         Procedures  Procedures        ED Course                               MDM  Number of Diagnoses or Management Options  Knee effusion:   Right knee pain:   Diagnosis management comments: Patient is a 43-year-old male history of hepatitis-C presenting for evaluation of acute on chronic left knee pain, patient initially febrile but normalization of vital signs on re-examination  Patient well appearing, comfortable, patient with mild knee tenderness, mild pain on active and passive range of motion  Patient able weight bear  Small effusion, no significant erythema or warmth  Sepsis labs unremarkable  X-ray of left knee without demonstrated effusion or fracture  Knee arthrocentesis performed with aspiration of roughly 15 cc of straw-colored fluid  Injected joint with bupivacaine for symptomatic relief    Patient endorses significant relief of symptoms, able to ambulate, provided with crutches and discharged to home with instructions to follow up with Orthopedic surgery, may benefit from an MRI, given return precautions       Amount and/or Complexity of Data Reviewed  Clinical lab tests: ordered and reviewed  Tests in the radiology section of CPT®: ordered and reviewed  Tests in the medicine section of CPT®: ordered and reviewed  Decide to obtain previous medical records or to obtain history from someone other than the patient: yes  Review and summarize past medical records: yes  Independent visualization of images, tracings, or specimens: yes    Risk of Complications, Morbidity, and/or Mortality  Presenting problems: low  Diagnostic procedures: low  Management options: low    Patient Progress  Patient progress: improved      Disposition  Final diagnoses:   Right knee pain   Knee effusion     Time reflects when diagnosis was documented in both MDM as applicable and the Disposition within this note     Time User Action Codes Description Comment    7/28/2019 10:08 PM Saad Kinsey Add [M25 561] Right knee pain     7/28/2019 10:08 PM Saad Kinsey Add [Z95 [id number]] Knee effusion       ED Disposition     ED Disposition Condition Date/Time Comment    Discharge Stable Sun Jul 28, 2019 10:08 PM Hunter Michaud discharge to home/self care  Follow-up Information     Follow up With Specialties Details Why Contact Info Additional Information    [address] [facility] [place] [facility]   [address] [address]  [address], [address], [place], [address] [facility], [phone]          Discharge Medication List as of 7/28/2019 10:10 PM      CONTINUE these medications which have NOT CHANGED    Details   diclofenac sodium (VOLTAREN) 1 % Apply 2 g topically 4 (four) times a day, Starting Tue 9/18/2018, Print           No discharge procedures on file  ED Provider  Attending physically available and evaluated Hunter Michaud I managed the patient along with the ED Attending      Electronically Signed by         Elvia Echols MD  07/28/19 2436

## 2019-07-29 NOTE — DISCHARGE INSTRUCTIONS
Please call to schedule an appointment with Orthopedic surgery  They may recommend an MRI at that time  We have provided you with crutches to use to rest your knee  Please return to the emergency department if you are unable to bear weight on your knee, if you have worsening heat and redness of your knee or if you feel fever and chills

## 2019-08-01 ENCOUNTER — OFFICE VISIT (OUTPATIENT)
Dept: INTERNAL MEDICINE CLINIC | Facility: CLINIC | Age: 59
End: 2019-08-01

## 2019-08-01 VITALS
HEIGHT: 70 IN | TEMPERATURE: 98.3 F | BODY MASS INDEX: 23.73 KG/M2 | WEIGHT: 165.79 LBS | HEART RATE: 100 BPM | SYSTOLIC BLOOD PRESSURE: 130 MMHG | DIASTOLIC BLOOD PRESSURE: 90 MMHG

## 2019-08-01 DIAGNOSIS — Z23 NEED FOR PNEUMOCOCCAL VACCINATION: Primary | ICD-10-CM

## 2019-08-01 DIAGNOSIS — M25.562 ACUTE PAIN OF LEFT KNEE: ICD-10-CM

## 2019-08-01 DIAGNOSIS — B18.2 CHRONIC HEPATITIS C WITHOUT HEPATIC COMA (HCC): Chronic | ICD-10-CM

## 2019-08-01 DIAGNOSIS — K70.31 ALCOHOLIC CIRRHOSIS OF LIVER WITH ASCITES (HCC): Chronic | ICD-10-CM

## 2019-08-01 DIAGNOSIS — Z12.11 SCREENING FOR COLON CANCER: ICD-10-CM

## 2019-08-01 DIAGNOSIS — F17.210 CIGARETTE NICOTINE DEPENDENCE WITHOUT COMPLICATION: Chronic | ICD-10-CM

## 2019-08-01 PROBLEM — Z00.00 HEALTHCARE MAINTENANCE: Status: ACTIVE | Noted: 2019-08-01

## 2019-08-01 PROCEDURE — 3008F BODY MASS INDEX DOCD: CPT | Performed by: INTERNAL MEDICINE

## 2019-08-01 PROCEDURE — 99213 OFFICE O/P EST LOW 20 MIN: CPT | Performed by: INTERNAL MEDICINE

## 2019-08-01 NOTE — ASSESSMENT & PLAN NOTE
· Patient lying of left knee pain that acutely worsened after helping a friend put together a basketball not  When he states he felt a pop in his left knee  · Patient was in the emergency department and had drainage of the left knee with bloody and yellow tinged fluid and also had anesthetic injected to help with knee pain  · Will send referral to physical therapy  · Advised patient to use ice and heat as necessary  · Also advised patient to apply Bony-Lockett or icy Hot to the affected area  · Patient can take Tylenol 650 q 6 hours p r n  for pain and alternate with ibuprofen

## 2019-08-01 NOTE — ASSESSMENT & PLAN NOTE
· Patient currently smoking and has no plans of quitting at this time  · Will reassess at next visit

## 2019-08-01 NOTE — PROGRESS NOTES
ASSESSMENT/PLAN:  Problem List Items Addressed This Visit        Digestive    Hepatic cirrhosis ([facility] [address] ) (Chronic)    Relevant Orders    Ambulatory referral to Physical Therapy    Chronic hepatitis C virus infection ([facility] [address] ) (Chronic)    Relevant Orders    Ambulatory referral to Gastroenterology    US right upper quadrant       Other    Nicotine dependence (Chronic)     · Patient currently smoking and has no plans of quitting at this time  · Will reassess at next visit         Left knee pain     · Patient lying of left knee pain that acutely worsened after helping a friend put together a basketball not  When he states he felt a pop in his left knee  · Patient was in the emergency department and had drainage of the left knee with bloody and yellow tinged fluid and also had anesthetic injected to help with knee pain  · Will send referral to physical therapy  · Advised patient to use ice and heat as necessary  · Also advised patient to apply Bony-Lockett or icy Hot to the affected area  · Patient can take Tylenol 650 q 6 hours p r n  for pain and alternate with ibuprofen  Relevant Orders    Ambulatory referral to Physical Therapy    Screening for colon cancer     · Referral sent to Gastroenterology         Relevant Orders    Ambulatory referral to Gastroenterology      Other Visit Diagnoses     Need for pneumococcal vaccination    -  Primary    Relevant Orders    PNEUMOCOCCAL POLYSACCHARIDE VACCINE 23-VALENT =>1YO SQ IM          Health Maintenance:  Referral sent to Gastroenterology for colonoscopy  Right upper quadrant ultrasound ordered for [facility] [address]  screening    CHIEF COMPLAINT: L knee pain     HISTORY OF PRESENT ILLNESS:  78-year-old male past medical history significant for hepatic cirrhosis, chronic hepatitis-C, and nicotine dependence    Patient states that he was unable to follow with Gastroenterology after last visit as he did not have insurance at that time, but has since acquired insurance and would like to follow with them  Patient is open to undergoing colonoscopy  Patient states that he has been having knee pain for the past year and then it recently became worse after he was helping his friend set up a basketball in that, and when he went to stand up he felt a pop in his knee  Patient was evaluated in the emergency department for this knee pain and at that time he had drainage of the left knee removing 5 cc of yellow and blood-tinged fluid  Patient also had anesthetic injection into the knee to help with pain  Patient denies any trauma to the knee and denies any locking of the knee  Patient does not feel unstable when walking on the knee but states he does feel increased weakness in his left leg  Patient also states he does have occasionally have numbness in his foot in all digits except for his 1st digit on the left side  Patient had no other complaints on examination  Patient is currently smoking, but denies any alcohol or drug use  Review of Systems   Constitutional: Negative for chills, fatigue, fever and unexpected weight change  HENT: Negative for congestion, ear pain, sore throat and tinnitus  Eyes: Negative for visual disturbance  Respiratory: Negative for cough, chest tightness, shortness of breath and wheezing  Cardiovascular: Negative for chest pain, palpitations and leg swelling  Gastrointestinal: Negative for abdominal pain, constipation, diarrhea, nausea and vomiting  Genitourinary: Negative for dysuria and frequency  Musculoskeletal: Negative for joint swelling  Skin: Negative for rash  Neurological: Negative for dizziness, weakness, light-headedness, numbness and headaches         OBJECTIVE:  Vitals:    08/01/19 1316   BP: 130/90   BP Location: Right arm   Patient Position: Sitting   Cuff Size: Adult   Pulse: 100   Temp: 98 3 °F (36 8 °C)   TempSrc: Oral   Weight: 75 2 kg (165 lb 12 6 oz)   Height: 5' 10" (1 778 m)     Physical Exam   Constitutional: He is oriented to person, place, and time  He appears well-developed and well-nourished  No distress  HENT:   Head: Normocephalic and atraumatic  Eyes: Conjunctivae are normal  No scleral icterus  Cardiovascular: Normal rate, regular rhythm and normal heart sounds  Exam reveals no gallop and no friction rub  No murmur heard  Pulmonary/Chest: Effort normal and breath sounds normal  No respiratory distress  He has no wheezes  He has no rales  Abdominal: Soft  Bowel sounds are normal  He exhibits no distension  There is no tenderness  Musculoskeletal: Normal range of motion  He exhibits tenderness  He exhibits no edema  Dry are test negative on left leg  No Baker cyst noted on left leg  Slight crepitus noted when palpating left patella  Patient did have increased tenderness when palpating over LCL  Neurological: He is alert and oriented to person, place, and time  Skin: Skin is warm  No rash noted  Nursing note and vitals reviewed  Current Outpatient Medications:     diclofenac sodium (VOLTAREN) 1 %, Apply 2 g topically 4 (four) times a day (Patient not taking: Reported on 7/28/2019), Disp: 100 g, Rfl: 1    Past Medical History:   Diagnosis Date    Back pain, chronic     Cirrhosis ([facility] [address] )     Hepatitis     C    Lyme disease      Past Surgical History:   Procedure Laterality Date    ESOPHAGOGASTRODUODENOSCOPY N/A 3/8/2018    Procedure: ESOPHAGOGASTRODUODENOSCOPY (EGD); Surgeon: Nadeen Grier MD;  [facility]: [facility];   Service: Gastroenterology    WRIST SURGERY Right     8/8/88     Social History     Socioeconomic History    Marital status: Single     Spouse name: Not on file    Number of children: Not on file    Years of education: Not on file    Highest education level: Not on file   Occupational History    Not on file   Social Needs    Financial resource strain: Not on file    Food insecurity:     Worry: Not on file     Inability: Not on file    Transportation needs:     Medical: Not on file Non-medical: Not on file   Tobacco Use    Smoking status: Current Every Day Smoker     Packs/day: 0 25     Years: 36 00     Pack years: 9 00     Types: Cigarettes    Smokeless tobacco: Never Used   Substance and Sexual Activity    Alcohol use: Yes     Comment: two rum and cokes today    Drug use: No     Comment: Hx of drug abuse    Sexual activity: Not Currently   Lifestyle    Physical activity:     Days per week: Not on file     Minutes per session: Not on file    Stress: Not on file   Relationships    Social connections:     Talks on phone: Not on file     Gets together: Not on file     Attends Nondenominational service: Not on file     Active member of club or organization: Not on file     Attends meetings of clubs or organizations: Not on file     Relationship status: Not on file    Intimate partner violence:     Fear of current or ex partner: Not on file     Emotionally abused: Not on file     Physically abused: Not on file     Forced sexual activity: Not on file   Other Topics Concern    Not on file   Social History Narrative    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

## 2019-08-02 LAB
BACTERIA BLD CULT: NORMAL
BACTERIA BLD CULT: NORMAL

## 2019-08-26 ENCOUNTER — EVALUATION (OUTPATIENT)
Dept: PHYSICAL THERAPY | Facility: REHABILITATION | Age: 59
End: 2019-08-26
Payer: COMMERCIAL

## 2019-08-26 VITALS — DIASTOLIC BLOOD PRESSURE: 80 MMHG | SYSTOLIC BLOOD PRESSURE: 130 MMHG

## 2019-08-26 DIAGNOSIS — M25.562 ACUTE PAIN OF LEFT KNEE: ICD-10-CM

## 2019-08-26 DIAGNOSIS — K70.31 ALCOHOLIC CIRRHOSIS OF LIVER WITH ASCITES (HCC): Chronic | ICD-10-CM

## 2019-08-26 PROCEDURE — 97162 PT EVAL MOD COMPLEX 30 MIN: CPT | Performed by: PHYSICAL THERAPIST

## 2019-08-26 NOTE — PROGRESS NOTES
PT Evaluation     Today's date: 2019  Patient name: Brad Rodriguez  : 1960  MRN: [id number]  Referring provider: Clive Decker DO  Dx:   Encounter Diagnosis     ICD-10-CM    1  Alcoholic cirrhosis of liver with ascites ([facility] [address] ) K70 31 Ambulatory referral to Physical Therapy   2  Acute pain of left knee M25 562 Ambulatory referral to Physical Therapy       Start Time:   Stop Time: 1620  Total time in clinic (min): 50 minutes    Assessment  Assessment details: Brad Rodriguez is a 63 y o  male who presents to therapy for an acute exacerbation of chronic knee pain  Signs and sx's are suggestive of prepatellar bursitis, but pt also has x-ray findings for moderate narrowing of the medial tibiofemoral joint space with marginal osteophyte formation and small patellar spurs  Pt presents with decreased LLE strength, decreased knee ROM, L knee swelling, and pain that is limiting tolerance to ADLs/ iADLs such as walking, stairs, and bike riding which is pts primary mode of transportation  Pt will benefit from skilled outpatient PT to address the below stated impairments, to address therapy goals, to reduce pain, and improve function  Therapist explained to pt: findings of IE, rehab diagnosis, and POC  Pt-centered goals reviewed and confirmed by pt  Instruction also given for HEP  Pt expressed verbal agreement and understanding and verified understanding via teach back method  Pt also expressed satisfaction that their current concerns were addressed at the end of the session  Impairments: abnormal or restricted ROM, activity intolerance, impaired physical strength, lacks appropriate home exercise program and pain with function  Barriers to therapy: None   Understanding of Dx/Px/POC: good   Prognosis: good    Goals  Short term goals: to be met in 4 weeks  Pt independent with initial HEP, rationale, technique and frequency, for ROM and pain control    Pt will report at least a 25% reduction in subjective pain complaints/symptoms to manage ADLs with reduced pain  Improve L hip strength to 4-/5 or greater for improved knee stability  Pt will report an improvement in max pain score by at least 2 points on the numeric pain rating scale (NPRS) to a level of 7/10 or less indicating a clinically important change and overall decrease in pain  Long term goals: to be met in 8 weeks  Pt will report at least a 75% reduction in subjective pain complaints/symptoms to manage ADLs with reduced pain  Pt will demonstrate WNL and pain free L knee AROM for improved ease of ADLs, transfers, and ambulation  Increase strength of L lower extremity musculature to at least a 4+/5 to better manage functional mobility  Pt will be able to ride his bike without pain or sx's to manage community mobility  Pt will be able to perform ADLs, iADLs, and household chores without pain or restriction indicating return to PLOF  Pt will improve FOTO score to better then expected outcome indicating an overall improvement in pain and function   Pt able to reciprocally ascend/descend 1 flight of stairs without pain or substitution to be able to get to second floor in house  Plan  Patient would benefit from: PT eval  Planned modality interventions: TENS, thermotherapy: hydrocollator packs, traction, ultrasound, cryotherapy and low level laser therapy  Planned therapy interventions: body mechanics training, therapeutic training, therapeutic exercise, therapeutic activities, stretching, strengthening, neuromuscular re-education, patient education, home exercise program, functional ROM exercises, flexibility, manual therapy, Karimi taping, joint mobilization and balance  Frequency: 3x week  Duration in weeks: 8  Treatment plan discussed with: patient        Subjective Evaluation    History of Present Illness  Onset date: 2 weeks ago  Mechanism of injury: Pt is a 62 yo M who presents with L knee pain   Pt reports he has had knee pain for years with a hx of a bakers cyst  Then the knee pain became acutely worsened after helping a friend put together a basketball net, approx 2 weeks ago  He states he felt a pop in his left knee when trying to get up from kneeling on the hard floor  Per chart review, patient was in the ED and had drainage of the left knee with bloody and yellow tinged fluid and also had anesthetic injected to help with knee pain  Currently pt reports pain with stairs  Denies numbness and tingling, but does feel the knee will give way at times  Denies feeling of catching or getting stuck  Pain also increases with riding bike (pt rode to therapy today due to not having a ride)  Tried heat, ice, and Bengay without relief  Pain is limiting tolerance to walking  Pain does not disturb sleep  Pt states he was told in the past the next step would be a knee replacement and the pt would like to avoid this  Recurrent probem    Quality of life: good    Pain  Current pain ratin  At best pain ratin  At worst pain ratin  Location: around the patella  Quality: dull ache  Relieving factors: rest and relaxation  Aggravating factors: stair climbing  Progression: no change    Social Support  Steps to enter house: yes (15)  Stairs in house: no   Lives in: apartment  Lives with: alone    Employment status: not working  Exercise history: riding bike daily      Diagnostic Tests  X-ray: abnormal (L knee - moderate narrowing of the medial tibiofemoral joint space with marginal osteophyte formation and small patellar spurs  Sharpening of the medial tibial spine )  Treatments  No previous or current treatments  Patient Goals  Patient goals for therapy: decreased pain and return to sport/leisure activities  Patient goal: avoid surgery        Objective     Palpation     Additional Palpation Details  Tenderness over the suprapatellar area  No tenderness of the patellar tendon or posterior knee   Mild warmth present in the L knee, no redness    Active Range of Motion   Left Knee   Flexion: 100 degrees with pain  Extension: -5 degrees with pain    Right Knee   Flexion: 145 degrees   Extension: 0 degrees     Additional Active Range of Motion Details  Pain with medial patellar glide, inferior and superior      Strength/Myotome Testing     Left Hip   Planes of Motion   Flexion: 5  Extension: 3+  Abduction: 3+    Right Hip   Planes of Motion   Flexion: 5  Extension: 4+  Abduction: 4+    Left Knee   Flexion: 4  Prone flexion: 3+  Extension: 4+    Right Knee   Flexion: 5  Prone flexion: 5  Extension: 5    Left Ankle/Foot   Dorsiflexion: 5    Right Ankle/Foot   Dorsiflexion: 5    Tests     Left Knee   Positive medial Florecita  Negative anterior drawer, lateral Florecita, patellar apprehension, patellar compression and posterior drawer  Functional Assessment        Forward Step Up 8"   Left Leg  Pain, increased forward trunk lean and increased contralateral push off  Right Leg  Within functional limits  No pain and no ipsilateral trunk side bending       General Comments:      Knee Comments  Supra patellar joint effusion on the L             Precautions:  Hep C, hx of alcoholism, cirrhosis, Lymes, (+) depression screen - pt will talk to PCP about that      Manual  8/26            Hamstring, gastroc stretching             Patellar glides/mobs to tolerance, tibiofemoral AP/glides                                       FOTO 26/48                Exercise Diary  8/26            Vigor gym with hip add             SLR 4 way 2x10 each            TKE with band             Standing hip ABD with band             Bridges with hip add sets             Pball ham curls                                                                                                                                                                                                       Modalities  8/26            Cold pack x10 - supine                                        Visual inspection and sensation of skin checked before, during, and after application of moist heat/cold pack performed  Skin appears normal and sensation is intact  Pt instructed to inform therapist if the heat/cold becomes uncomfortable and to notify the rehab staff immediately  Skin inspection following moist heat/cold reveals skin is normal and intact with mild redness, but no evidence of burning/skin irritation  6-8 layers of toweling used for hot pack application/pillow case used for cold pack application

## 2019-09-09 NOTE — PROGRESS NOTES
Pt no showed 2 visits since eval and has not returned for follow up   Message left for pt that he will be discharged from therapy at this time per attendance policy

## 2020-07-27 ENCOUNTER — TELEPHONE (OUTPATIENT)
Dept: INTERNAL MEDICINE CLINIC | Facility: CLINIC | Age: 60
End: 2020-07-27

## 2020-08-21 ENCOUNTER — HOSPITAL ENCOUNTER (EMERGENCY)
Facility: HOSPITAL | Age: 60
Discharge: HOME | End: 2020-08-21
Attending: EMERGENCY MEDICINE | Admitting: EMERGENCY MEDICINE
Payer: COMMERCIAL

## 2020-08-21 ENCOUNTER — APPOINTMENT (EMERGENCY)
Dept: RADIOLOGY | Facility: HOSPITAL | Age: 60
End: 2020-08-21
Payer: COMMERCIAL

## 2020-08-21 VITALS
BODY MASS INDEX: 21.94 KG/M2 | HEIGHT: 72 IN | TEMPERATURE: 98.2 F | OXYGEN SATURATION: 97 % | DIASTOLIC BLOOD PRESSURE: 108 MMHG | HEART RATE: 82 BPM | SYSTOLIC BLOOD PRESSURE: 166 MMHG | RESPIRATION RATE: 20 BRPM | WEIGHT: 162 LBS

## 2020-08-21 DIAGNOSIS — M25.562 ACUTE PAIN OF LEFT KNEE: ICD-10-CM

## 2020-08-21 DIAGNOSIS — M25.562 LEFT KNEE PAIN: Primary | ICD-10-CM

## 2020-08-21 PROCEDURE — 99283 EMERGENCY DEPT VISIT LOW MDM: CPT

## 2020-08-21 PROCEDURE — 73564 X-RAY EXAM KNEE 4 OR MORE: CPT

## 2020-08-21 PROCEDURE — 99284 EMERGENCY DEPT VISIT MOD MDM: CPT | Performed by: PHYSICIAN ASSISTANT

## 2020-08-21 NOTE — ED PROVIDER NOTES
History  Chief Complaint   Patient presents with    Knee Pain     swelling and pain onset 1 year, previously drained per patient, worsening pain the past few days from ambulating a lot lately     67-year-old male presents to emergency room for evaluation of left knee pain  Onset 2 days ago  States he has had this happen before requiring aspiration  States he has also had Baker cyst drained in the past   States today pain is noted to be more in the front of the knee gives out when tries to use the stairs  He started using cane secondary to the pain  Admits to mild swelling  Denies ankle or hip pain  Denies rash  Denies fever  No history of knee surgery  Denies injury  History provided by:  Patient      Prior to Admission Medications   Prescriptions Last Dose Informant Patient Reported? Taking?   diclofenac sodium (VOLTAREN) 1 %   No No   Sig: Apply 2 g topically 4 (four) times a day   Patient not taking: Reported on 7/28/2019   diclofenac sodium (VOLTAREN) 1 %   No No   Sig: Apply 4 g topically 4 (four) times a day for 14 days Left knee      Facility-Administered Medications: None       Past Medical History:   Diagnosis Date    Arthritis     Back pain, chronic     Cirrhosis ([facility] [address] )     Hepatitis     C    Hepatitis C     Lyme disease        Past Surgical History:   Procedure Laterality Date    ESOPHAGOGASTRODUODENOSCOPY N/A 3/8/2018    Procedure: ESOPHAGOGASTRODUODENOSCOPY (EGD); Surgeon: Trey Centeno MD;  [facility]: [facility]; Service: Gastroenterology    WRIST SURGERY Right     8/8/88       Family History   Problem Relation Age of Onset    Colon cancer Mother      I have reviewed and agree with the history as documented      E-Cigarette/Vaping     E-Cigarette/Vaping Substances     Social History     Tobacco Use    Smoking status: Current Every Day Smoker     Packs/day: 0 25     Years: 36 00     Pack years: 9 00     Types: Cigarettes    Smokeless tobacco: Never Used   Substance Use Topics  Alcohol use: Not Currently     Comment: two rum and cokes today    Drug use: No     Comment: Hx of drug abuse       Review of Systems   Constitutional: Negative for chills and fever  Respiratory: Negative for shortness of breath  Cardiovascular: Negative for chest pain  Gastrointestinal: Negative for nausea and vomiting  Musculoskeletal: Positive for joint swelling  Skin: Negative for rash  Neurological: Negative for weakness and numbness  Physical Exam  Physical Exam  Vitals signs and nursing note reviewed  Constitutional:       Appearance: Normal appearance  He is well-developed  HENT:      Head: Normocephalic and atraumatic  Cardiovascular:      Pulses: Normal pulses  Musculoskeletal:      Left hip: Normal       Left knee: He exhibits decreased range of motion, swelling (mild), bony tenderness and abnormal meniscus  He exhibits no effusion, no ecchymosis, no deformity, no laceration, no erythema, no LCL laxity, normal patellar mobility and no MCL laxity  Tenderness found  Medial joint line and lateral joint line tenderness noted  Left ankle: Normal    Skin:     General: Skin is warm and dry  Neurological:      Mental Status: He is alert and oriented to person, place, and time     Psychiatric:         Mood and Affect: Mood normal          Vital Signs  ED Triage Vitals [08/21/20 1410]   Temperature Pulse Respirations Blood Pressure SpO2   98 2 °F (36 8 °C) 92 20 (!) 197/106 97 %      Temp Source Heart Rate Source Patient Position - Orthostatic VS BP Location FiO2 (%)   Oral Monitor Sitting Left arm --      Pain Score       8           Vitals:    08/21/20 1410 08/21/20 1623   BP: (!) 197/106 (!) 166/108   Pulse: 92 82   Patient Position - Orthostatic VS: Sitting Lying         Visual Acuity      ED Medications  Medications - No data to display    Diagnostic Studies  Results Reviewed     None                 XR knee 4+ vw left injury   ED Interpretation by Lovetta Sacks, PA-C (08/21 1609)   + moderate OA, NAD, unrelated punctate FB seen on lateral posteriorly       by [employer] (08/21 1600)                 Procedures  Procedures     Ace wrap left knee applied by myself, neuro vasculature intact status post application  ED Course       US AUDIT      Most Recent Value   Initial Alcohol Screen: US AUDIT-C    1  How often do you have a drink containing alcohol?  0 Filed at: 08/21/2020 1433   2  How many drinks containing alcohol do you have on a typical day you are drinking? 0 Filed at: 08/21/2020 1433   3a  Male UNDER 65: How often do you have five or more drinks on one occasion? 0 Filed at: 08/21/2020 1433   3b  FEMALE Any Age, or MALE 65+: How often do you have 4 or more drinks on one occassion? 0 Filed at: 08/21/2020 1433   Audit-C Score  0 Filed at: 08/21/2020 1433                  SHREYAS/DAST-10      Most Recent Value   How many times in the past year have you    Used an illegal drug or used a prescription medication for non-medical reasons? Never Filed at: 08/21/2020 1438                                MDM  Number of Diagnoses or Management Options  Acute pain of right knee:      Amount and/or Complexity of Data Reviewed  Tests in the radiology section of CPT®: ordered and reviewed    Risk of Complications, Morbidity, and/or Mortality  General comments: Differential diagnosis includes but is not limited to: meniscus injury, OA, bursitis, tendonitis, sprain, strain Baker cyst    Discussed importance of follow-up with Orthopedics, patient understands and agreed is so  Also discussed with patient re-evaluation of blood pressure with primary physician and he is amendable to this  Denies symptoms of high blood pressure at this time      Patient Progress  Patient progress: stable        Disposition  Final diagnoses:   Acute pain of left knee     Time reflects when diagnosis was documented in both MDM as applicable and the Disposition within this note     Time User Action Codes Description Comment    8/21/2020  4:10 PM Marnie Matar Add [H82 083] Acute pain of right knee     8/21/2020  4:10 PM Marnie Matar Add [T23 128] Left knee pain     8/21/2020  4:11 PM Marnie Matar Modify [I13 230] Left knee pain     8/21/2020  4:25 PM Marnie Matar Modify [A44 500] Left knee pain     8/21/2020  4:25 PM Marnie Matar Remove [U17 457] Acute pain of right knee     8/21/2020  4:25 PM Marnie Matar Add [A70 289] Acute pain of left knee       ED Disposition     ED Disposition Condition Date/Time Comment    Discharge Stable Fri Aug 21, 2020  4:10 PM Destinee Mcgee discharge to home/self care  Follow-up Information     Follow up With Specialties Details Why Contact Info Additional Information    [address] Orthopedic Surgery In 3 days  [address] [id number]  [phone] [address], [address], [address] [facility], [address] Emergency Department Emergency Medicine  If symptoms worsen [address]  [phone] [facility], [address] [facility], [address] [facility], [address] [phone]          Current Discharge Medication List      CONTINUE these medications which have CHANGED    Details   diclofenac sodium (VOLTAREN) 1 % Apply 4 g topically 4 (four) times a day for 14 days Left knee  Qty: 200 g, Refills: 0    Associated Diagnoses: Left knee pain           No discharge procedures on file      PDMP Review     None          ED Provider  Electronically Signed by           Rosa Lockhart PA-C  08/21/20 0305

## 2021-03-16 ENCOUNTER — TELEPHONE (OUTPATIENT)
Dept: INTERNAL MEDICINE CLINIC | Facility: CLINIC | Age: 61
End: 2021-03-16

## 2021-03-16 NOTE — TELEPHONE ENCOUNTER
Patient not seen since 2019  Please schedule appt   Form will remain in RED clinical folder until appt

## 2021-03-16 NOTE — TELEPHONE ENCOUNTER
Folder Color- Red    Name of Form- Verification Of Disability    Form to be filled out by- Dr Haylie Ray to be Faxed [phone]    Patient made aware of 10 business day policy

## 2021-03-16 NOTE — TELEPHONE ENCOUNTER
I called the phone number on file  It is his sister's  He does not have a phone number  She is going to get a hold of him and have him call us to schedule an appointment

## 2021-03-16 NOTE — TELEPHONE ENCOUNTER
I called patient to schedule an appt I left a voicemail for a call back  Principal Discharge DX:	Fever

## 2021-03-22 ENCOUNTER — OFFICE VISIT (OUTPATIENT)
Dept: INTERNAL MEDICINE CLINIC | Facility: CLINIC | Age: 61
End: 2021-03-22

## 2021-03-22 VITALS — HEART RATE: 98 BPM | TEMPERATURE: 99.2 F | DIASTOLIC BLOOD PRESSURE: 100 MMHG | SYSTOLIC BLOOD PRESSURE: 190 MMHG

## 2021-03-22 DIAGNOSIS — F17.210 CIGARETTE NICOTINE DEPENDENCE WITHOUT COMPLICATION: Chronic | ICD-10-CM

## 2021-03-22 DIAGNOSIS — G89.29 CHRONIC PAIN OF LEFT KNEE: ICD-10-CM

## 2021-03-22 DIAGNOSIS — Z23 NEED FOR PNEUMOCOCCAL VACCINATION: ICD-10-CM

## 2021-03-22 DIAGNOSIS — K21.9 GASTROESOPHAGEAL REFLUX DISEASE, UNSPECIFIED WHETHER ESOPHAGITIS PRESENT: ICD-10-CM

## 2021-03-22 DIAGNOSIS — I10 ESSENTIAL HYPERTENSION: ICD-10-CM

## 2021-03-22 DIAGNOSIS — Z12.2 ENCOUNTER FOR SCREENING FOR LUNG CANCER: ICD-10-CM

## 2021-03-22 DIAGNOSIS — D50.9 MICROCYTIC ANEMIA: ICD-10-CM

## 2021-03-22 DIAGNOSIS — K29.70 GASTRITIS: ICD-10-CM

## 2021-03-22 DIAGNOSIS — Z12.11 SCREENING FOR COLON CANCER: ICD-10-CM

## 2021-03-22 DIAGNOSIS — Z23 NEED FOR HEPATITIS A IMMUNIZATION: ICD-10-CM

## 2021-03-22 DIAGNOSIS — D64.9 ANEMIA, UNSPECIFIED TYPE: ICD-10-CM

## 2021-03-22 DIAGNOSIS — Z23 NEED FOR HEPATITIS B VACCINATION: ICD-10-CM

## 2021-03-22 DIAGNOSIS — K70.31 ALCOHOLIC CIRRHOSIS OF LIVER WITH ASCITES (HCC): ICD-10-CM

## 2021-03-22 DIAGNOSIS — B18.2 CHRONIC HEPATITIS C WITHOUT HEPATIC COMA (HCC): Primary | ICD-10-CM

## 2021-03-22 DIAGNOSIS — H53.9 VISUAL DISTURBANCE: ICD-10-CM

## 2021-03-22 DIAGNOSIS — I85.11 SECONDARY ESOPHAGEAL VARICES WITH BLEEDING (HCC): ICD-10-CM

## 2021-03-22 DIAGNOSIS — I85.00 VARICES, ESOPHAGEAL (HCC): ICD-10-CM

## 2021-03-22 DIAGNOSIS — K92.0 HEMATEMESIS: ICD-10-CM

## 2021-03-22 DIAGNOSIS — Z00.00 ANNUAL PHYSICAL EXAM: ICD-10-CM

## 2021-03-22 DIAGNOSIS — M25.562 CHRONIC PAIN OF LEFT KNEE: ICD-10-CM

## 2021-03-22 DIAGNOSIS — I71.4 ABDOMINAL AORTIC ANEURYSM (AAA) WITHOUT RUPTURE (HCC): ICD-10-CM

## 2021-03-22 PROBLEM — F10.10 ALCOHOL ABUSE: Status: ACTIVE | Noted: 2021-03-22

## 2021-03-22 PROCEDURE — 90746 HEPB VACCINE 3 DOSE ADULT IM: CPT | Performed by: INTERNAL MEDICINE

## 2021-03-22 PROCEDURE — 99213 OFFICE O/P EST LOW 20 MIN: CPT | Performed by: INTERNAL MEDICINE

## 2021-03-22 PROCEDURE — 90471 IMMUNIZATION ADMIN: CPT | Performed by: INTERNAL MEDICINE

## 2021-03-22 PROCEDURE — 90632 HEPA VACCINE ADULT IM: CPT | Performed by: INTERNAL MEDICINE

## 2021-03-22 PROCEDURE — 90732 PPSV23 VACC 2 YRS+ SUBQ/IM: CPT | Performed by: INTERNAL MEDICINE

## 2021-03-22 PROCEDURE — 4004F PT TOBACCO SCREEN RCVD TLK: CPT | Performed by: INTERNAL MEDICINE

## 2021-03-22 PROCEDURE — 90472 IMMUNIZATION ADMIN EACH ADD: CPT | Performed by: INTERNAL MEDICINE

## 2021-03-22 RX ORDER — NADOLOL 20 MG/1
10 TABLET ORAL DAILY
Qty: 90 TABLET | Refills: 0 | Status: SHIPPED | OUTPATIENT
Start: 2021-03-22 | End: 2021-04-02 | Stop reason: SDUPTHER

## 2021-03-22 RX ORDER — PANTOPRAZOLE SODIUM 40 MG/1
40 TABLET, DELAYED RELEASE ORAL DAILY
Qty: 30 TABLET | Refills: 2 | Status: SHIPPED | OUTPATIENT
Start: 2021-03-22 | End: 2021-10-13 | Stop reason: SDUPTHER

## 2021-03-22 RX ORDER — LISINOPRIL 20 MG/1
10 TABLET ORAL DAILY
Qty: 90 TABLET | Refills: 0 | Status: SHIPPED | OUTPATIENT
Start: 2021-03-22 | End: 2021-04-02 | Stop reason: SDUPTHER

## 2021-03-22 RX ORDER — BLOOD PRESSURE TEST KIT-MEDIUM
KIT MISCELLANEOUS 2 TIMES DAILY
Qty: 1 EACH | Refills: 0 | Status: SHIPPED | OUTPATIENT
Start: 2021-03-22 | End: 2021-06-20

## 2021-03-22 RX ORDER — NADOLOL 20 MG/1
20 TABLET ORAL DAILY
Qty: 90 TABLET | Refills: 1 | Status: SHIPPED | OUTPATIENT
Start: 2021-03-22 | End: 2021-03-22

## 2021-03-22 RX ORDER — AMLODIPINE BESYLATE 10 MG/1
10 TABLET ORAL DAILY
Qty: 90 TABLET | Refills: 1 | Status: SHIPPED | OUTPATIENT
Start: 2021-03-22 | End: 2021-03-22 | Stop reason: CLARIF

## 2021-03-22 RX ORDER — LISINOPRIL 20 MG/1
20 TABLET ORAL DAILY
Qty: 90 TABLET | Refills: 1 | Status: SHIPPED | OUTPATIENT
Start: 2021-03-22 | End: 2021-03-22

## 2021-03-22 NOTE — PROGRESS NOTES
[facility]  INTERNAL MEDICINE OFFICE VISIT     PATIENT INFORMATION     Melisa Arteaga   62 y o  male   MRN: [id number]    ASSESSMENT/PLAN     Diagnoses and all orders for this visit:    Chronic hepatitis C without hepatic coma [facility])  -     Ambulatory referral to Gastroenterology; Future  -     Hepatitis C RNA, quantitative, PCR; Future  -     Quantiferon TB Gold Plus; Future  -     Protime-INR; Future  -     US right upper quadrant; Future    Alcoholic cirrhosis of liver with ascites ([facility] )  -     Ambulatory referral to Gastroenterology; Future  -     Protime-INR; Future  -     AFP tumor marker; Future  -     US right upper quadrant; Future    Screening for colon cancer  -     Ambulatory referral to Gastroenterology; Future    Need for pneumococcal vaccination    Essential hypertension  -     Discontinue: amLODIPine (NORVASC) 10 mg tablet; Take 1 tablet (10 mg total) by mouth daily  -     Discontinue: lisinopril (ZESTRIL) 20 mg tablet; Take 1 tablet (20 mg total) by mouth daily  -     Blood Pressure Monitoring (Blood Press Monitor/M-L Cuff) MISC; Use 2 (two) times a day  -     lisinopril (ZESTRIL) 20 mg tablet; Take 0 5 tablets (10 mg total) by mouth daily    Annual physical exam  -     CBC and differential; Future  -     Comprehensive metabolic panel; Future    Varices, esophageal (HCC)  -     CBC and differential; Future  -     Discontinue: nadolol (CORGARD) 20 mg tablet; Take 1 tablet (20 mg total) by mouth daily  -     nadolol (CORGARD) 20 mg tablet; Take 0 5 tablets (10 mg total) by mouth daily    Abdominal aortic aneurysm (AAA) without rupture ([facility] )  -     [address] AAA; Future    Visual disturbance  -     Ambulatory referral to Ophthalmology; Future    Microcytic anemia  -     Iron, TIBC and Ferritin Panel; Future  -     H  pylori antigen, stool;  Future    Encounter for screening for lung cancer  -     CT lung screening program; Future    Gastroesophageal reflux disease, unspecified whether esophagitis present  -     pantoprazole (PROTONIX) 40 mg tablet; Take 1 tablet (40 mg total) by mouth daily  -     H  pylori antigen, stool; Future    Hematemesis  -     pantoprazole (PROTONIX) 40 mg tablet; Take 1 tablet (40 mg total) by mouth daily  -     H  pylori antigen, stool; Future    Chronic pain of left knee  -     Ambulatory referral to Orthopedic Surgery; Future      Educated on smoking cessation/ lifestyle behavioral modifications  Schedule a follow-up appointment in 2 weeks for BP and complete vaccinations- influenza and Tdap    HEALTH MAINTENANCE     Immunization History   Administered Date(s) Administered    INFLUENZA 03/10/2018    Influenza Quadrivalent Preservative Free 3 years and older IM 03/10/2018     CHIEF COMPLAINT     Chief Complaint   Patient presents with    Annual Exam      HISTORY OF PRESENT ILLNESS     Pt is a 41-year-old male significant past medical history of alcohol/ hep C cirrhosis decompensated by small varices/GI bleed 3/2018, tobacco abuse, untreated hypertension, left knee pain/ Baker's cyst who presents to the clinic for routine follow-up  Multiple issues were discussed during visit  Patient had an episode of hematemesis 2 days ago otherwise no other episodes  Denies any hematochezia or melena, abdominal pain  Patient taking Tums for acid reflux for which he complains of upper epigastric pain and significant reflux  Patient otherwise only complaining of left knee pain  Patient stop drinking alcohol about 1 5 years ago  elevated blood pressure of 190/100  Repeat manual blood pressure similar  Denies any chest pain or shortness of breath visual issues headache  Patient's previous readings are also elevated and chart  Patient states he is measured blood pressure at home from his friends blood pressure cuff measuring SBP is of 160s  Patient not taking not allow all 10 mg as instructed after varices were banded 3/2018    Will initiate not allow all 10 mg daily as well as lisinopril 10 mg daily and provide blood pressure cuff monitoring at home  Will like to see patient in 2 weeks with blood pressure diary and titrate medications as needed  Will obtain CMP to check renal function  Regarding patient's cirrhosis--hep C has not been treated, will repeat hep C viral load  For [address]  Will obtain right upper quadrant ultrasound/ AFP, and QuantiFERON  Patient denies any fevers chills night sweats unintentional weight loss  GI referral provided patient needs a repeat EGD and screening colonoscopy  CBC CMP also ordered  Patient obtained hep A and hep B vaccines in clinic today  Patient has not been taking nadolol 10 mg daily    Regarding patient's abdominal aortic aneurysm of 2 5 cm- will need surveillance with an repeat abdominal ultrasound  Left knee pain/previous Baker cyst which has been drained x2 in the past-- patient using topical Voltaren gel and wearing a brace  Will provide orthopedic referral    Patient also requesting for an ophthalmology referral     REVIEW OF SYSTEMS     Review of Systems   Constitutional: Negative  HENT: Negative  Eyes: Negative  Respiratory: Positive for shortness of breath  Cardiovascular: Negative  Gastrointestinal: Positive for vomiting (hematemasis)  Endocrine: Negative  Genitourinary: Negative  Musculoskeletal: Positive for arthralgias  Skin: Negative  Allergic/Immunologic: Negative  Neurological: Negative  Hematological: Negative  Psychiatric/Behavioral: Negative  OBJECTIVE     Vitals:    03/22/21 0932   BP: (!) 190/100   BP Location: Left arm   Patient Position: Sitting   Cuff Size: Standard   Pulse: 98   Temp: 99 2 °F (37 3 °C)   TempSrc: Temporal     Physical Exam  Vitals signs and nursing note reviewed  Constitutional:       General: He is not in acute distress  Appearance: Normal appearance  He is normal weight  He is not toxic-appearing  HENT:      Head: Normocephalic and atraumatic  Nose: Nose normal       Mouth/Throat:      Mouth: Mucous membranes are dry  Pharynx: Oropharynx is clear  Eyes:      Extraocular Movements: Extraocular movements intact  Conjunctiva/sclera: Conjunctivae normal       Pupils: Pupils are equal, round, and reactive to light  Neck:      Musculoskeletal: Normal range of motion and neck supple  Cardiovascular:      Rate and Rhythm: Normal rate and regular rhythm  Pulses: Normal pulses  Heart sounds: Normal heart sounds  No murmur  No gallop  Pulmonary:      Effort: Pulmonary effort is normal       Breath sounds: Normal breath sounds  Abdominal:      General: Abdomen is flat  Neurological:      Mental Status: He is alert  CURRENT MEDICATIONS     Current Outpatient Medications:     amLODIPine (NORVASC) 10 mg tablet, Take 1 tablet (10 mg total) by mouth daily, Disp: 90 tablet, Rfl: 1    Blood Pressure Monitoring (Blood Press Monitor/M-L Cuff) MISC, Use 2 (two) times a day, Disp: 1 each, Rfl: 0    diclofenac sodium (VOLTAREN) 1 %, Apply 4 g topically 4 (four) times a day for 14 days Left knee (Patient not taking: Reported on 3/22/2021), Disp: 200 g, Rfl: 0    lisinopril (ZESTRIL) 20 mg tablet, Take 1 tablet (20 mg total) by mouth daily, Disp: 90 tablet, Rfl: 1    nadolol (CORGARD) 20 mg tablet, Take 1 tablet (20 mg total) by mouth daily, Disp: 90 tablet, Rfl: 1    pantoprazole (PROTONIX) 40 mg tablet, Take 1 tablet (40 mg total) by mouth daily, Disp: 30 tablet, Rfl: 2    PAST MEDICAL & SURGICAL HISTORY     Past Medical History:   Diagnosis Date    Arthritis     Back pain, chronic     Cirrhosis (HCC)     Hepatitis     C    Hepatitis C     Lyme disease      Past Surgical History:   Procedure Laterality Date    ESOPHAGOGASTRODUODENOSCOPY N/A 3/8/2018    Procedure: ESOPHAGOGASTRODUODENOSCOPY (EGD); Surgeon: Meena Roe MD;  Location: [facility];   Service: Gastroenterology  WRIST SURGERY Right     8/8/88     SOCIAL & FAMILY HISTORY     Social History     Socioeconomic History    Marital status: Single     Spouse name: Not on file    Number of children: Not on file    Years of education: Not on file    Highest education level: Not on file   Occupational History    Not on file   Social Needs    Financial resource strain: Not on file    Food insecurity     Worry: Not on file     Inability: Not on file    Transportation needs     Medical: Not on file     Non-medical: Not on file   Tobacco Use    Smoking status: Current Every Day Smoker     Packs/day: 1 00     Years: 36 00     Pack years: 36 00     Types: Cigarettes    Smokeless tobacco: Never Used   Substance and Sexual Activity    Alcohol use: Not Currently     Comment: stoped two months ago    Drug use: No     Comment: Hx of drug abuse    Sexual activity: Not Currently   Lifestyle    Physical activity     Days per week: Not on file     Minutes per session: Not on file    Stress: Not on file   Relationships    Social connections     Talks on phone: Not on file     Gets together: Not on file     Attends Orthodoxy service: Not on file     Active member of club or organization: Not on file     Attends meetings of clubs or organizations: Not on file     Relationship status: Not on file    Intimate partner violence     Fear of current or ex partner: Not on file     Emotionally abused: Not on file     Physically abused: Not on file     Forced sexual activity: Not on file   Other Topics Concern    Not on file   Social History Narrative    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        Social History     Substance and Sexual Activity   Alcohol Use Not Currently    Comment: stoped two months ago     Social History     Substance and Sexual Activity   Drug Use No    Comment: Hx of drug abuse     Social History     Tobacco Use   Smoking Status Current Every Day Smoker    Packs/day: 1 00    Years: 36 00    Pack years: 36 00    Types: Cigarettes   Smokeless Tobacco Never Used     Family History   Problem Relation Age of Onset    Colon cancer Mother      ==  Varsha Ascencio MD  Resident, PGY-2  [place]  Internal Medicine [facility]   [facility] , [address] [address] [facility] [address], [address] [facility]  Office: [phone]  Fax: [phone]

## 2021-03-25 NOTE — TELEPHONE ENCOUNTER
LMOM to c/b, form is for disability but none of that was discussed at his appt   He may need to come back

## 2021-03-25 NOTE — TELEPHONE ENCOUNTER
Patient called for status of form as he needs it to move into his new apartment  Can you please make sure Dr Candance Abu completes this form before she leaves tomorrow

## 2021-03-29 NOTE — TELEPHONE ENCOUNTER
PT is scheduled to see Dr Pasha Guan on 4/2/21       Form moved to [facility]

## 2021-04-02 ENCOUNTER — OFFICE VISIT (OUTPATIENT)
Dept: INTERNAL MEDICINE CLINIC | Facility: CLINIC | Age: 61
End: 2021-04-02

## 2021-04-02 VITALS
BODY MASS INDEX: 23.54 KG/M2 | SYSTOLIC BLOOD PRESSURE: 172 MMHG | DIASTOLIC BLOOD PRESSURE: 96 MMHG | TEMPERATURE: 97.3 F | WEIGHT: 173.6 LBS | HEART RATE: 77 BPM

## 2021-04-02 DIAGNOSIS — I85.00 VARICES, ESOPHAGEAL (HCC): ICD-10-CM

## 2021-04-02 DIAGNOSIS — I10 ESSENTIAL HYPERTENSION: ICD-10-CM

## 2021-04-02 DIAGNOSIS — K70.31 ALCOHOLIC CIRRHOSIS OF LIVER WITH ASCITES (HCC): Primary | Chronic | ICD-10-CM

## 2021-04-02 PROCEDURE — 4004F PT TOBACCO SCREEN RCVD TLK: CPT | Performed by: INTERNAL MEDICINE

## 2021-04-02 PROCEDURE — 99214 OFFICE O/P EST MOD 30 MIN: CPT | Performed by: INTERNAL MEDICINE

## 2021-04-02 PROCEDURE — 3080F DIAST BP >= 90 MM HG: CPT | Performed by: INTERNAL MEDICINE

## 2021-04-02 PROCEDURE — 3077F SYST BP >= 140 MM HG: CPT | Performed by: INTERNAL MEDICINE

## 2021-04-02 RX ORDER — NADOLOL 20 MG/1
10 TABLET ORAL DAILY
Qty: 90 TABLET | Refills: 0 | Status: SHIPPED | OUTPATIENT
Start: 2021-04-02 | End: 2021-10-13 | Stop reason: SDUPTHER

## 2021-04-02 RX ORDER — LISINOPRIL 20 MG/1
20 TABLET ORAL DAILY
Qty: 90 TABLET | Refills: 3 | Status: SHIPPED | OUTPATIENT
Start: 2021-04-02 | End: 2021-10-13

## 2021-04-02 NOTE — PROGRESS NOTES
INTERNAL MEDICINE OFFICE VISIT  [facility]  [address] [facility] [place] [facility]""    NAME: Ted Tai  AGE: 62 y o  SEX: male    DATE OF ENCOUNTER: 4/2/2021    Assessment and Plan     # Essential hypertension   mm Hg today's office visit  Increase lisinopril to 20 mg daily  Patient has appointment next week with primary care physician  Encouraged to check blood pressure at home  # Varices, esophageal ([address] )  Secondary to cirrhosis in the setting of alcohol abuse/hepatitis-C  There was some concern at the patient's pharmacy for patient being prescribed nonselective beta-blocker as well as ACE-inhibitor  I have sent nadolol 10 mg daily to the patient's pharmacy  He was encouraged strongly to take his medication for prevention of esophageal variceal bleed  Disability form completed at today's office visit    Chief Complaint     Chief Complaint   Patient presents with    Follow-up     disability forms       History of Present Illness     Patient is a 64-year-old male with a past medical history significant for cirrhosis secondary to alcohol abuse/ hepatitis-C complicated by esophageal varices and UGIB who presents today to have a disability form completed  The patient was seen in the office this past week  The patient did not have any of the laboratory work that was recommended to be done completed as of yet  He has no complaints at today's office visit  He presents solely for completion of disability form  Lisinopril and nadolol worsened to the patient's pharmacy at last office visit however lisinopril was the only medication filled as the pharmacist did not believe that both these medications were appropriate the same time  Review of Systems     Review of Systems   Constitutional: Negative for chills and fever  HENT: Negative for ear pain and sore throat  Eyes: Negative for pain and visual disturbance     Respiratory: Negative for cough and shortness of breath  Cardiovascular: Negative for chest pain and palpitations  Gastrointestinal: Negative for abdominal pain and vomiting  Genitourinary: Negative for dysuria and hematuria  Musculoskeletal: Negative for arthralgias and back pain  Skin: Negative for color change and rash  Neurological: Negative for seizures and syncope  All other systems reviewed and are negative  Active Problem List     Patient Active Problem List   Diagnosis    Leg swelling    Blood loss anemia    Hepatic cirrhosis (HCC)    GI bleed    Chronic hepatitis C virus infection (HCC)    Nicotine dependence    Varices, esophageal (HCC)    Trena-Khalil tear    Heme positive stool    History of alcohol abuse    Blood pressure check    Pain of both elbows    Left knee pain    Screening for colon cancer    GERD (gastroesophageal reflux disease)    Gastritis    Anemia    Alcohol abuse    Essential hypertension       Objective     BP (!) 172/96 (BP Location: Right arm, Patient Position: Sitting, Cuff Size: Standard)   Pulse 77   Temp (!) 97 3 °F (36 3 °C) (Temporal)   Wt 78 7 kg (173 lb 9 6 oz)   BMI 23 54 kg/m²     Physical Exam  Constitutional:       General: He is not in acute distress  Appearance: He is well-developed  He is not diaphoretic  HENT:      Head: Normocephalic and atraumatic  Nose: Nose normal       Mouth/Throat:      Pharynx: No oropharyngeal exudate  Eyes:      General: No scleral icterus  Right eye: No discharge  Left eye: No discharge  Conjunctiva/sclera: Conjunctivae normal    Neck:      Musculoskeletal: Neck supple  Vascular: No JVD  Cardiovascular:      Rate and Rhythm: Normal rate and regular rhythm  Heart sounds: Normal heart sounds  No murmur  No friction rub  No gallop  Pulmonary:      Effort: Pulmonary effort is normal  No respiratory distress  Breath sounds: Normal breath sounds  No wheezing or rales     Abdominal: General: Bowel sounds are normal  There is no distension  Palpations: Abdomen is soft  Tenderness: There is no abdominal tenderness  There is no guarding or rebound  Musculoskeletal: Normal range of motion  Skin:     General: Skin is warm and dry  Findings: No erythema  Neurological:      Mental Status: He is alert and oriented to person, place, and time         Current Medications     Current Outpatient Medications:     Blood Pressure Monitoring (Blood Press Monitor/M-L Cuff) MISC, Use 2 (two) times a day, Disp: 1 each, Rfl: 0    lisinopril (ZESTRIL) 20 mg tablet, Take 1 tablet (20 mg total) by mouth daily, Disp: 90 tablet, Rfl: 3    nadolol (CORGARD) 20 mg tablet, Take 0 5 tablets (10 mg total) by mouth daily, Disp: 90 tablet, Rfl: 0    pantoprazole (PROTONIX) 40 mg tablet, Take 1 tablet (40 mg total) by mouth daily, Disp: 30 tablet, Rfl: 2    Health Maintenance     Health Maintenance   Topic Date Due    HIV Screening  Never done    COVID-19 Vaccine (1) Never done    Annual Physical  Never done    DTaP,Tdap,and Td Vaccines (1 - Tdap) Never done    Colorectal Cancer Screening  Never done    Lung Cancer Screening  Never done    PT PLAN OF CARE  09/25/2019    Depression Screening PHQ  08/26/2020    Influenza Vaccine (1) 09/01/2020    BMI: Adult  08/21/2021    Hepatitis B Vaccine (2 of 3 - Risk 3-dose series) 04/19/2021    Hepatitis A Vaccine (2 of 2 - Risk 2-dose series) 09/22/2021    Pneumococcal Vaccine: Pediatrics (0 to 5 Years) and At-Risk Patients (6 to 59 Years)  Completed    HIB Vaccine  Aged Out    IPV Vaccine  Aged Out    Meningococcal ACWY Vaccine  Aged Out    HPV Vaccine  Aged Out    Hepatitis C Screening  Discontinued     Immunization History   Administered Date(s) Administered    Hep A, adult 03/22/2021    Hep B, adult 03/22/2021    INFLUENZA 03/10/2018    Influenza Quadrivalent Preservative Free 3 years and older IM 03/10/2018    Influenza, seasonal, injectable, preservative free 02/01/2017    Pneumococcal Polysaccharide PPV23 03/22/2021       Selvin Peewee, DO  Internal Medicine  PGY-3  4/2/2021 8:07 AM

## 2021-04-02 NOTE — PATIENT INSTRUCTIONS
Cirrhosis   WHAT YOU NEED TO KNOW:   Cirrhosis is long-term scarring of the liver  The liver makes enzymes and bile that help digest food and gives your body energy  It also removes harmful material from your body, such as alcohol and other chemicals  Cirrhosis is caused by repeated damage to your liver over time  Scar tissue starts to replace healthy liver tissue  The scar tissue prevents the liver from working properly  DISCHARGE INSTRUCTIONS:   Return to the emergency department if:   · You have pain during a bowel movement and it is black or contains blood  · You have a fast heart rate and fast breathing  · You are dizzy or confused  · You have severe pain in your abdomen  · You have trouble breathing  · Your vomit looks like it has coffee grinds or blood in it  Contact your healthcare provider if:   · You have a fever  · You have red or itchy skin  · You are in pain and feel weak  · You have questions or concerns about your condition or care  Medicines: You may need medicine to treat the cause of your cirrhosis  You may also need medicine to treat any health problems caused by cirrhosis  · Antiviral medicine  may be needed if your cirrhosis is caused by hepatitis  Antiviral medicine may prevent or decrease swelling and damage to your liver  · Blood pressure medicine  is used to treat high blood pressure in the portal vein (the vein that goes to your liver)  · Diuretics  decrease extra fluid that collects in a part of your body, such as your legs and abdomen  Diuretics can also decrease your blood pressure  You will urinate more often when you take this medicine  · Antibiotics  help prevent or treat a bacterial infection  · Take your medicine as directed  Contact your healthcare provider if you think your medicine is not helping or if you have side effects  Tell him or her if you are allergic to any medicine   Keep a list of the medicines, vitamins, and herbs you take  Include the amounts, and when and why you take them  Bring the list or the pill bottles to follow-up visits  Carry your medicine list with you in case of an emergency  Do not drink alcohol:  Alcohol will cause more damage to your liver  Manage cirrhosis:   · Do not smoke  Nicotine and other chemicals in cigarettes and cigars can cause blood vessel and lung damage  Ask your healthcare provider for information if you currently smoke and need help to quit  E-cigarettes or smokeless tobacco still contain nicotine  Talk to your healthcare provider before you use these products  · Eat a variety of healthy foods  Healthy foods include fruits, vegetables, whole-grain breads, low-fat dairy products, beans, lean meat, and fish  Ask if you need to be on a special diet  · Reach or maintain a healthy weight  You may develop fatty liver disease if you are overweight  Ask your healthcare provider for a healthy weight for you  He can help you create a safe weight loss plan if you are overweight  · Limit sodium (salt)  You may need to decrease the amount of sodium you eat if you have swelling caused by fluid buildup  Sodium is found in table salt and salty foods such as canned foods, frozen foods, and potato chips  · Drink liquids as directed  Ask how much liquid to drink each day and which liquids are best for you  For most people, good liquids to drink are water, juice, and milk  Liquids can help your liver work better  · Ask about vaccines  You may have a hard time fighting infection because of cirrhosis  Vaccines help protect you against viruses that can cause diseases such as the flu or hepatitis  Viral hepatitis is caused by a virus that leads to inflammation of the liver  You may need a hepatitis A or B vaccine  You may also need a pneumonia vaccine  Always get a flu vaccine each year as soon as it becomes available  · Ask about medicines  Some medicines can harm your liver  Acetaminophen is an example  Talk to your healthcare provider about all your medicines  Do not take any over-the-counter medicine or herbal supplements until your healthcare provider says it is okay  Follow up with your healthcare provider as directed:  Write down your questions so you remember to ask them during your visits  © Copyright [address] Information is for End User's use only and may not be sold, redistributed or otherwise used for commercial purposes  All illustrations and images included in CareNotes® are the copyrighted property of [employer]  or [facility] [place]   The above information is an  only  It is not intended as medical advice for individual conditions or treatments  Talk to your doctor, nurse or pharmacist before following any medical regimen to see if it is safe and effective for you

## 2021-05-07 ENCOUNTER — TELEPHONE (OUTPATIENT)
Dept: INTERNAL MEDICINE CLINIC | Facility: CLINIC | Age: 61
End: 2021-05-07

## 2021-05-07 NOTE — TELEPHONE ENCOUNTER
Patient was a no show today I called patient to r/s I left a voicemail for a call back  Zahra Donahue

## 2021-09-30 DIAGNOSIS — K21.9 GASTROESOPHAGEAL REFLUX DISEASE, UNSPECIFIED WHETHER ESOPHAGITIS PRESENT: ICD-10-CM

## 2021-09-30 DIAGNOSIS — K92.0 HEMATEMESIS: ICD-10-CM

## 2021-09-30 RX ORDER — PANTOPRAZOLE SODIUM 40 MG/1
TABLET, DELAYED RELEASE ORAL
Qty: 30 TABLET | Refills: 2 | OUTPATIENT
Start: 2021-09-30

## 2021-10-13 ENCOUNTER — OFFICE VISIT (OUTPATIENT)
Dept: INTERNAL MEDICINE CLINIC | Facility: CLINIC | Age: 61
End: 2021-10-13

## 2021-10-13 VITALS
SYSTOLIC BLOOD PRESSURE: 148 MMHG | WEIGHT: 171.6 LBS | HEART RATE: 75 BPM | OXYGEN SATURATION: 98 % | HEIGHT: 72 IN | BODY MASS INDEX: 23.24 KG/M2 | TEMPERATURE: 97.6 F | DIASTOLIC BLOOD PRESSURE: 80 MMHG

## 2021-10-13 DIAGNOSIS — M25.562 CHRONIC PAIN OF LEFT KNEE: Primary | ICD-10-CM

## 2021-10-13 DIAGNOSIS — R06.00 DYSPNEA ON EXERTION: ICD-10-CM

## 2021-10-13 DIAGNOSIS — I85.00 VARICES, ESOPHAGEAL (HCC): ICD-10-CM

## 2021-10-13 DIAGNOSIS — K92.0 HEMATEMESIS: ICD-10-CM

## 2021-10-13 DIAGNOSIS — G89.29 CHRONIC PAIN OF LEFT KNEE: Primary | ICD-10-CM

## 2021-10-13 DIAGNOSIS — K21.9 GASTROESOPHAGEAL REFLUX DISEASE, UNSPECIFIED WHETHER ESOPHAGITIS PRESENT: ICD-10-CM

## 2021-10-13 DIAGNOSIS — I10 ESSENTIAL HYPERTENSION: ICD-10-CM

## 2021-10-13 PROCEDURE — 99213 OFFICE O/P EST LOW 20 MIN: CPT | Performed by: INTERNAL MEDICINE

## 2021-10-13 RX ORDER — NADOLOL 20 MG/1
10 TABLET ORAL DAILY
Qty: 90 TABLET | Refills: 0 | Status: SHIPPED | OUTPATIENT
Start: 2021-10-13 | End: 2022-04-11

## 2021-10-13 RX ORDER — BLOOD PRESSURE TEST KIT
KIT MISCELLANEOUS DAILY
Qty: 1 KIT | Refills: 0 | Status: SHIPPED | OUTPATIENT
Start: 2021-10-13

## 2021-10-13 RX ORDER — LISINOPRIL 20 MG/1
40 TABLET ORAL DAILY
Qty: 90 TABLET | Refills: 3 | Status: SHIPPED | OUTPATIENT
Start: 2021-10-13 | End: 2022-05-26

## 2021-10-13 RX ORDER — PANTOPRAZOLE SODIUM 40 MG/1
40 TABLET, DELAYED RELEASE ORAL DAILY
Qty: 30 TABLET | Refills: 2 | Status: SHIPPED | OUTPATIENT
Start: 2021-10-13 | End: 2022-01-07

## 2021-10-13 RX ORDER — ALBUTEROL SULFATE 90 UG/1
2 AEROSOL, METERED RESPIRATORY (INHALATION) EVERY 6 HOURS PRN
Qty: 8 G | Refills: 2 | Status: SHIPPED | OUTPATIENT
Start: 2021-10-13 | End: 2022-01-07

## 2021-10-20 ENCOUNTER — TELEPHONE (OUTPATIENT)
Dept: INTERNAL MEDICINE CLINIC | Facility: CLINIC | Age: 61
End: 2021-10-20

## 2021-10-20 ENCOUNTER — CLINICAL SUPPORT (OUTPATIENT)
Dept: INTERNAL MEDICINE CLINIC | Facility: CLINIC | Age: 61
End: 2021-10-20

## 2021-10-20 DIAGNOSIS — R06.00 DYSPNEA ON EXERTION: Primary | ICD-10-CM

## 2021-10-20 PROCEDURE — 94060 EVALUATION OF WHEEZING: CPT | Performed by: INTERNAL MEDICINE

## 2021-10-20 RX ORDER — ALBUTEROL SULFATE 2.5 MG/3ML
2.5 SOLUTION RESPIRATORY (INHALATION) ONCE
Status: COMPLETED | OUTPATIENT
Start: 2021-10-20 | End: 2021-10-20

## 2021-10-20 RX ADMIN — ALBUTEROL SULFATE 2.5 MG: 2.5 SOLUTION RESPIRATORY (INHALATION) at 09:56

## 2021-11-12 ENCOUNTER — VBI (OUTPATIENT)
Dept: ADMINISTRATIVE | Facility: OTHER | Age: 61
End: 2021-11-12

## 2022-03-24 ENCOUNTER — TELEPHONE (OUTPATIENT)
Dept: INTERNAL MEDICINE CLINIC | Facility: CLINIC | Age: 62
End: 2022-03-24

## 2022-03-24 NOTE — TELEPHONE ENCOUNTER
Folder Color- Red    Name of Form- Physician's Statement     Form to be filled out by- Dr Hanley     Form to be picked up by pt  Patient made aware of 10 business day policy

## 2022-03-30 NOTE — TELEPHONE ENCOUNTER
Patient called back I explained to him that the provider could not fill this out  He asked why I told him because he is not permanently disabled  He states that he is he gets a check from [facility] monthly and this form is for him to fill out his renters rebate he lives in [facility] [address] in [place]  If you need more clarification please call the patient  I will put form back in Red clinical folder

## 2022-04-04 NOTE — TELEPHONE ENCOUNTER
Per Dr Rosie Stokes there is no documented need for this  No diagnosis in chart anywhere stating what his disability is   I will reach out to patient to see if he is able to get a copy of a previous form for comparison

## 2022-04-15 ENCOUNTER — TELEPHONE (OUTPATIENT)
Dept: INTERNAL MEDICINE CLINIC | Facility: CLINIC | Age: 62
End: 2022-04-15

## 2022-04-15 NOTE — TELEPHONE ENCOUNTER
Patient message:      I am checking to see if the papers for the renters rebate have been finished  Please call me back at [phone]      Thank you,  Israel Mcgee

## 2022-04-15 NOTE — TELEPHONE ENCOUNTER
Called patient in regards to paperwork  Patient stated that he doesn't need the paperwork completed, that he went to [place] [employer] and got a print out  Form will be removed from folder and shredded  98

## 2022-04-15 NOTE — TELEPHONE ENCOUNTER
Called patient in regards to paperwork  Patient stated that he doesn't need the paperwork completed, that he went to [place] [employer] [place] and got a print out  Form will be removed from folder and shredded

## 2022-05-26 DIAGNOSIS — I10 ESSENTIAL HYPERTENSION: ICD-10-CM

## 2022-05-26 RX ORDER — LISINOPRIL 20 MG/1
TABLET ORAL
Qty: 90 TABLET | Refills: 3 | Status: SHIPPED | OUTPATIENT
Start: 2022-05-26

## 2022-06-15 ENCOUNTER — TELEPHONE (OUTPATIENT)
Dept: INTERNAL MEDICINE CLINIC | Facility: CLINIC | Age: 62
End: 2022-06-15

## 2022-08-15 ENCOUNTER — TELEPHONE (OUTPATIENT)
Dept: OTHER | Facility: OTHER | Age: 62
End: 2022-08-15

## 2022-09-14 ENCOUNTER — CONSULT (OUTPATIENT)
Dept: GASTROENTEROLOGY | Facility: CLINIC | Age: 62
End: 2022-09-14
Payer: MEDICARE

## 2022-09-14 VITALS
TEMPERATURE: 98.1 F | SYSTOLIC BLOOD PRESSURE: 143 MMHG | BODY MASS INDEX: 22.62 KG/M2 | HEART RATE: 88 BPM | HEIGHT: 72 IN | WEIGHT: 167 LBS | DIASTOLIC BLOOD PRESSURE: 92 MMHG

## 2022-09-14 DIAGNOSIS — B18.2 CHRONIC HEPATITIS C WITHOUT HEPATIC COMA (HCC): ICD-10-CM

## 2022-09-14 DIAGNOSIS — I85.00 ESOPHAGEAL VARICES WITHOUT BLEEDING, UNSPECIFIED ESOPHAGEAL VARICES TYPE (HCC): ICD-10-CM

## 2022-09-14 DIAGNOSIS — Z12.11 SCREENING FOR COLON CANCER: ICD-10-CM

## 2022-09-14 DIAGNOSIS — K70.31 ALCOHOLIC CIRRHOSIS OF LIVER WITH ASCITES (HCC): Primary | ICD-10-CM

## 2022-09-14 PROCEDURE — 99205 OFFICE O/P NEW HI 60 MIN: CPT | Performed by: INTERNAL MEDICINE

## 2022-09-14 NOTE — PROGRESS NOTES
[facility] - Outpatient Consultation  Loan Hui 65 y o  male MRN: [id number]  Encounter: [id number]        ASSESSMENT AND PLAN   Patient is a 65year old male with a past medical history of GERD, HTN, Alcoholic & untreated hepatitis C induced cirrhosis of the liver who presents for his cirrhosis and colon cancer screening    1  Alcoholic cirrhosis of liver with ascites (HCC)  · Cirrhosis: Pt is being evaluated for a history of cirrhosis  · Etiology: Hepatitis C (HCV) and Alcohol, he quit drinking 4 years ago  · Stage: 2 (+) non-bleeding varices, (-) ascites   · Compensated: Yes  · MELD:Labs not available, ordered  ·  [address]  Screening up to date: USG ordered to rule out [address] , needs to be done every 6 months   · Variceal screening up to date: no, repeat EGD ordered  · Varices intervention: Was on nadolol for small varices, currently not taking, will decide after next EGD  · Ascites intervention: No ascites on clinical exam  · Hepatic Encephalopathy intervention:No signs of encephalopathy  · Transplant candidacy: Will be addressed after repeat MELD labs at follow-up  - Hepatitis C RNA, quantitative, PCR; Future  - Hepatitis C genotype; Future  - Hepatitis B surface antigen; Future  - Comprehensive metabolic panel; Future  - US right upper quadrant with liver dopplers; Future  - Protime-INR; Future  - CBC and differential; Future    2  Chronic hepatitis C without hepatic coma ([address] )  · Obtain repeat labs and ultrasound  · Will message nurse coordinator to initiate treatment after labs   - Hepatitis C RNA, quantitative, PCR; Future  - Hepatitis C genotype; Future  - Hepatitis B surface antigen; Future  - Comprehensive metabolic panel; Future  - US right upper quadrant with liver dopplers; Future  - Protime-INR; Future  - CBC and differential; Future    3  Screening for colon cancer  · Will plan for colonoscopy   - Colonoscopy;  Future    Orders Placed This Encounter   Procedures    US right upper quadrant with liver dopplers    Hepatitis C RNA, quantitative, PCR    Hepatitis C genotype    Hepatitis B surface antigen    Comprehensive metabolic panel    Protime-INR    CBC and differential    EGD    Colonoscopy         HISTORY OF PRESENT ILLNESS     Patient is a 65year old male with a past medical history of cirrhosis that is secondary to both alcohol and untreated hepatitis-C infection  Patient's GI history is also significant for upper GI bleeding secondary to Trena-Khalil tear back in 2018  During the endoscopy at that time he was noted to have small esophageal varices as well  Currently patient's cirrhosis appears to be well compensated because he does not have any history of ascites, encephalopathy, variceal bleeding or hepatocellular carcinoma  He is interested in having his hepatitis C treated  He is denying any symptoms today including nausea, vomiting, heartburn, blood in stools, constipation or diarrhea  He is feeling well overall  Patient has quit drinking alcohol about 4 years ago when he was diagnosed with cirrhosis  He does smoke cigarettes  REVIEW OF SYSTEMS     CONSTITUTIONAL: Denies any fever, chills, rigors, and weight loss  HEENT: No earache or tinnitus  Denies hearing loss or visual disturbances  CARDIOVASCULAR: No chest pain or palpitations  RESPIRATORY: Denies any cough, hemoptysis, shortness of breath or dyspnea on exertion  GASTROINTESTINAL: As noted in the History of Present Illness  GENITOURINARY: No problems with urination  Denies any hematuria or dysuria  NEUROLOGIC: No dizziness or vertigo, denies headaches  MUSCULOSKELETAL: Denies any muscle or joint pain  SKIN: Denies skin rashes or itching  ENDOCRINE: Denies excessive thirst  Denies intolerance to heat or cold  PSYCHOSOCIAL: Denies depression or anxiety  Denies any recent memory loss         Historical information     Past Medical History:   Diagnosis Date    Arthritis     Back pain, chronic     [address] [facility] [address] )     Hepatitis     C    Hepatitis C     Lyme disease      Past Surgical History:   Procedure Laterality Date    ESOPHAGOGASTRODUODENOSCOPY N/A 3/8/2018    Procedure: ESOPHAGOGASTRODUODENOSCOPY (EGD); Surgeon: oD Loomis MD;  [facility]; Service: Gastroenterology    WRIST SURGERY Right     8/8/88     Social History   Social History     Substance and Sexual Activity   Alcohol Use Not Currently    Comment: stoped two months ago     Social History     Substance and Sexual Activity   Drug Use No    Comment: Hx of drug abuse     Social History     Tobacco Use   Smoking Status Current Every Day Smoker    Packs/day: 1 00    Years: 36 00    Pack years: 36 00    Types: Cigarettes   Smokeless Tobacco Never Used     Family History   Problem Relation Age of Onset    Colon cancer Mother        Allergies       Current Outpatient Medications:     albuterol (PROVENTIL HFA,VENTOLIN HFA) 90 mcg/act inhaler    Blood Pressure KIT    lisinopril (ZESTRIL) 20 mg tablet    pantoprazole (PROTONIX) 40 mg tablet    nadolol (CORGARD) 20 mg tablet    Allergies   Allergen Reactions    Bee Venom Swelling    Codeine            Objective assessment       Blood pressure 143/92, pulse 88, temperature 98 1 °F (36 7 °C), temperature source Tympanic, height 6' (1 829 m), weight 75 8 kg (167 lb)  Body mass index is 22 65 kg/m²  PHYSICAL EXAM:         General Appearance:   Alert, cooperative, no distress   HEENT:   Normocephalic, atraumatic, anicteric  Neck:  Supple, symmetrical, trachea midline   Lungs:   Clear to auscultation bilaterally; no rales, rhonchi or wheezing; respirations unlabored    Heart[de-identified]   Regular rate and rhythm; no murmur, rub, or gallop     Abdomen:   Soft, non-tender, non-distended; normal bowel sounds; no masses, no organomegaly    Genitalia:   Deferred    Rectal:   Deferred    Extremities:  No cyanosis, clubbing or edema    Pulses:  2+ and symmetric Skin:  No jaundice, rashes, or lesions    Lymph nodes:  No palpable cervical lymphadenopathy        Lab Results:      No visits with results within 1 Day(s) from this visit  Latest known visit with results is:   Admission on 07/28/2019, Discharged on 07/28/2019   Component Date Value    WBC 07/28/2019 5 14     RBC 07/28/2019 4 08     Hemoglobin 07/28/2019 10 2 (A)    Hematocrit 07/28/2019 33 0 (A)    MCV 07/28/2019 81 (A)    MCH 07/28/2019 25 0 (A)    MCHC 07/28/2019 30 9 (A)    RDW 07/28/2019 20 3 (A)    MPV 07/28/2019 9 9     Platelets 54/98/8015 130 (A)    nRBC 07/28/2019 0     Neutrophils Relative 07/28/2019 41 (A)    Immat GRANS % 07/28/2019 0     Lymphocytes Relative 07/28/2019 37     Monocytes Relative 07/28/2019 17 (A)    Eosinophils Relative 07/28/2019 4     Basophils Relative 07/28/2019 1     Neutrophils Absolute 07/28/2019 2 17     Immature Grans Absolute 07/28/2019 0 01     Lymphocytes Absolute 07/28/2019 1 88     Monocytes Absolute 07/28/2019 0 86     Eosinophils Absolute 07/28/2019 0 18     Basophils Absolute 07/28/2019 0 04     Sodium 07/28/2019 138     Potassium 07/28/2019 3 6     Chloride 07/28/2019 107     CO2 07/28/2019 25     ANION GAP 07/28/2019 6     BUN 07/28/2019 11     Creatinine 07/28/2019 0 96     Glucose 07/28/2019 103     Calcium 07/28/2019 9 3     AST 07/28/2019 100 (A)    ALT 07/28/2019 64     Alkaline Phosphatase 07/28/2019 106     Total Protein 07/28/2019 7 8     Albumin 07/28/2019 3 0 (A)    Total Bilirubin 07/28/2019 0 69     eGFR 07/28/2019 87     Blood Culture 07/28/2019 No Growth After 5 Days   Blood Culture 07/28/2019 No Growth After 5 Days   LACTIC ACID 07/28/2019 1 9          Radiology Results:      No results found        Nela Barton MD  Gastroenterology Fellow

## 2022-09-14 NOTE — PATIENT INSTRUCTIONS
Scheduled date of colonoscopy/egd  (as of today):12/1/22  Physician performing colonoscopy: Dr Jasmine gNuyen  Location of colonoscopy: [facility]   Bowel prep reviewed with patient: golytely/dulcolax  Instructions reviewed with patient by: Mavis Al  Clearances:   n/a

## 2022-09-16 ENCOUNTER — TELEPHONE (OUTPATIENT)
Dept: GASTROENTEROLOGY | Facility: CLINIC | Age: 62
End: 2022-09-16

## 2022-09-16 DIAGNOSIS — B18.2 CHRONIC HEPATITIS C WITHOUT HEPATIC COMA (HCC): Primary | ICD-10-CM

## 2022-09-16 NOTE — TELEPHONE ENCOUNTER
----- Message from Meera Al MD sent at 9/16/2022 12:48 PM EDT -----  Hey good afternoon,     Patient needs treatment for hep c  I ordered labs       Meera Al MD  Gastroenterology Fellow

## 2022-09-18 ENCOUNTER — HOSPITAL ENCOUNTER (OUTPATIENT)
Dept: RADIOLOGY | Facility: HOSPITAL | Age: 62
Discharge: HOME | End: 2022-09-18
Payer: MEDICARE

## 2022-09-18 DIAGNOSIS — K70.31 ALCOHOLIC CIRRHOSIS OF LIVER WITH ASCITES (HCC): ICD-10-CM

## 2022-09-18 DIAGNOSIS — B18.2 CHRONIC HEPATITIS C WITHOUT HEPATIC COMA (HCC): ICD-10-CM

## 2022-09-18 PROCEDURE — 76705 ECHO EXAM OF ABDOMEN: CPT

## 2022-09-21 NOTE — TELEPHONE ENCOUNTER
Patient will complete remainder of required blood work for hep c treatment  Discussed treatment options and medication authorization process

## 2022-09-23 ENCOUNTER — APPOINTMENT (OUTPATIENT)
Dept: LAB | Facility: CLINIC | Age: 62
End: 2022-09-23
Payer: MEDICARE

## 2022-09-23 DIAGNOSIS — B18.2 CHRONIC HEPATITIS C WITH HEPATIC COMA (HCC): ICD-10-CM

## 2022-09-23 DIAGNOSIS — B18.2 CHRONIC HEPATITIS C WITHOUT HEPATIC COMA (HCC): ICD-10-CM

## 2022-09-23 DIAGNOSIS — K70.31 ALCOHOLIC CIRRHOSIS OF LIVER WITH ASCITES (HCC): ICD-10-CM

## 2022-09-23 LAB
ALBUMIN SERPL BCP-MCNC: 3.1 G/DL (ref 3.5–5)
ALP SERPL-CCNC: 126 U/L (ref 46–116)
ALT SERPL W P-5'-P-CCNC: 69 U/L (ref 12–78)
ANION GAP SERPL CALCULATED.3IONS-SCNC: 7 MMOL/L (ref 4–13)
AST SERPL W P-5'-P-CCNC: 132 U/L (ref 5–45)
BASOPHILS # BLD AUTO: 0.09 THOUSANDS/ΜL (ref 0–0.1)
BASOPHILS NFR BLD AUTO: 2 % (ref 0–1)
BILIRUB SERPL-MCNC: 0.97 MG/DL (ref 0.2–1)
BUN SERPL-MCNC: 13 MG/DL (ref 5–25)
CALCIUM ALBUM COR SERPL-MCNC: 9.8 MG/DL (ref 8.3–10.1)
CALCIUM SERPL-MCNC: 9.1 MG/DL (ref 8.3–10.1)
CHLORIDE SERPL-SCNC: 109 MMOL/L (ref 96–108)
CO2 SERPL-SCNC: 22 MMOL/L (ref 21–32)
CREAT SERPL-MCNC: 0.88 MG/DL (ref 0.6–1.3)
EOSINOPHIL # BLD AUTO: 0.19 THOUSAND/ΜL (ref 0–0.61)
EOSINOPHIL NFR BLD AUTO: 4 % (ref 0–6)
ERYTHROCYTE [DISTWIDTH] IN BLOOD BY AUTOMATED COUNT: 15.7 % (ref 11.6–15.1)
GFR SERPL CREATININE-BSD FRML MDRD: 92 ML/MIN/1.73SQ M
GLUCOSE P FAST SERPL-MCNC: 94 MG/DL (ref 65–99)
HAV AB SER QL IA: REACTIVE
HBV SURFACE AB SER-ACNC: <3.1 MIU/ML
HBV SURFACE AG SER QL: NORMAL
HCT VFR BLD AUTO: 41.2 % (ref 36.5–49.3)
HGB BLD-MCNC: 13.8 G/DL (ref 12–17)
IMM GRANULOCYTES # BLD AUTO: 0.01 THOUSAND/UL (ref 0–0.2)
IMM GRANULOCYTES NFR BLD AUTO: 0 % (ref 0–2)
INR PPP: 1.03 (ref 0.84–1.19)
LYMPHOCYTES # BLD AUTO: 1.19 THOUSANDS/ΜL (ref 0.6–4.47)
LYMPHOCYTES NFR BLD AUTO: 25 % (ref 14–44)
MCH RBC QN AUTO: 32.4 PG (ref 26.8–34.3)
MCHC RBC AUTO-ENTMCNC: 33.5 G/DL (ref 31.4–37.4)
MCV RBC AUTO: 97 FL (ref 82–98)
MONOCYTES # BLD AUTO: 0.71 THOUSAND/ΜL (ref 0.17–1.22)
MONOCYTES NFR BLD AUTO: 15 % (ref 4–12)
NEUTROPHILS # BLD AUTO: 2.62 THOUSANDS/ΜL (ref 1.85–7.62)
NEUTS SEG NFR BLD AUTO: 54 % (ref 43–75)
NRBC BLD AUTO-RTO: 0 /100 WBCS
PLATELET # BLD AUTO: 126 THOUSANDS/UL (ref 149–390)
PMV BLD AUTO: 10.7 FL (ref 8.9–12.7)
POTASSIUM SERPL-SCNC: 3.6 MMOL/L (ref 3.5–5.3)
PROT SERPL-MCNC: 8.3 G/DL (ref 6.4–8.4)
PROTHROMBIN TIME: 13.7 SECONDS (ref 11.6–14.5)
RBC # BLD AUTO: 4.26 MILLION/UL (ref 3.88–5.62)
SODIUM SERPL-SCNC: 138 MMOL/L (ref 135–147)
WBC # BLD AUTO: 4.81 THOUSAND/UL (ref 4.31–10.16)

## 2022-09-23 PROCEDURE — 86708 HEPATITIS A ANTIBODY: CPT

## 2022-09-23 PROCEDURE — 85610 PROTHROMBIN TIME: CPT

## 2022-09-23 PROCEDURE — 85025 COMPLETE CBC W/AUTO DIFF WBC: CPT

## 2022-09-23 PROCEDURE — 86706 HEP B SURFACE ANTIBODY: CPT

## 2022-09-23 PROCEDURE — 80053 COMPREHEN METABOLIC PANEL: CPT

## 2022-09-23 PROCEDURE — 87902 NFCT AGT GNTYP ALYS HEP C: CPT

## 2022-09-23 PROCEDURE — 36415 COLL VENOUS BLD VENIPUNCTURE: CPT

## 2022-09-23 PROCEDURE — 84460 ALANINE AMINO (ALT) (SGPT): CPT

## 2022-09-23 PROCEDURE — 87522 HEPATITIS C REVRS TRNSCRPJ: CPT

## 2022-09-23 PROCEDURE — 87389 HIV-1 AG W/HIV-1&-2 AB AG IA: CPT

## 2022-09-23 PROCEDURE — 83010 ASSAY OF HAPTOGLOBIN QUANT: CPT

## 2022-09-23 PROCEDURE — 82977 ASSAY OF GGT: CPT

## 2022-09-23 PROCEDURE — 82247 BILIRUBIN TOTAL: CPT

## 2022-09-23 PROCEDURE — 82172 ASSAY OF APOLIPOPROTEIN: CPT

## 2022-09-23 PROCEDURE — 86704 HEP B CORE ANTIBODY TOTAL: CPT

## 2022-09-23 PROCEDURE — 83883 ASSAY NEPHELOMETRY NOT SPEC: CPT

## 2022-09-23 PROCEDURE — 87340 HEPATITIS B SURFACE AG IA: CPT

## 2022-09-25 LAB
HCV GENTYP SERPL NAA+PROBE: NORMAL
HCV PLEASE NOTE: NORMAL
HIV 1+2 AB+HIV1 P24 AG SERPL QL IA: NORMAL

## 2022-09-26 LAB
HCV RNA SERPL NAA+PROBE-ACNC: NORMAL IU/ML
HCV RNA SERPL NAA+PROBE-LOG IU: 6.03 LOG10 IU/ML
MISCELLANEOUS LAB TEST RESULT: NORMAL
TEST INFORMATION: NORMAL

## 2022-09-27 LAB
A2 MACROGLOB SERPL-MCNC: 420 MG/DL (ref 110–276)
ALT SERPL W P-5'-P-CCNC: 67 IU/L (ref 0–55)
APO A-I SERPL-MCNC: 81 MG/DL (ref 101–178)
BILIRUB SERPL-MCNC: 0.8 MG/DL (ref 0–1.2)
COMMENT: ABNORMAL
FIBROSIS SCORING:: ABNORMAL
FIBROSIS STAGE SERPL QL: ABNORMAL
GGT SERPL-CCNC: 79 IU/L (ref 0–65)
HAPTOGLOB SERPL-MCNC: 24 MG/DL (ref 32–363)
INTERPRETATIONS: ABNORMAL
LIVER FIBR SCORE SERPL CALC.FIBROSURE: 0.95 (ref 0–0.21)
NECROINFLAMM ACTIVITY SCORING:: ABNORMAL
NECROINFLAMMATORY ACT GRADE SERPL QL: ABNORMAL
NECROINFLAMMATORY ACT SCORE SERPL: 0.64 (ref 0–0.17)
SERVICE CMNT-IMP: ABNORMAL

## 2022-09-27 RX ORDER — VELPATASVIR AND SOFOSBUVIR 100; 400 MG/1; MG/1
1 TABLET, FILM COATED ORAL DAILY
Qty: 28 TABLET | Refills: 2 | Status: SHIPPED | OUTPATIENT
Start: 2022-09-27 | End: 2022-12-20

## 2022-09-27 NOTE — TELEPHONE ENCOUNTER
Epclusa 12 week script entered    Treatment  Naive  (Interferon)  Viral load [id number]  Genotype  1a  Fibrosure  0 95  F4  CIRRHOSIS COMPENSATED    HBV surf ab < 3 10  VACCINE RECOMMENDED    MELD 6  Child Martinez A

## 2022-10-03 NOTE — TELEPHONE ENCOUNTER
Epclusa approved  Attempted to contact patient to initiate education  Caller can not accept calls at this time

## 2022-10-10 NOTE — TELEPHONE ENCOUNTER
Patient received Epclusa delivery and started treatment 10/7/22  Education reviewed dose, missed dose, side effects and required blood work  Reminded of recommendation for hep b vaccine  He will follow up with PCP

## 2022-10-25 DIAGNOSIS — K21.9 GASTROESOPHAGEAL REFLUX DISEASE, UNSPECIFIED WHETHER ESOPHAGITIS PRESENT: ICD-10-CM

## 2022-10-25 DIAGNOSIS — I10 ESSENTIAL HYPERTENSION: ICD-10-CM

## 2022-10-25 DIAGNOSIS — K92.0 HEMATEMESIS: ICD-10-CM

## 2022-10-26 RX ORDER — LISINOPRIL 20 MG/1
40 TABLET ORAL DAILY
Qty: 90 TABLET | Refills: 3 | OUTPATIENT
Start: 2022-10-26

## 2022-10-26 RX ORDER — PANTOPRAZOLE SODIUM 40 MG/1
40 TABLET, DELAYED RELEASE ORAL DAILY
Qty: 90 TABLET | Refills: 3 | OUTPATIENT
Start: 2022-10-26

## 2022-11-01 DIAGNOSIS — B18.2 CHRONIC HEPATITIS C WITHOUT HEPATIC COMA (HCC): Primary | ICD-10-CM

## 2022-11-04 ENCOUNTER — TELEPHONE (OUTPATIENT)
Dept: GASTROENTEROLOGY | Facility: CLINIC | Age: 62
End: 2022-11-04

## 2022-11-04 NOTE — TELEPHONE ENCOUNTER
----- Message from Adrianna Zuluaga RN sent at 10/10/2022 11:34 AM EDT -----  Regardin week  4 week      Called pt for labs due reminder, left message to call if needs a script

## 2022-11-14 ENCOUNTER — APPOINTMENT (OUTPATIENT)
Dept: LAB | Facility: CLINIC | Age: 62
End: 2022-11-14

## 2022-11-14 ENCOUNTER — TELEPHONE (OUTPATIENT)
Dept: GASTROENTEROLOGY | Facility: CLINIC | Age: 62
End: 2022-11-14

## 2022-11-14 DIAGNOSIS — B18.2 CHRONIC HEPATITIS C WITHOUT HEPATIC COMA (HCC): ICD-10-CM

## 2022-11-14 LAB
ALBUMIN SERPL BCP-MCNC: 3 G/DL (ref 3.5–5)
ALP SERPL-CCNC: 103 U/L (ref 46–116)
ALT SERPL W P-5'-P-CCNC: 24 U/L (ref 12–78)
ANION GAP SERPL CALCULATED.3IONS-SCNC: 7 MMOL/L (ref 4–13)
AST SERPL W P-5'-P-CCNC: 31 U/L (ref 5–45)
BASOPHILS # BLD AUTO: 0.07 THOUSANDS/ÂΜL (ref 0–0.1)
BASOPHILS NFR BLD AUTO: 1 % (ref 0–1)
BILIRUB SERPL-MCNC: 0.92 MG/DL (ref 0.2–1)
BUN SERPL-MCNC: 12 MG/DL (ref 5–25)
CALCIUM ALBUM COR SERPL-MCNC: 10.8 MG/DL (ref 8.3–10.1)
CALCIUM SERPL-MCNC: 10 MG/DL (ref 8.3–10.1)
CHLORIDE SERPL-SCNC: 112 MMOL/L (ref 96–108)
CO2 SERPL-SCNC: 22 MMOL/L (ref 21–32)
CREAT SERPL-MCNC: 0.86 MG/DL (ref 0.6–1.3)
EOSINOPHIL # BLD AUTO: 0.24 THOUSAND/ÂΜL (ref 0–0.61)
EOSINOPHIL NFR BLD AUTO: 5 % (ref 0–6)
ERYTHROCYTE [DISTWIDTH] IN BLOOD BY AUTOMATED COUNT: 13.7 % (ref 11.6–15.1)
GFR SERPL CREATININE-BSD FRML MDRD: 93 ML/MIN/1.73SQ M
GLUCOSE P FAST SERPL-MCNC: 102 MG/DL (ref 65–99)
HCT VFR BLD AUTO: 39.7 % (ref 36.5–49.3)
HGB BLD-MCNC: 12.9 G/DL (ref 12–17)
IMM GRANULOCYTES # BLD AUTO: 0.01 THOUSAND/UL (ref 0–0.2)
IMM GRANULOCYTES NFR BLD AUTO: 0 % (ref 0–2)
LYMPHOCYTES # BLD AUTO: 1.42 THOUSANDS/ÂΜL (ref 0.6–4.47)
LYMPHOCYTES NFR BLD AUTO: 28 % (ref 14–44)
MCH RBC QN AUTO: 31.2 PG (ref 26.8–34.3)
MCHC RBC AUTO-ENTMCNC: 32.5 G/DL (ref 31.4–37.4)
MCV RBC AUTO: 96 FL (ref 82–98)
MONOCYTES # BLD AUTO: 0.72 THOUSAND/ÂΜL (ref 0.17–1.22)
MONOCYTES NFR BLD AUTO: 14 % (ref 4–12)
NEUTROPHILS # BLD AUTO: 2.66 THOUSANDS/ÂΜL (ref 1.85–7.62)
NEUTS SEG NFR BLD AUTO: 52 % (ref 43–75)
NRBC BLD AUTO-RTO: 0 /100 WBCS
PLATELET # BLD AUTO: 130 THOUSANDS/UL (ref 149–390)
PMV BLD AUTO: 11 FL (ref 8.9–12.7)
POTASSIUM SERPL-SCNC: 3.5 MMOL/L (ref 3.5–5.3)
PROT SERPL-MCNC: 8.2 G/DL (ref 6.4–8.4)
RBC # BLD AUTO: 4.14 MILLION/UL (ref 3.88–5.62)
SODIUM SERPL-SCNC: 141 MMOL/L (ref 135–147)
WBC # BLD AUTO: 5.12 THOUSAND/UL (ref 4.31–10.16)

## 2022-11-16 LAB
HCV RNA SERPL NAA+PROBE-ACNC: NORMAL IU/ML
TEST INFORMATION: NORMAL

## 2022-11-16 NOTE — TELEPHONE ENCOUNTER
Patient returning missed phone call in regards to his labs  Patient would like a call back to discuss

## 2022-11-21 ENCOUNTER — TELEPHONE (OUTPATIENT)
Dept: GASTROENTEROLOGY | Facility: MEDICAL CENTER | Age: 62
End: 2022-11-21

## 2022-11-21 NOTE — TELEPHONE ENCOUNTER
Called patient to confirm his upcoming procedure  Patient states he has the prep instructions but no prep      Please sent Golytely to patients pharmacy for upcoming procedure

## 2022-11-28 ENCOUNTER — TELEPHONE (OUTPATIENT)
Dept: GASTROENTEROLOGY | Facility: CLINIC | Age: 62
End: 2022-11-28

## 2022-11-28 NOTE — TELEPHONE ENCOUNTER
Pt called to cancel colon and EGD  Pt has to travel out of town due to death in family  Pt will call back to reschedule

## 2022-12-09 ENCOUNTER — OFFICE VISIT (OUTPATIENT)
Dept: INTERNAL MEDICINE CLINIC | Facility: CLINIC | Age: 62
End: 2022-12-09

## 2022-12-09 VITALS
HEART RATE: 89 BPM | BODY MASS INDEX: 25.06 KG/M2 | HEIGHT: 72 IN | WEIGHT: 185 LBS | DIASTOLIC BLOOD PRESSURE: 109 MMHG | SYSTOLIC BLOOD PRESSURE: 158 MMHG | TEMPERATURE: 98.3 F

## 2022-12-09 DIAGNOSIS — B18.2 CHRONIC HEPATITIS C WITHOUT HEPATIC COMA (HCC): Primary | Chronic | ICD-10-CM

## 2022-12-09 DIAGNOSIS — Z23 NEED FOR TDAP VACCINATION: ICD-10-CM

## 2022-12-09 DIAGNOSIS — Z23 NEED FOR HEPATITIS B VACCINATION: ICD-10-CM

## 2022-12-09 DIAGNOSIS — I10 ESSENTIAL HYPERTENSION: ICD-10-CM

## 2022-12-09 DIAGNOSIS — K70.31 ALCOHOLIC CIRRHOSIS OF LIVER WITH ASCITES (HCC): Chronic | ICD-10-CM

## 2022-12-09 DIAGNOSIS — Z23 NEED FOR INFLUENZA VACCINATION: ICD-10-CM

## 2022-12-09 DIAGNOSIS — I85.00 VARICES, ESOPHAGEAL (HCC): ICD-10-CM

## 2022-12-09 DIAGNOSIS — F17.210 CIGARETTE NICOTINE DEPENDENCE WITHOUT COMPLICATION: Chronic | ICD-10-CM

## 2022-12-09 RX ORDER — NADOLOL 20 MG/1
10 TABLET ORAL DAILY
Qty: 90 TABLET | Refills: 0 | Status: SHIPPED | OUTPATIENT
Start: 2022-12-09 | End: 2023-06-07

## 2022-12-09 NOTE — PROGRESS NOTES
Clinic Visit Note  Skylar Peters 65 y o  male   MRN: [id number]    Assessment and Plan      Diagnoses and all orders for this visit:    Chronic hepatitis C without hepatic coma [facility])  Patient currently undergoing treatment with Epclusa  Following with GI  Hepatitis C RNA now undetectable  Alcoholic cirrhosis of liver with ascites ([facility] [address] )  Patient is no longer drinking  Following with GI  Meld score of 7  Patient stopped nadolol for unknown reasons  Will restart  Patient with an upcoming EGD and colonoscopy  Essential hypertension  Patient with elevated blood pressures today  Systolics initially 412, came down to 150s  Patient states that he smoked a few cigarettes prior to coming into the office  States that he has a blood pressure machine at home and normally runs in the 527I to 273G systolic  At this time, we will hold off on changing his antihypertensive regiment given that he does take his blood pressure at home  Patient was given logs, and told to track of his blood pressures  Will return in 3 months with filled out logs  Additionally, patient has not had a lipid panel in almost 5 years, ordered  -     Lipid Panel with Direct LDL reflex; Future    Need for hepatitis B vaccination  Second dose given today  -     HEPATITIS B VACCINE ADULT IM    Need for influenza vaccination  Patient agreeable to vaccination  -     influenza vaccine, quadrivalent, recombinant, PF, 0 5 mL, for patients 18 yr+ (FLUBLOK)    Need for Tdap vaccination  Patient agreeable to vaccination   -     TDAP VACCINE GREATER THAN OR EQUAL TO 8YO IM    Cigarette nicotine dependence without complication  Patient has a 40+ pack year history  Requires CT lung cancer screening  Patient was offered medications and nicotine patches, but states he wants to quit on his own  -     CT lung screening program; Future    Varices, esophageal ([facility] )  Upcoming EGD  Nadolol refilled  -     nadolol (CORGARD) 20 mg tablet;  Take 0 5 tablets (10 mg total) by mouth daily          Health Maintenance:  PHQ-2/9 Depression Screening    Little interest or pleasure in doing things: 0 - not at all  Feeling down, depressed, or hopeless: 0 - not at all  PHQ-2 Score: 0  PHQ-2 Interpretation: Negative depression screen      The ASCVD Risk score (Ariane CAUSEY, et al , 2019) failed to calculate for the following reasons:    Cannot find a previous HDL lab    Cannot find a previous total cholesterol labNo results found for: HGBA1C   Lab Results   Component Value Date    HEPCAB High Reactive (A) 03/09/2018      Most Recent Immunizations   Administered Date(s) Administered   • Hep A, adult 03/22/2021   • Hep B, adult 03/22/2021   • INFLUENZA 03/10/2018   • Influenza Quadrivalent Preservative Free 3 years and older IM 03/10/2018   • Influenza, seasonal, injectable, preservative free 02/01/2017   • Pneumococcal Polysaccharide PPV23 03/22/2021    Not on file 03/08/2018 No components found for: HIV1X2     HCV + undergoing treatment  HIV negative  C scope schedule  CT lung cancer screening ordered  AAA screening when 66  CBC and CMP in 2022  Lipids ordered  Not drinking  Working on quitting smoking  Second dose of hepatitis B vaccine given today, along with flu and Tdap  Will discuss zoster vaccine at next appointment  Will order A1c    BMI Counseling: Body mass index is 25 09 kg/m²  The BMI is above normal  Nutrition recommendations include decreasing portion sizes, encouraging healthy choices of fruits and vegetables, decreasing fast food intake, consuming healthier snacks, limiting drinks that contain sugar, moderation in carbohydrate intake, increasing intake of lean protein, reducing intake of saturated and trans fat and reducing intake of cholesterol  Rationale for BMI follow-up plan is due to patient being overweight or obese  Depression Screening and Follow-up Plan: Patient was screened for depression during today's encounter   They screened negative with a PHQ-2 score of 0  Tobacco Cessation Counseling: Tobacco cessation counseling was provided  The patient is sincerely urged to quit consumption of tobacco  He is not ready to quit tobacco  Medication options and side effects of medication discussed  Patient refused medication  Lung Cancer Screening Shared Decision Making: I discussed with him that he is a candidate for lung cancer CT screening  The following Shared Decision-Making points were covered:  1  Benefits of screening were discussed, including the rates of reduction in death from lung cancer and other causes  Harms of screening were reviewed, including false positive tests, radiation exposure levels, risks of invasive procedures, risks of complications of screening, and risk of overdiagnosis  2  I counseled on the importance of adherence to annual lung cancer LDCT screening, impact of co-morbidities, and ability or willingness to undergo diagnosis and treatment  3  I counseled on the importance of maintaining abstinence as a former smoker or was counseled on the importance of smoking cessation if a current smoker    Review of Eligibility Criteria: He meets all of the criteria for Lung Cancer Screening    - He is 65 y o    - He has 20 pack year tobacco history and is a current smoker or has quit within the past 15 years  - He presents no signs or symptoms of lung cancer    After discussion, the patient decided to elect lung cancer screening  Schedule a follow-up appointment in 3 months  Chief Complaint: F/u chronic conditions  Subjective     History of Present Illness:  Patient is a 65 y o  with a PMHx of alcoholic cirrhosis c/b chronic hepatitis C and varices (MELD of 7), HTN, and GERD presents today for routine follow-up  Patient states he is doing well overall; however, he was recently in [place] and felt ill  Because of this, he rescheduled his EGD and colonoscopy for after the new year    Patient states he is doing well with his hepatitis C treatment  No adverse effects  Patiently is currently compensated in the setting of his cirrhosis  Meld score of 7  Compliant with medications  Patient states that his blood pressures normally run with systolics in the 363W to 299I, was as high as 170 today  We will maintain blood pressure logs  Patient feels safe at home  Maintenance as above  Amenable to multiple vaccinations today  Review of Systems   Constitutional: Negative for chills and fatigue  HENT: Negative for congestion, nosebleeds, postnasal drip and rhinorrhea  Eyes: Negative for pain  Respiratory: Negative for cough and shortness of breath  Cardiovascular: Negative for chest pain  Gastrointestinal: Negative for abdominal distention, abdominal pain, constipation and diarrhea  Genitourinary: Negative for dysuria  Musculoskeletal: Negative for arthralgias and back pain  Skin: Negative for rash  Neurological: Negative for dizziness and headaches  Psychiatric/Behavioral: Negative for agitation and confusion           Current Outpatient Medications:   •  albuterol (PROVENTIL HFA,VENTOLIN HFA) 90 mcg/act inhaler, TAKE 2 PUFFS BY MOUTH EVERY 6 HOURS AS NEEDED FOR WHEEZE OR FOR SHORTNESS OF BREATH, Disp: 18 g, Rfl: 3  •  Blood Pressure KIT, Use daily, Disp: 1 kit, Rfl: 0  •  lisinopril (ZESTRIL) 20 mg tablet, TAKE 2 TABLETS BY MOUTH EVERY DAY, Disp: 90 tablet, Rfl: 3  •  Sofosbuvir-Velpatasvir (EPCLUSA) 400-100 MG tablet, Take 1 tablet by mouth daily, Disp: 28 tablet, Rfl: 2  •  nadolol (CORGARD) 20 mg tablet, Take 0 5 tablets (10 mg total) by mouth daily (Patient not taking: Reported on 12/9/2022), Disp: 90 tablet, Rfl: 0  •  pantoprazole (PROTONIX) 40 mg tablet, TAKE 1 TABLET BY MOUTH EVERY DAY (Patient not taking: Reported on 12/9/2022), Disp: 90 tablet, Rfl: 3  Past Medical History:   Diagnosis Date   • Arthritis    • Back pain, chronic    • Cirrhosis (HCC)    • Hepatitis     C   • Hepatitis C    • Lyme disease      Past Surgical History:   Procedure Laterality Date   • ESOPHAGOGASTRODUODENOSCOPY N/A 3/8/2018    Procedure: ESOPHAGOGASTRODUODENOSCOPY (EGD); Surgeon: Jelly Yadav MD;  [facility]; Service: Gastroenterology   • WRIST SURGERY Right     8/8/88     Social History     Socioeconomic History   • Marital status: Single     Spouse name: Not on file   • Number of children: Not on file   • Years of education: Not on file   • Highest education level: Not on file   Occupational History   • Not on file   Tobacco Use   • Smoking status: Every Day     Packs/day: 1 00     Years: 36 00     Pack years: 36 00     Types: Cigarettes   • Smokeless tobacco: Never   Vaping Use   • Vaping Use: Never used   Substance and Sexual Activity   • Alcohol use: Not Currently     Comment: stoped two months ago   • Drug use: No     Comment: Hx of drug abuse   • Sexual activity: Not Currently   Other Topics Concern   • Not on file   Social History Narrative    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  Social Determinants of Health     Financial Resource Strain: Low Risk    • Difficulty of Paying Living Expenses: Not hard at all   Food Insecurity: No Food Insecurity   • Worried About Running Out of Food in the Last Year: Never true   • Ran Out of Food in the Last Year: Never true   Transportation Needs: No Transportation Needs   • Lack of Transportation (Medical): No   • Lack of Transportation (Non-Medical):  No   Physical Activity: Not on file   Stress: Not on file   Social Connections: Not on file   Intimate Partner Violence: Not on file   Housing Stability: Not on file     Family History   Problem Relation Age of Onset   • Colon cancer Mother      Allergies   Allergen Reactions   • Bee Venom Swelling   • Codeine        Objective     Vitals:    12/09/22 0826   BP: 170/95   BP Location: Left arm   Patient Position: Sitting   Cuff Size: Large   Pulse: 89   Temp: 98 3 °F (36 8 °C)   TempSrc: Temporal   Weight: 83 9 kg (185 lb)   Height: 6' (1 829 m)       Physical exam:     Physical Exam  Vitals reviewed  Constitutional:       General: He is not in acute distress  HENT:      Head: Normocephalic and atraumatic  Right Ear: External ear normal       Left Ear: External ear normal       Nose: Nose normal       Mouth/Throat:      Mouth: Mucous membranes are moist       Pharynx: Oropharynx is clear  Eyes:      Extraocular Movements: Extraocular movements intact  Pupils: Pupils are equal, round, and reactive to light  Cardiovascular:      Rate and Rhythm: Normal rate and regular rhythm  Pulmonary:      Effort: Pulmonary effort is normal       Breath sounds: Normal breath sounds  Abdominal:      General: Abdomen is flat  Bowel sounds are normal  There is distension  Musculoskeletal:      Right lower leg: No edema  Left lower leg: No edema  Skin:     General: Skin is warm and dry  Capillary Refill: Capillary refill takes less than 2 seconds  Neurological:      General: No focal deficit present  Mental Status: He is alert and oriented to person, place, and time  Psychiatric:         Mood and Affect: Mood normal          Behavior: Behavior normal            ==  PLEASE NOTE:  This encounter was completed utilizing the [employer] Direct Speech Voice Recognition Software  Grammatical errors, random word insertions, pronoun errors and incomplete sentences are occasional consequences of the system due to software limitations, ambient noise and hardware issues  These may be missed by proof reading prior to affixing electronic signature  Any questions or concerns about the content, text or information contained within the body of this dictation should be directly addressed to the physician for clarification  Please do not hesitate to call me directly if you have any any questions or concerns      Buzz Dc DO, Angelique [address] 88  PGY-3, Internal Medicine  [facility]

## 2022-12-09 NOTE — PATIENT INSTRUCTIONS
Hypertension   WHAT YOU NEED TO KNOW:   Hypertension is high blood pressure  Your blood pressure is the force of your blood moving against the walls of your arteries  Hypertension causes your blood pressure to get so high that your heart has to work much harder than normal  This can damage your heart  The cause of hypertension may not be known  This is called essential or primary hypertension  Hypertension caused by another medical condition, such as kidney disease, is called secondary hypertension  DISCHARGE INSTRUCTIONS:   Call your local emergency number (911 in the [address] [place] [address],[address]) or have someone call if:   You have chest pain  You have any of the following signs of a heart attack:      Squeezing, pressure, or pain in your chest    You may  also have any of the following:     Discomfort or pain in your back, neck, jaw, stomach, or arm    Shortness of breath    Nausea or vomiting    Lightheadedness or a sudden cold sweat    You become confused or have trouble speaking  You suddenly feel lightheaded or have trouble breathing  Return to the emergency department if:   You have a severe headache or vision loss  You have weakness in an arm or leg  Call your doctor or cardiologist if:   You feel faint, dizzy, confused, or drowsy  You have been taking your blood pressure medicine but your pressure is higher than your provider says it should be  You have questions or concerns about your condition or care  Medicines: You may  need any of the following:  Antihypertensives  may be used to help lower your blood pressure  Several kinds of medicines are available  Your healthcare provider will choose medicines based on the kind of hypertension you have  You may need more than one type of medicine  Take the medicine exactly as directed  Diuretics  help decrease extra fluid that collects in your body  This will help lower your blood pressure   You may urinate more often while you take this medicine  Cholesterol medicine  helps lower your cholesterol level  A low cholesterol level helps prevent heart disease and makes it easier to control your blood pressure  Take your medicine as directed  Contact your healthcare provider if you think your medicine is not helping or if you have side effects  Tell him or her if you are allergic to any medicine  Keep a list of the medicines, vitamins, and herbs you take  Include the amounts, and when and why you take them  Bring the list or the pill bottles to follow-up visits  Carry your medicine list with you in case of an emergency  Follow up with your doctor or cardiologist as directed: You will need to return to have your blood pressure checked and to have other lab tests done  Write down your questions so you remember to ask them during your visits  Stages of hypertension:       Normal blood pressure is 119/79 or lower   Your healthcare provider may only check your blood pressure each year if it stays at a normal level  Elevated blood pressure is 120/79 to 129/79   This is sometimes called prehypertension  Your healthcare provider may suggest lifestyle changes to help lower your blood pressure to a normal level  He or she may then check it again in 3 to 6 months  Stage 1 hypertension is 130/80  to 139/89   Your provider may recommend lifestyle changes, medication, and checks every 3 to 6 months until your blood pressure is controlled  Stage 2 hypertension is 140/90 or higher   Your provider will recommend lifestyle changes and have you take 2 kinds of hypertension medicines  You will also need to have your blood pressure checked monthly until it is controlled  Manage hypertension:   Check your blood pressure at home  Avoid smoking, caffeine, and exercise at least 30 minutes before checking your blood pressure  Sit and rest for 5 minutes before you take your blood pressure  Extend your arm and support it on a flat surface   Your arm should be at the same level as your heart  Follow the directions that came with your blood pressure monitor  Check your blood pressure 2 times, 1 minute apart, before you take your medicine in the morning  Also check your blood pressure before your evening meal  Keep a record of your readings and bring it to your follow-up visits  Ask your healthcare provider what your blood pressure should be  Manage any other health conditions you have  Health conditions such as diabetes can increase your risk for hypertension  Follow your healthcare provider's instructions and take all your medicines as directed  Ask about all medicines  Certain medicines can increase your blood pressure  Examples include oral birth control pills, decongestants, herbal supplements, and NSAIDs, such as ibuprofen  Your healthcare provider can tell you which medicines are safe for you to take  This includes prescription and over-the-counter medicines  Lifestyle changes you can make to manage hypertension:  Your healthcare provider may recommend you work with a team to manage hypertension  The team may include medical experts such as a dietitian, an exercise or physical therapist, and a behavior therapist  Your family members may be included in helping you create lifestyle changes  Limit sodium (salt) as directed  Too much sodium can affect your fluid balance  Check labels to find low-sodium or no-salt-added foods  Some low-sodium foods use potassium salts for flavor  Too much potassium can also cause health problems  Your healthcare provider will tell you how much sodium and potassium are safe for you to have in a day  He or she may recommend that you limit sodium to 2,300 mg a day  Follow the meal plan recommended by your healthcare provider  A dietitian or your provider can give you more information on low-sodium plans or the DASH (Dietary Approaches to Stop Hypertension) eating plan   The DASH plan is low in sodium, processed sugar, unhealthy fats, and total fat  It is high in potassium, calcium, and fiber  These can be found in vegetables, fruit, and whole-grain foods  Be physically active throughout the day  Physical activity, such as exercise, can help control your blood pressure and your weight  Be physically active for at least 30 minutes per day, on most days of the week  Include aerobic activity, such as walking or riding a bicycle  Also include strength training at least 2 times each week  Your healthcare providers can help you create a physical activity plan  Decrease stress  This may help lower your blood pressure  Learn ways to relax, such as deep breathing or listening to music  Limit alcohol as directed  Alcohol can increase your blood pressure  A drink of alcohol is 12 ounces of beer, 5 ounces of wine, or 1½ ounces of liquor  Do not smoke  Nicotine and other chemicals in cigarettes and cigars can increase your blood pressure and also cause lung damage  Ask your healthcare provider for information if you currently smoke and need help to quit  E-cigarettes or smokeless tobacco still contain nicotine  Talk to your healthcare provider before you use these products  © Copyright [employer] [facility] 2022 Information is for End User's use only and may not be sold, redistributed or otherwise used for commercial purposes  All illustrations and images included in CareNotes® are the copyrighted property of [employer]  or [employer]  The above information is an  only  It is not intended as medical advice for individual conditions or treatments  Talk to your doctor, nurse or pharmacist before following any medical regimen to see if it is safe and effective for you

## 2023-01-01 ENCOUNTER — HOSPITAL ENCOUNTER (EMERGENCY)
Facility: HOSPITAL | Age: 63
Discharge: HOME | End: 2023-01-01
Attending: EMERGENCY MEDICINE

## 2023-01-01 VITALS
SYSTOLIC BLOOD PRESSURE: 148 MMHG | RESPIRATION RATE: 18 BRPM | DIASTOLIC BLOOD PRESSURE: 96 MMHG | TEMPERATURE: 98.1 F | OXYGEN SATURATION: 97 % | HEART RATE: 88 BPM

## 2023-01-01 DIAGNOSIS — K04.7 DENTAL ABSCESS: Primary | ICD-10-CM

## 2023-01-01 DIAGNOSIS — K04.7 PERIAPICAL ABSCESS: ICD-10-CM

## 2023-01-01 RX ORDER — ACETAMINOPHEN 325 MG/1
650 TABLET ORAL ONCE
Status: COMPLETED | OUTPATIENT
Start: 2023-01-01 | End: 2023-01-01

## 2023-01-01 RX ORDER — AMOXICILLIN AND CLAVULANATE POTASSIUM 875; 125 MG/1; MG/1
1 TABLET, FILM COATED ORAL EVERY 12 HOURS
Qty: 14 TABLET | Refills: 0 | Status: SHIPPED | OUTPATIENT
Start: 2023-01-01 | End: 2023-01-08

## 2023-01-01 RX ORDER — AMOXICILLIN AND CLAVULANATE POTASSIUM 875; 125 MG/1; MG/1
1 TABLET, FILM COATED ORAL ONCE
Status: COMPLETED | OUTPATIENT
Start: 2023-01-01 | End: 2023-01-01

## 2023-01-01 RX ADMIN — ACETAMINOPHEN 650 MG: 325 TABLET, FILM COATED ORAL at 13:32

## 2023-01-01 RX ADMIN — TOPICAL ANESTHETIC 2 SPRAY: 200 SPRAY DENTAL; PERIODONTAL at 13:55

## 2023-01-01 RX ADMIN — AMOXICILLIN AND CLAVULANATE POTASSIUM 1 TABLET: 875; 125 TABLET, FILM COATED ORAL at 13:32

## 2023-01-01 NOTE — DISCHARGE INSTRUCTIONS
You are seen evaluated emergency department over concern of a dental abscess  We performed an incision and drainage  He tolerated the procedure well  Please follow-up with the dental clinic phone numbers number provided with you  Please take the antibiotic twice per day for 7 days  Please return to the emergency department if you have any concerns about your medical condition  Please follow-up with your primary care provider within 1 week  I reviewed all testing with the patient: physical exam, I&D  I gave oral return precautions for what to return for in addition to the written return precautions  The patient verbalized understanding of the discharge instructions and warnings that would necessitate return to the Emergency Department  I specifically highlighted areas of special concern regarding the written and verbal discharge instructions and return precautions  All questions were answered prior to discharge

## 2023-01-01 NOTE — ED PROVIDER NOTES
History  Chief Complaint   Patient presents with   • Dental Pain     Upper right tooth pain for the past three days has tried at home treatments without help  61-year-old male who presents emergency department complaining of right tooth pain for approximately 3 days  Describes fullness in the right side of his face in the area of the maxilla  He states it is gingival mucosa in the area of tooth 2 and 3 have been inflamed swollen for the last few days  Denies any purulent discharge  No difficulty talking, difficulty swallowing, fevers, chills, change in vision  No trismus or stridor  Patient does not follow-up with a dentist on a regular basis  Prior to Admission Medications   Prescriptions Last Dose Informant Patient Reported? Taking? Blood Pressure KIT   No No   Sig: Use daily   Sofosbuvir-Velpatasvir (EPCLUSA) 400-100 MG tablet   No No   Sig: Take 1 tablet by mouth daily   albuterol (PROVENTIL HFA,VENTOLIN HFA) 90 mcg/act inhaler   No No   Sig: TAKE 2 PUFFS BY MOUTH EVERY 6 HOURS AS NEEDED FOR WHEEZE OR FOR SHORTNESS OF BREATH   lisinopril (ZESTRIL) 20 mg tablet   No No   Sig: TAKE 2 TABLETS BY MOUTH EVERY DAY   nadolol (CORGARD) 20 mg tablet   No No   Sig: Take 0 5 tablets (10 mg total) by mouth daily      Facility-Administered Medications: None       Past Medical History:   Diagnosis Date   • Arthritis    • Back pain, chronic    • Cirrhosis (HCC)    • Hepatitis     C   • Hepatitis C    • Lyme disease        Past Surgical History:   Procedure Laterality Date   • ESOPHAGOGASTRODUODENOSCOPY N/A 3/8/2018    Procedure: ESOPHAGOGASTRODUODENOSCOPY (EGD); Surgeon: Winter Wynn MD;  Location: [facility]; Service: Gastroenterology   • WRIST SURGERY Right     8/8/88       Family History   Problem Relation Age of Onset   • Colon cancer Mother      I have reviewed and agree with the history as documented      E-Cigarette/Vaping   • E-Cigarette Use Never User      E-Cigarette/Vaping Substances   • Nicotine No    • THC No    • CBD No    • Flavoring No    • Other No    • Unknown No      Social History     Tobacco Use   • Smoking status: Every Day     Packs/day: 1 00     Years: 36 00     Pack years: 36 00     Types: Cigarettes   • Smokeless tobacco: Never   Vaping Use   • Vaping Use: Never used   Substance Use Topics   • Alcohol use: Not Currently     Comment: ian quite, had 2 beers a few days ago   • Drug use: No     Comment: Hx of drug abuse        Review of Systems   Constitutional: Negative for chills and fever  HENT: Positive for dental problem and facial swelling  Negative for ear pain and sore throat  Eyes: Negative for pain and visual disturbance  Respiratory: Negative for cough and shortness of breath  Cardiovascular: Negative for chest pain and palpitations  Gastrointestinal: Negative for abdominal pain and vomiting  Genitourinary: Negative for dysuria and hematuria  Musculoskeletal: Negative for arthralgias and back pain  Skin: Negative for color change and rash  Neurological: Negative for seizures and syncope  All other systems reviewed and are negative  Physical Exam  ED Triage Vitals [01/01/23 1217]   Temperature Pulse Respirations Blood Pressure SpO2   98 1 °F (36 7 °C) 88 18 148/96 97 %      Temp Source Heart Rate Source Patient Position - Orthostatic VS BP Location FiO2 (%)   Oral -- -- -- --      Pain Score       6             Orthostatic Vital Signs  Vitals:    01/01/23 1217   BP: 148/96   Pulse: 88       Physical Exam  Vitals and nursing note reviewed  Constitutional:       General: He is not in acute distress  Appearance: He is well-developed  HENT:      Head: Normocephalic and atraumatic  Mouth/Throat:      Lips: Pink  Mouth: Mucous membranes are moist       Dentition: Gingival swelling and dental abscesses present  Tongue: No lesions  Pharynx: Oropharynx is clear  Tonsils: No tonsillar exudate       Eyes: Conjunctiva/sclera: Conjunctivae normal    Cardiovascular:      Rate and Rhythm: Normal rate and regular rhythm  Heart sounds: No murmur heard  Pulmonary:      Effort: Pulmonary effort is normal  No respiratory distress  Breath sounds: Normal breath sounds  Abdominal:      Palpations: Abdomen is soft  Tenderness: There is no abdominal tenderness  Musculoskeletal:         General: No swelling  Cervical back: Neck supple  Skin:     General: Skin is warm and dry  Capillary Refill: Capillary refill takes less than 2 seconds  Neurological:      Mental Status: He is alert  Psychiatric:         Mood and Affect: Mood normal          ED Medications  Medications   acetaminophen (TYLENOL) tablet 650 mg (650 mg Oral Given 1/1/23 1332)   amoxicillin-clavulanate (AUGMENTIN) 875-125 mg per tablet 1 tablet (1 tablet Oral Given 1/1/23 1332)   benzocaine (HURRICAINE) 20 % mucosal spray 2 spray (2 sprays Mucosal Given by Other 1/1/23 1355)       Diagnostic Studies  Results Reviewed     None                 No orders to display         Procedures  Incision and drain    Date/Time: 1/1/2023 2:10 PM  Performed by: Alber Cole DO  Authorized by: Alber Cole DO   [facility] Protocol:  Consent: Verbal consent obtained  Risks and benefits: risks, benefits and alternatives were discussed  Consent given by: patient  Timeout called at: 1/1/2023 2:10 PM   Patient understanding: patient states understanding of the procedure being performed  Patient consent: the patient's understanding of the procedure matches consent given  Procedure consent: procedure consent matches procedure scheduled  Patient identity confirmed: verbally with patient      Patient location:  ED  Location:     Type:  Abscess    Location:  Mouth    Location Detail:  Intraoral    Mouth location:  Alveolar process  Anesthesia (see MAR for exact dosages):      Anesthesia method:  Topical application    Topical anesthetic:  Benzocaine gel  Procedure details:     Complexity:  Simple    Needle aspiration: no      Incision types:  Single straight    Aspiration type: puncture aspiration      Scalpel blade:  11    Approach:  Open    Incision depth:  Submucosal    Wound management:  Irrigated with saline    Drainage:  Purulent    Drainage amount: Moderate    Wound treatment:  Wound left open    Packing materials:  None  Post-procedure details:     Patient tolerance of procedure: Tolerated well, no immediate complications          ED Course                             SBIRT 22yo+    Flowsheet Row Most Recent Value   SBIRT (25 yo +)    In order to provide better care to our patients, we are screening all of our patients for alcohol and drug use  Would it be okay to ask you these screening questions? Yes Filed at: 01/01/2023 1309   Initial Alcohol Screen: US AUDIT-C     1  How often do you have a drink containing alcohol? 0 Filed at: 01/01/2023 1309   2  How many drinks containing alcohol do you have on a typical day you are drinking? 0 Filed at: 01/01/2023 1309   3a  Male UNDER 65: How often do you have five or more drinks on one occasion? 0 Filed at: 01/01/2023 1309   3b  FEMALE Any Age, or MALE 65+: How often do you have 4 or more drinks on one occassion? 0 Filed at: 01/01/2023 1309   Audit-C Score 0 Filed at: 01/01/2023 1309   SHREYAS: How many times in the past year have you    Used an illegal drug or used a prescription medication for non-medical reasons? Never Filed at: 01/01/2023 1309                Medical Decision Making  36-year-old male presents to the emergency department with a gingival swelling and dental abscess    DDx: Periapical abscess, dental abscess, pre-septal cellulitis    Plan: Augmentin, incision and drainage, follow-up dental clinic    That shows a periapical abscess in the area of tooth 2 and 3  Patient is willing to have incision and drainage performed in the emergency department  I&D was successfully performed    Patient tolerated the procedure well  Purulent discharge was obtained the procedure  Patient received Tylenol and Augmentin in the emergency department  Patient was given prescription for Augmentin twice daily for 7 days  Patient given phone numbers for dental clinic to follow-up with  Patient instructed to follow-up with his primary care provider  All questions were answered prior to discharge  Disposition  Final diagnoses:   Dental abscess   Periapical abscess     Time reflects when diagnosis was documented in both MDM as applicable and the Disposition within this note     Time User Action Codes Description Comment    1/1/2023  1:26 PM Leontine Folds Add [K04 7] Dental abscess     1/1/2023  1:32 PM Leontine Folds Add [K04 7] Periapical abscess       ED Disposition     ED Disposition   Discharge    Condition   Stable    Date/Time   Sun Jan 1, 2023  2:33 PM    [address] [employer] discharge to home/self care                 Follow-up Information     Follow up With Specialties Details Why Contact Info Additional [address] Emergency Department Emergency Medicine Go to  If symptoms worsen [address] [address]  [address] Emergency Department, [address], [address] [facility]  Virtual  Call in 1 week [phone] [facility] [address]     [address] [facility] Dentistry Call in 1 day  [facility] [address]  [address] [facility], [facility], [place], [place], [address], [phone]          Discharge Medication List as of 1/1/2023  2:36 PM      START taking these medications    Details   amoxicillin-clavulanate (AUGMENTIN) 875-125 mg per tablet Take 1 tablet by mouth every 12 (twelve) hours for 7 days, Starting Sun 1/1/2023, Until Sun 1/8/2023, Normal         CONTINUE these medications which have NOT CHANGED    Details   albuterol (PROVENTIL HFA,VENTOLIN HFA) 90 mcg/act inhaler TAKE 2 PUFFS BY MOUTH EVERY 6 HOURS AS NEEDED FOR WHEEZE OR FOR SHORTNESS OF BREATH, Normal      Blood Pressure KIT Use daily, Starting Wed 10/13/2021, Normal      lisinopril (ZESTRIL) 20 mg tablet TAKE 2 TABLETS BY MOUTH EVERY DAY, Normal      nadolol (CORGARD) 20 mg tablet Take 0 5 tablets (10 mg total) by mouth daily, Starting Fri 12/9/2022, Until Wed 6/7/2023, Normal      Sofosbuvir-Velpatasvir (EPCLUSA) 400-100 MG tablet Take 1 tablet by mouth daily, Starting Tue 9/27/2022, Until Tue 12/20/2022, Normal           No discharge procedures on file  PDMP Review     None           ED Provider  Attending physically available and evaluated Az Lal  HERNANDEZ managed the patient along with the ED Attending      Electronically Signed by         Yesenia Herring DO  01/01/23 6885

## 2023-01-01 NOTE — ED ATTENDING ATTESTATION
1/1/2023  ITemitope DO, saw and evaluated the patient  I have discussed the patient with the resident/non-physician practitioner and agree with the resident's/non-physician practitioner's findings, Plan of Care, and MDM as documented in the resident's/non-physician practitioner's note, except where noted  All available labs and Radiology studies were reviewed  I was present for key portions of any procedure(s) performed by the resident/non-physician practitioner and I was immediately available to provide assistance  At this point I agree with the current assessment done in the Emergency Department  I have conducted an independent evaluation of this patient a history and physical is as follows:    29-year-old male, says about 3 days ago began having some pain in his right upper jaw area, around the gumline  Slightly extending into the cheek as well  No difficulty swallowing, worse with chewing  No headache, no fever, no chills, no visual changes  Said he does not have a dentist currently but has a list of dentist that he could potentially see  General:  Patient is well-appearing  Head:  Atraumatic  Eyes:  Conjunctiva pink  ENT: Patient has no visible facial swelling, no facial erythema  No periorbital erythema  His mucous members are moist, there is no trismus, there is some swelling around the upper right outer gingival mucosa around teeth #3 and 4  He has no swelling the posterior pharynx, no swelling the floor the mouth  No uvula deviation  No voice change  Neck:  Supple, no stridor  Pulmonary:  Lungs clear to auscultation bilaterally  Neurologic:  Awake, fluent speech, normal comprehension  Skin:  Pink warm and dry  Psychiatric:  Alert, pleasant, cooperative      ED Course     Patient has had the periapical abscess drained by ED physician Dr Domo Obrien, purulent material was expressed  He tolerated procedure well without complications      Patient has mild periapical abscess, no signs of significant facial erythema, no trismus or swelling or indication for imaging    MEDICAL DECISION MAKING CODING    AMOUNT AND/OR COMPLEXITY OF DATA REVIEWED:  History obtained from: Patient    Differential diagnosis (including but not limited to): Periapical abscess,    Tests considered but not ordered: Laboratory studies and imaging however they would not contribute to patient management    RISK  Drugs (OTC, Rx, Controlled substances): Prescribed Augmentin  Unless noted elsewhere, all of patient's current medications should be continued      Treatment:  Surgery: Incision and drainage with local anesthesia              Critical Care Time  Procedures

## 2023-01-30 DIAGNOSIS — I10 ESSENTIAL HYPERTENSION: ICD-10-CM

## 2023-01-30 RX ORDER — LISINOPRIL 20 MG/1
TABLET ORAL
Qty: 90 TABLET | Refills: 3 | Status: SHIPPED | OUTPATIENT
Start: 2023-01-30

## 2023-02-13 ENCOUNTER — HOSPITAL ENCOUNTER (OUTPATIENT)
Dept: RADIOLOGY | Facility: HOSPITAL | Age: 63
Discharge: HOME | End: 2023-02-13

## 2023-02-13 DIAGNOSIS — F17.210 CIGARETTE NICOTINE DEPENDENCE WITHOUT COMPLICATION: Chronic | ICD-10-CM

## 2023-03-22 DIAGNOSIS — B18.2 CHRONIC HEPATITIS C WITHOUT HEPATIC COMA (HCC): Primary | ICD-10-CM

## 2023-05-11 ENCOUNTER — CONSULT (OUTPATIENT)
Dept: GASTROENTEROLOGY | Facility: CLINIC | Age: 63
End: 2023-05-11

## 2023-05-11 VITALS
SYSTOLIC BLOOD PRESSURE: 128 MMHG | DIASTOLIC BLOOD PRESSURE: 80 MMHG | BODY MASS INDEX: 23.98 KG/M2 | HEIGHT: 72 IN | TEMPERATURE: 98.1 F | WEIGHT: 177 LBS

## 2023-05-11 DIAGNOSIS — K70.31 ALCOHOLIC CIRRHOSIS OF LIVER WITH ASCITES (HCC): Primary | Chronic | ICD-10-CM

## 2023-05-11 DIAGNOSIS — Z12.11 ENCOUNTER FOR SCREENING COLONOSCOPY: ICD-10-CM

## 2023-05-11 DIAGNOSIS — I85.00 ESOPHAGEAL VARICES WITHOUT BLEEDING, UNSPECIFIED ESOPHAGEAL VARICES TYPE (HCC): ICD-10-CM

## 2023-05-11 RX ORDER — BISACODYL 5 MG/1
20 TABLET, DELAYED RELEASE ORAL ONCE
Qty: 4 TABLET | Refills: 0 | Status: SHIPPED | OUTPATIENT
Start: 2023-05-11 | End: 2023-05-11

## 2023-05-11 NOTE — PROGRESS NOTES
[facility] - Consultation  Bc Cerrato 65 y o  male MRN: [id number]  Unit/Bed#:  Encounter: [id number]    Reason for Consult / Principal Problem:     Alcoholic cirrhosis    ASSESSMENT & PLAN:      1  Cirrhosis   Decompensated cirrhosis - complicated by varices  Etiology: Alcohol and Hepatitis C  MELD score: MELD-Na score: 6 at 9/23/2022 10:29 AM  MELD score: 6 at 9/23/2022 10:29 AM  Calculated from:  Serum Creatinine: 0 88 mg/dL (Using min of 1 mg/dL) at 9/23/2022 10:29 AM  Serum Sodium: 138 mmol/L (Using max of 137 mmol/L) at 9/23/2022 10:29 AM  Total Bilirubin: 0 97 mg/dL (Using min of 1 mg/dL) at 9/23/2022 10:29 AM  INR(ratio): 1 03 at 9/23/2022 10:29 AM  Age: 62 years  Child-Martinez: Child-martinez class A based on labs 2022  Ascites: No  Portal hypertension: Small esophgeal and cardia varices in 2018   PSE: No  HCC screening:  Last RUQ U/S without masses on 9/2022 and no AFP on file for [facility]  screening  Vaccine: Hep B vaccine in 2021 (#1), 2022 (#2)  · Follow hepatology in 2-3 months  · MELD labs - CMP, INR  · Monitor immunity to Hepatitis C surface Ab   · HCC screen - obtain AFP and RUQ U/S q6months (monitor for ascites in setting of distended abd)  · EGD for follow up of varices     2  Hepatitis C infection , s/p Epclusa  Patient achieve SVR in 11/2022  Finished course of epclusa  · Follow up with Hep C quant with PCP    2  Black tarry stool  History of black stool x2 in the past  Normal brown stools at this time  Concerns for variceal bleed, AVMs, gastric ulcer -- requires EGD  · EGD and colonoscopy     3  Need for screening colonoscopy  Hx of colon cancer in mother diagnosed at 80years old  No prior colonoscopy, overdue     · Ordered colonoscopy   · PEG bowel prep  · ducolax 20 mg for bowel prep  ______________________________________________________________________    HISTORY OF PRESENT ILLNESS:     Patient is a 58M with PMH of alcoholic and hepatitis C cirrhosis c/b varices, history of cody khalil tear in 2018, and hepatitis C (achieved SVR in 11/2022, s/p Epclusa)  Patient presents as a consultation for cirrhosis  Patient has had 2 black tarry bowel movements in April of last year for which he was told to follow up with PCP and gastroenterology  He did not have any hematemesis at that time and denies and hematochezia  He does endorse some hemorrhoids and straining with intermittent constipation -- he reduces hemorrhoids himself when they arise  Additionally patient does endorse having increased distension in abdomen that started couple months ago  He believes that his diet has worsened recently  Denies any abdominal pain, nausea, vomiting, diarrhea, confusion  Patient states he quit heavy drinking, now drinks a beer or a shot of hard liquor once every 5 months  Patient never followed up with repeat EGD and never had colonoscopy  Last RUQ U/S without masses on 9/2022 and no AFP on [facility] for [facility]  screening  Patient has family history of colon cancer in mother who was 80 when diagnosed  Last EGD: 2018 with Dr Fred Neely   LA Class D esophagitis was found in the esophagus  A 10 mm Cody-Khalil tear was found in the distal third of the   esophagus  It bled on contact  To control bleeding, 1 clip was applied  To   control bleeding, 3 injections of epinephrine was administered; the total   dosage was a 5 cc  Bleeding control was successful  Small varices was   found in the distal third of the esophagus and cardia (GOV-1)       Last colonoscopy: none in past per pt    REVIEW OF SYSTEMS:    CONSTITUTIONAL: Denies any fever, chills, rigors, and weight loss  HEENT: No earache or tinnitus, denies hearing loss or visual disturbances  CARDIOVASCULAR: No chest pain or palpitations   RESPIRATORY: Denies any cough, hemoptysis, shortness of breath or dyspnea on exertion  GASTROINTESTINAL: As noted in the History of Present Illness   GENITOURINARY: No problems with urination, denies any hematuria or dysuria  NEUROLOGIC: No dizziness or vertigo, denies headaches   MUSCULOSKELETAL: Denies any muscle or joint pain   SKIN: Denies skin rashes or itching   ENDOCRINE: Denies excessive thirst, denies intolerance to heat or cold  PSYCHOSOCIAL: Denies depression or anxiety, denies any recent memory loss     Historical Information   Past Medical History:   Diagnosis Date   • Arthritis    • Back pain, chronic    • Cirrhosis ([address] )    • Hepatitis     C   • Hepatitis C    • Lyme disease      Past Surgical History:   Procedure Laterality Date   • ESOPHAGOGASTRODUODENOSCOPY N/A 3/8/2018    Procedure: ESOPHAGOGASTRODUODENOSCOPY (EGD); Surgeon: Sergio Marques MD;  Location: [facility]; Service: Gastroenterology   • WRIST SURGERY Right     8/8/88     Social History   Social History     Substance and Sexual Activity   Alcohol Use Not Currently    Comment: nope quite, had 2 beers a few days ago     Social History     Substance and Sexual Activity   Drug Use No    Comment: Hx of drug abuse     Social History     Tobacco Use   Smoking Status Every Day   • Packs/day: 1 00   • Years: 36 00   • Pack years: 36 00   • Types: Cigarettes   Smokeless Tobacco Never     Family History   Problem Relation Age of Onset   • Colon cancer Mother        Meds/Allergies   (Not in a hospital admission)    No current facility-administered medications for this visit  Allergies   Allergen Reactions   • Bee Venom Swelling   • Codeine        PHYSICAL EXAM:      Objective   Blood pressure 128/80, temperature 98 1 °F (36 7 °C), temperature source Tympanic, height 6' (1 829 m), weight 80 3 kg (177 lb)  Body mass index is 24 01 kg/m²    [unfilled]    General Appearance:   Alert, cooperative, no distress   HEENT:   Normocephalic, atraumatic, anicteric     Neck:   Supple, symmetrical, trachea midline   Lungs:   Equal chest rise, respirations unlabored    Heart:   Regular rate and rhythm   Abdomen:   Soft, non-tender, mild distension with shifting dullness; normal bowel sounds; no masses   Rectal:   Deferred    Extremities:   No cyanosis, clubbing or edema    Neuro: Moves all 4 extremities    Skin:   No jaundice, rashes, or lesions      LAB RESULTS:     No visits with results within 1 Day(s) from this visit  Latest known visit with results is:   Appointment on 11/14/2022   Component Date Value   • HCV PCR Quantitative 11/14/2022 HCV Not Detected    • Test Information 11/14/2022 Comment    • WBC 11/14/2022 5 12    • RBC 11/14/2022 4 14    • Hemoglobin 11/14/2022 12 9    • Hematocrit 11/14/2022 39 7    • MCV 11/14/2022 96    • MCH 11/14/2022 31 2    • MCHC 11/14/2022 32 5    • RDW 11/14/2022 13 7    • MPV 11/14/2022 11 0    • Platelets 94/05/6197 130 (L)    • nRBC 11/14/2022 0    • Neutrophils Relative 11/14/2022 52    • Immat GRANS % 11/14/2022 0    • Lymphocytes Relative 11/14/2022 28    • Monocytes Relative 11/14/2022 14 (H)    • Eosinophils Relative 11/14/2022 5    • Basophils Relative 11/14/2022 1    • Neutrophils Absolute 11/14/2022 2 66    • Immature Grans Absolute 11/14/2022 0 01    • Lymphocytes Absolute 11/14/2022 1 42    • Monocytes Absolute 11/14/2022 0 72    • Eosinophils Absolute 11/14/2022 0 24    • Basophils Absolute 11/14/2022 0 07    • Sodium 11/14/2022 141    • Potassium 11/14/2022 3 5    • Chloride 11/14/2022 112 (H)    • CO2 11/14/2022 22    • ANION GAP 11/14/2022 7    • BUN 11/14/2022 12    • Creatinine 11/14/2022 0 86    • Glucose, Fasting 11/14/2022 102 (H)    • Calcium 11/14/2022 10 0    • Corrected Calcium 11/14/2022 10 8 (H)    • AST 11/14/2022 31    • ALT 11/14/2022 24    • Alkaline Phosphatase 11/14/2022 103    • Total Protein 11/14/2022 8 2    • Albumin 11/14/2022 3 0 (L)    • Total Bilirubin 11/14/2022 0 92    • eGFR 11/14/2022 93        RADIOLOGY RESULTS: I have personally reviewed pertinent imaging studies      Margaret Remy MD  Internal Medicine Residency, PGY-2  [address], [facility]  Available on [employer]

## 2023-05-11 NOTE — PATIENT INSTRUCTIONS
Please schedule endoscopy and colonoscopy  Please obtain labwork  Please obtain ultrasound of liver  Follow up with liver specialists in 3 months

## 2023-05-12 ENCOUNTER — TELEPHONE (OUTPATIENT)
Dept: GASTROENTEROLOGY | Facility: CLINIC | Age: 63
End: 2023-05-12

## 2023-05-12 NOTE — TELEPHONE ENCOUNTER
Scheduled date of EGD/colonoscopy (as of today):07/25/2023  Physician performing EGD/colonoscopy:Reuben  Location of EGD/colonoscopy:[place]  Desired bowel prep reviewed with patient: Audi Suarez  Instructions reviewed with patient by: Kaye Birch  Clearances:  N/A    Referred to Liver specialist in 3 months

## 2023-05-15 RX ORDER — POLYETHYLENE GLYCOL-3350 AND ELECTROLYTES WITH FLAVOR PACK 240; 5.84; 2.98; 6.72; 22.72 G/278.26G; G/278.26G; G/278.26G; G/278.26G; G/278.26G
4000 POWDER, FOR SOLUTION ORAL ONCE
Qty: 4000 ML | Refills: 0 | Status: SHIPPED | OUTPATIENT
Start: 2023-05-15 | End: 2023-05-15

## 2023-05-17 DIAGNOSIS — I85.00 VARICES, ESOPHAGEAL (HCC): ICD-10-CM

## 2023-05-18 RX ORDER — NADOLOL 20 MG/1
TABLET ORAL
Qty: 45 TABLET | Refills: 1 | Status: SHIPPED | OUTPATIENT
Start: 2023-05-18

## 2023-05-19 ENCOUNTER — HOSPITAL ENCOUNTER (OUTPATIENT)
Dept: RADIOLOGY | Facility: HOSPITAL | Age: 63
Discharge: HOME | End: 2023-05-19

## 2023-05-19 DIAGNOSIS — K70.31 ALCOHOLIC CIRRHOSIS OF LIVER WITH ASCITES (HCC): Chronic | ICD-10-CM

## 2023-05-23 ENCOUNTER — TELEPHONE (OUTPATIENT)
Dept: GASTROENTEROLOGY | Facility: CLINIC | Age: 63
End: 2023-05-23

## 2023-05-23 NOTE — RESULT ENCOUNTER NOTE
Patient was called by me and given results of his right upper quadrant ultrasound  No HCC liver mass identified on ultrasound  Patient understanding and will follow-up at his next endoscopy visit on July 25th

## 2023-07-18 ENCOUNTER — HOSPITAL ENCOUNTER (EMERGENCY)
Facility: HOSPITAL | Age: 63
Discharge: HOME | End: 2023-07-19
Attending: EMERGENCY MEDICINE | Admitting: EMERGENCY MEDICINE
Payer: MEDICARE

## 2023-07-18 ENCOUNTER — APPOINTMENT (EMERGENCY)
Dept: RADIOLOGY | Facility: HOSPITAL | Age: 63
End: 2023-07-18
Payer: MEDICARE

## 2023-07-18 DIAGNOSIS — R07.81 RIB PAIN ON LEFT SIDE: Primary | ICD-10-CM

## 2023-07-18 LAB
ALBUMIN SERPL BCP-MCNC: 3.5 G/DL (ref 3.5–5)
ALP SERPL-CCNC: 110 U/L (ref 46–116)
ALT SERPL W P-5'-P-CCNC: 21 U/L (ref 12–78)
ANION GAP SERPL CALCULATED.3IONS-SCNC: 5 MMOL/L
AST SERPL W P-5'-P-CCNC: 23 U/L (ref 5–45)
BASOPHILS # BLD AUTO: 0.05 THOUSANDS/ÂΜL (ref 0–0.1)
BASOPHILS NFR BLD AUTO: 1 % (ref 0–1)
BILIRUB SERPL-MCNC: 1.38 MG/DL (ref 0.2–1)
BUN SERPL-MCNC: 10 MG/DL (ref 5–25)
CALCIUM SERPL-MCNC: 10.5 MG/DL (ref 8.3–10.1)
CARDIAC TROPONIN I PNL SERPL HS: 5 NG/L
CHLORIDE SERPL-SCNC: 105 MMOL/L (ref 96–108)
CO2 SERPL-SCNC: 24 MMOL/L (ref 21–32)
CREAT SERPL-MCNC: 0.89 MG/DL (ref 0.6–1.3)
EOSINOPHIL # BLD AUTO: 0.12 THOUSAND/ÂΜL (ref 0–0.61)
EOSINOPHIL NFR BLD AUTO: 1 % (ref 0–6)
ERYTHROCYTE [DISTWIDTH] IN BLOOD BY AUTOMATED COUNT: 14.7 % (ref 11.6–15.1)
GFR SERPL CREATININE-BSD FRML MDRD: 92 ML/MIN/1.73SQ M
GLUCOSE SERPL-MCNC: 117 MG/DL (ref 65–140)
HCT VFR BLD AUTO: 45.6 % (ref 36.5–49.3)
HGB BLD-MCNC: 15.6 G/DL (ref 12–17)
IMM GRANULOCYTES # BLD AUTO: 0.01 THOUSAND/UL (ref 0–0.2)
IMM GRANULOCYTES NFR BLD AUTO: 0 % (ref 0–2)
LYMPHOCYTES # BLD AUTO: 1.65 THOUSANDS/ÂΜL (ref 0.6–4.47)
LYMPHOCYTES NFR BLD AUTO: 16 % (ref 14–44)
MCH RBC QN AUTO: 31.5 PG (ref 26.8–34.3)
MCHC RBC AUTO-ENTMCNC: 34.2 G/DL (ref 31.4–37.4)
MCV RBC AUTO: 92 FL (ref 82–98)
MONOCYTES # BLD AUTO: 1.84 THOUSAND/ÂΜL (ref 0.17–1.22)
MONOCYTES NFR BLD AUTO: 18 % (ref 4–12)
NEUTROPHILS # BLD AUTO: 6.55 THOUSANDS/ÂΜL (ref 1.85–7.62)
NEUTS SEG NFR BLD AUTO: 64 % (ref 43–75)
NRBC BLD AUTO-RTO: 0 /100 WBCS
PLATELET # BLD AUTO: 146 THOUSANDS/UL (ref 149–390)
PMV BLD AUTO: 10.1 FL (ref 8.9–12.7)
POTASSIUM SERPL-SCNC: 3.8 MMOL/L (ref 3.5–5.3)
PROT SERPL-MCNC: 8.6 G/DL (ref 6.4–8.4)
RBC # BLD AUTO: 4.95 MILLION/UL (ref 3.88–5.62)
SODIUM SERPL-SCNC: 134 MMOL/L (ref 135–147)
WBC # BLD AUTO: 10.22 THOUSAND/UL (ref 4.31–10.16)

## 2023-07-18 PROCEDURE — 71260 CT THORAX DX C+: CPT

## 2023-07-18 PROCEDURE — 99284 EMERGENCY DEPT VISIT MOD MDM: CPT

## 2023-07-18 PROCEDURE — 80053 COMPREHEN METABOLIC PANEL: CPT

## 2023-07-18 PROCEDURE — 96374 THER/PROPH/DIAG INJ IV PUSH: CPT

## 2023-07-18 PROCEDURE — 74177 CT ABD & PELVIS W/CONTRAST: CPT

## 2023-07-18 PROCEDURE — 85025 COMPLETE CBC W/AUTO DIFF WBC: CPT

## 2023-07-18 PROCEDURE — 84484 ASSAY OF TROPONIN QUANT: CPT

## 2023-07-18 PROCEDURE — 36415 COLL VENOUS BLD VENIPUNCTURE: CPT

## 2023-07-18 RX ORDER — HYDROMORPHONE HCL IN WATER/PF 6 MG/30 ML
0.2 PATIENT CONTROLLED ANALGESIA SYRINGE INTRAVENOUS ONCE
Status: COMPLETED | OUTPATIENT
Start: 2023-07-18 | End: 2023-07-18

## 2023-07-18 RX ADMIN — HYDROMORPHONE HYDROCHLORIDE 0.2 MG: 0.2 INJECTION, SOLUTION INTRAMUSCULAR; INTRAVENOUS; SUBCUTANEOUS at 22:37

## 2023-07-18 RX ADMIN — IOHEXOL 100 ML: 350 INJECTION, SOLUTION INTRAVENOUS at 22:30

## 2023-07-19 VITALS
HEIGHT: 72 IN | BODY MASS INDEX: 23.98 KG/M2 | HEART RATE: 84 BPM | SYSTOLIC BLOOD PRESSURE: 121 MMHG | OXYGEN SATURATION: 92 % | WEIGHT: 177 LBS | DIASTOLIC BLOOD PRESSURE: 80 MMHG | TEMPERATURE: 96.6 F | RESPIRATION RATE: 18 BRPM

## 2023-07-19 NOTE — ED ATTENDING ATTESTATION
7/18/2023  I, Andria Nunes MD, saw and evaluated the patient. I have discussed the patient with the resident/non-physician practitioner and agree with the resident's/non-physician practitioner's findings, Plan of Care, and MDM as documented in the resident's/non-physician practitioner's note, except where noted. All available labs and Radiology studies were reviewed. I was present for key portions of any procedure(s) performed by the resident/non-physician practitioner and I was immediately available to provide assistance. At this point I agree with the current assessment done in the Emergency Department. I have conducted an independent evaluation of this patient a history and physical is as follows:   The patient presents for evaluation of right-sided chest pain  That started after he sneezed 2 days ago pain is worse if he moves or takes a deep breath or coughs had no fever or chills he had no abdominal pain however   Patient has a history of cirrhosis hepatitis C that was treated successfully no history of cancer  No leg pain or leg swelling no history of DVT or PE  No  history of CAD   Exam patient is in no acute distress HEENT exam is unremarkable neck no JVD lungs with diminished breath sounds in right base tenderness over the right anterior lateral rib cage lower ribs no crepitation noted no skin rash abdomen soft positive bowel sounds  Impression likely rib fracture or torn cartilage in the rib cage    ED Course         Critical Care Time  Procedures

## 2023-07-19 NOTE — ED PROVIDER NOTES
History  Chief Complaint   Patient presents with   • Rib Pain     Pt c/o left sided lower rib pain. Pt believes he broke it while coughing. States it feels like the last time he broke a rib. Pain started 2x days ago. Patient is a 60-year-old male presenting to the emergency department for evaluation of left-sided lower rib pain. Patient states this happened to him last year when he had a coughing fit and resulted in him breaking his right rib. Patient states it started 2 days ago. He does have past medical history significant for alcoholic cirrhosis, hepatitis. Patient also notes extensive weight loss over the past several weeks unintentional.  He denies any headache dizziness tinnitus vision change neck pain back pain numbness or tingling in any of his extremities he denies any nausea vomiting diarrhea or constipation denies any dysuria or hematuria. Patient also notes a dry cough that is progressively gotten worse. Prior to Admission Medications   Prescriptions Last Dose Informant Patient Reported? Taking?    Blood Pressure KIT  Self No No   Sig: Use daily   Sofosbuvir-Velpatasvir (EPCLUSA) 400-100 MG tablet   No No   Sig: Take 1 tablet by mouth daily   albuterol (PROVENTIL HFA,VENTOLIN HFA) 90 mcg/act inhaler  Self No No   Sig: TAKE 2 PUFFS BY MOUTH EVERY 6 HOURS AS NEEDED FOR WHEEZE OR FOR SHORTNESS OF BREATH   bisacodyl (DULCOLAX) 5 mg EC tablet   No No   Sig: Take 4 tablets (20 mg total) by mouth 1 (one) time for 1 dose   lisinopril (ZESTRIL) 20 mg tablet  Self No No   Sig: TAKE 2 TABLETS BY MOUTH EVERY DAY   nadolol (CORGARD) 20 mg tablet   No No   Sig: TAKE 1/2 TABLET BY MOUTH DAILY   polyethylene glycol (GaviLyte-C) 4000 mL solution   No No   Sig: Take 4,000 mL by mouth once for 1 dose      Facility-Administered Medications: None       Past Medical History:   Diagnosis Date   • Arthritis    • Back pain, chronic    • Cirrhosis (HCC)    • Hepatitis     C   • Hepatitis C    • Lyme disease Past Surgical History:   Procedure Laterality Date   • ESOPHAGOGASTRODUODENOSCOPY N/A 3/8/2018    Procedure: ESOPHAGOGASTRODUODENOSCOPY (EGD); Surgeon: Marilin Martinez MD;  Location: [facility]; Service: Gastroenterology   • WRIST SURGERY Right     8/8/88       Family History   Problem Relation Age of Onset   • Colon cancer Mother      I have reviewed and agree with the history as documented. E-Cigarette/Vaping   • E-Cigarette Use Never User      E-Cigarette/Vaping Substances   • Nicotine No    • THC No    • CBD No    • Flavoring No    • Other No    • Unknown No      Social History     Tobacco Use   • Smoking status: Every Day     Packs/day: 1.00     Years: 36.00     Total pack years: 36.00     Types: Cigarettes   • Smokeless tobacco: Never   Vaping Use   • Vaping Use: Never used   Substance Use Topics   • Alcohol use: Not Currently     Comment: nope quite, had 2 beers a few days ago   • Drug use: No     Comment: Hx of drug abuse        Review of Systems   Constitutional: Positive for fatigue and unexpected weight change. Negative for activity change, chills and fever. HENT: Negative for dental problem, ear pain and sore throat. Eyes: Negative for photophobia, pain and visual disturbance. Respiratory: Positive for cough. Negative for chest tightness, shortness of breath and wheezing. Cardiovascular: Negative for chest pain and palpitations. Gastrointestinal: Negative for abdominal pain, constipation, diarrhea, nausea and vomiting. Genitourinary: Negative for dysuria and hematuria. Musculoskeletal: Negative for arthralgias, back pain, myalgias and neck pain. Skin: Negative for color change and rash. Neurological: Negative for dizziness, seizures, syncope, facial asymmetry, weakness and light-headedness. Psychiatric/Behavioral: Negative for agitation and behavioral problems. All other systems reviewed and are negative.       Physical Exam  ED Triage Vitals   Temperature Pulse Respirations Blood Pressure SpO2   07/18/23 2107 07/18/23 2107 07/18/23 2107 07/18/23 2107 07/18/23 2107   (!) 96.6 °F (35.9 °C) 84 18 (!) 179/101 94 %      Temp Source Heart Rate Source Patient Position - Orthostatic VS BP Location FiO2 (%)   07/18/23 2107 07/18/23 2107 07/18/23 2107 07/18/23 2107 --   Temporal Monitor Lying Left arm       Pain Score       07/18/23 2237       9             Orthostatic Vital Signs  Vitals:    07/18/23 2254 07/18/23 2300 07/19/23 0000 07/19/23 0015   BP: 160/83 150/80 129/78    Pulse:  82 86 84   Patient Position - Orthostatic VS:  Lying         Physical Exam  Vitals and nursing note reviewed. Constitutional:       General: He is not in acute distress. Appearance: Normal appearance. He is well-developed. He is ill-appearing. Comments: Chronically ill appearing   HENT:      Head: Normocephalic and atraumatic. Right Ear: External ear normal.      Left Ear: External ear normal.      Nose: Nose normal.      Mouth/Throat:      Mouth: Mucous membranes are moist.   Eyes:      Extraocular Movements: Extraocular movements intact. Conjunctiva/sclera: Conjunctivae normal.   Cardiovascular:      Rate and Rhythm: Normal rate and regular rhythm. Pulses: Normal pulses. Heart sounds: Normal heart sounds. No murmur heard. Pulmonary:      Effort: Pulmonary effort is normal. No respiratory distress. Breath sounds: Normal breath sounds. Abdominal:      General: Abdomen is flat. There is distension. Palpations: Abdomen is soft. Tenderness: There is no abdominal tenderness. There is no guarding or rebound. Musculoskeletal:         General: No swelling. Normal range of motion. Cervical back: Normal range of motion and neck supple. No rigidity. Right lower leg: No edema. Left lower leg: No edema. Skin:     General: Skin is warm and dry. Capillary Refill: Capillary refill takes 2 to 3 seconds.    Neurological:      General: No focal deficit present. Mental Status: He is alert and oriented to person, place, and time.    Psychiatric:         Mood and Affect: Mood normal.         Behavior: Behavior normal.         ED Medications  Medications   iohexol (OMNIPAQUE) 350 MG/ML injection (SINGLE-DOSE) 100 mL (100 mL Intravenous Given 7/18/23 2230)   HYDROmorphone HCl (DILAUDID) injection 0.2 mg (0.2 mg Intravenous Given 7/18/23 2237)       Diagnostic Studies  Results Reviewed     Procedure Component Value Units Date/Time    HS Troponin 0hr (reflex protocol) [[id number]]  (Normal) Collected: 07/18/23 2146    Lab Status: Final result Specimen: Blood from Arm, Left Updated: 07/18/23 2226     hs TnI 0hr 5 ng/L     Comprehensive metabolic panel [[id number]]  (Abnormal) Collected: 07/18/23 2146    Lab Status: Final result Specimen: Blood from Arm, Left Updated: 07/18/23 2216     Sodium 134 mmol/L      Potassium 3.8 mmol/L      Chloride 105 mmol/L      CO2 24 mmol/L      ANION GAP 5 mmol/L      BUN 10 mg/dL      Creatinine 0.89 mg/dL      Glucose 117 mg/dL      Calcium 10.5 mg/dL      AST 23 U/L      ALT 21 U/L      Alkaline Phosphatase 110 U/L      Total Protein 8.6 g/dL      Albumin 3.5 g/dL      Total Bilirubin 1.38 mg/dL      eGFR 92 ml/min/1.73sq m     Narrative:      [facility] guidelines for Chronic Kidney Disease (CKD):   •  Stage 1 with normal or high GFR (GFR > 90 mL/min/1.73 square meters)  •  Stage 2 Mild CKD (GFR = 60-89 mL/min/1.73 square meters)  •  Stage 3A Moderate CKD (GFR = 45-59 mL/min/1.73 square meters)  •  Stage 3B Moderate CKD (GFR = 30-44 mL/min/1.73 square meters)  •  Stage 4 Severe CKD (GFR = 15-29 mL/min/1.73 square meters)  •  Stage 5 End Stage CKD (GFR <15 mL/min/1.73 square meters)  Note: GFR calculation is accurate only with a steady state creatinine    CBC and differential [[id number]]  (Abnormal) Collected: 07/18/23 2146    Lab Status: Final result Specimen: Blood from Arm, Left Updated: 07/18/23 2153 WBC 10.22 Thousand/uL      RBC 4.95 Million/uL      Hemoglobin 15.6 g/dL      Hematocrit 45.6 %      MCV 92 fL      MCH 31.5 pg      MCHC 34.2 g/dL      RDW 14.7 %      MPV 10.1 fL      Platelets 204 Thousands/uL      nRBC 0 /100 WBCs      Neutrophils Relative 64 %      Immat GRANS % 0 %      Lymphocytes Relative 16 %      Monocytes Relative 18 %      Eosinophils Relative 1 %      Basophils Relative 1 %      Neutrophils Absolute 6.55 Thousands/µL      Immature Grans Absolute 0.01 Thousand/uL      Lymphocytes Absolute 1.65 Thousands/µL      Monocytes Absolute 1.84 Thousand/µL      Eosinophils Absolute 0.12 Thousand/µL      Basophils Absolute 0.05 Thousands/µL                  CT chest abdomen pelvis w contrast    (Results Pending)         Procedures  Procedures      ED Course  ED Course as of 07/19/23 0106   Tue Jul 18, 2023 2130 Blood Pressure(!): 179/101   2130 Temperature(!): 96.6 °F (35.9 °C)   2130 Temp Source: Temporal   2130 Pulse: 84   2130 Respirations: 18   2130 SpO2: 94 %   2150 Patient is a chronically ill-appearing 73-year-old male with extensive past medical history presenting to the emergency department for evaluation of left-sided lower rib pain. Patient states it occurred after he coughed and sneezed two days ago on exam patient is diffusely tender to palpation no rashes over the area. Patient is hypertensive in the room otherwise vitals within normal limits lungs are clear to auscultation bilaterally. We will evaluate patient for ACS with troponin as well as EKG as a cause of this chest pain as well as electrolyte abnormality with a CMP will evaluate for anemia leukocytosis with CBC we will evaluate for fractures effusions or pneumonia as a cause with CT chest abdomen pelvis. Patient with recent weight loss unintentional doing chest abdomen pelvis to rule out cancer. Patient is aware is no questions or concerns we will frequently reevaluate.    2154 WBC(!): 10.22   2154 Platelet Count(!): [address](!): 1.38  Elevated from prior   2229 hs TnI 0hr: 5   2234 Pending scan, will treat patients pain. Patient aware of all labs. [address] radiology room for official reads. Wed Jul 19, 2023   0101 Called again for scan to be read   0102 VRADS read. Patient adivsed to follow up with primary care. Patient was informed off all the lab and imaging findings. Patient is resting comfortably advised to use over-the-counter medication for symptomatic relief. Patient is advised to come back to the hospital if develops any new or worsening concerning symptoms patient is aware he has no questions or concerns he is advised to schedule follow-up appoint with his PCP. Patient is stable for discharge. SBIRT 20yo+    Flowsheet Row Most Recent Value   Initial Alcohol Screen: US AUDIT-C     1. How often do you have a drink containing alcohol? 0 Filed at: 07/18/2023 2109   2. How many drinks containing alcohol do you have on a typical day you are drinking? 0 Filed at: 07/18/2023 2109   3a. Male UNDER 65: How often do you have five or more drinks on one occasion? 0 Filed at: 07/18/2023 2109   Audit-C Score 0 Filed at: 07/18/2023 2109   SHREYAS: How many times in the past year have you. .. Used an illegal drug or used a prescription medication for non-medical reasons? Never Filed at: 07/18/2023 2109                MDM      Disposition  Final diagnoses:   Rib pain on left side     Time reflects when diagnosis was documented in both MDM as applicable and the Disposition within this note     Time User Action Codes Description Comment    7/19/2023  1:03 AM Dudley Bowles Add [R07.81] Rib pain on left side       ED Disposition     ED Disposition   Discharge    Condition   Stable    Date/Time   Wed Jul 19, 2023  1:03 AM    Comment   Shefali White discharge to home/self care.                Follow-up Information     Follow up With Specialties Details Why Contact Info Additional Information    Candy Mccrary DO Pulmonary Disease, Critical Care Medicine Schedule an appointment as soon as possible for a visit  for follow up [address] [phone]       [facility] Emergency Department Emergency Medicine Go to  As needed, If symptoms worsen [address] [id number]  [facility] Emergency Department, [address] [facility]          Patient's Medications   Discharge Prescriptions    No medications on file     No discharge procedures on file. PDMP Review     None           ED Provider  Attending physically available and evaluated Sharmaine Garcia. I managed the patient along with the ED Attending.     Electronically Signed by         Yamilka Garcia DO  07/19/23 0104

## 2023-07-25 ENCOUNTER — ANESTHESIA EVENT (OUTPATIENT)
Dept: GASTROENTEROLOGY | Facility: HOSPITAL | Age: 63
End: 2023-07-25

## 2023-07-25 ENCOUNTER — ANESTHESIA (OUTPATIENT)
Dept: GASTROENTEROLOGY | Facility: HOSPITAL | Age: 63
End: 2023-07-25

## 2023-07-25 ENCOUNTER — HOSPITAL ENCOUNTER (OUTPATIENT)
Dept: GASTROENTEROLOGY | Facility: HOSPITAL | Age: 63
Setting detail: OUTPATIENT SURGERY
Discharge: HOME | End: 2023-07-25
Attending: INTERNAL MEDICINE | Admitting: INTERNAL MEDICINE
Payer: MEDICARE

## 2023-07-25 VITALS
RESPIRATION RATE: 18 BRPM | HEART RATE: 90 BPM | SYSTOLIC BLOOD PRESSURE: 125 MMHG | OXYGEN SATURATION: 98 % | DIASTOLIC BLOOD PRESSURE: 81 MMHG | TEMPERATURE: 96.7 F

## 2023-07-25 DIAGNOSIS — I85.00 ESOPHAGEAL VARICES WITHOUT BLEEDING, UNSPECIFIED ESOPHAGEAL VARICES TYPE (HCC): ICD-10-CM

## 2023-07-25 DIAGNOSIS — Z12.11 ENCOUNTER FOR SCREENING COLONOSCOPY: ICD-10-CM

## 2023-07-25 DIAGNOSIS — K70.31 ALCOHOLIC CIRRHOSIS OF LIVER WITH ASCITES (HCC): Chronic | ICD-10-CM

## 2023-07-25 DIAGNOSIS — I85.00 VARICES, ESOPHAGEAL (HCC): ICD-10-CM

## 2023-07-25 DIAGNOSIS — K20.90 ESOPHAGITIS: Primary | ICD-10-CM

## 2023-07-25 PROCEDURE — 43235 EGD DIAGNOSTIC BRUSH WASH: CPT | Performed by: INTERNAL MEDICINE

## 2023-07-25 PROCEDURE — 45378 DIAGNOSTIC COLONOSCOPY: CPT | Performed by: INTERNAL MEDICINE

## 2023-07-25 RX ORDER — LIDOCAINE HYDROCHLORIDE 10 MG/ML
INJECTION, SOLUTION EPIDURAL; INFILTRATION; INTRACAUDAL; PERINEURAL AS NEEDED
Status: DISCONTINUED | OUTPATIENT
Start: 2023-07-25 | End: 2023-07-25

## 2023-07-25 RX ORDER — OMEPRAZOLE 40 MG/1
40 CAPSULE, DELAYED RELEASE ORAL EVERY 12 HOURS
Qty: 180 CAPSULE | Refills: 0 | Status: SHIPPED | OUTPATIENT
Start: 2023-07-25 | End: 2023-10-23

## 2023-07-25 RX ORDER — GLYCOPYRROLATE 0.2 MG/ML
INJECTION INTRAMUSCULAR; INTRAVENOUS AS NEEDED
Status: DISCONTINUED | OUTPATIENT
Start: 2023-07-25 | End: 2023-07-25

## 2023-07-25 RX ORDER — NADOLOL 20 MG/1
40 TABLET ORAL DAILY
Qty: 60 TABLET | Refills: 1 | Status: SHIPPED | OUTPATIENT
Start: 2023-07-25

## 2023-07-25 RX ORDER — SODIUM CHLORIDE 9 MG/ML
INJECTION, SOLUTION INTRAVENOUS CONTINUOUS PRN
Status: DISCONTINUED | OUTPATIENT
Start: 2023-07-25 | End: 2023-07-25

## 2023-07-25 RX ORDER — PROPOFOL 10 MG/ML
INJECTION, EMULSION INTRAVENOUS AS NEEDED
Status: DISCONTINUED | OUTPATIENT
Start: 2023-07-25 | End: 2023-07-25

## 2023-07-25 RX ORDER — PROPOFOL 10 MG/ML
INJECTION, EMULSION INTRAVENOUS CONTINUOUS PRN
Status: DISCONTINUED | OUTPATIENT
Start: 2023-07-25 | End: 2023-07-25

## 2023-07-25 RX ADMIN — PROPOFOL 150 MG: 10 INJECTION, EMULSION INTRAVENOUS at 12:44

## 2023-07-25 RX ADMIN — LIDOCAINE HYDROCHLORIDE 25 MG: 10 INJECTION, SOLUTION EPIDURAL; INFILTRATION; INTRACAUDAL; PERINEURAL at 12:44

## 2023-07-25 RX ADMIN — SODIUM CHLORIDE: 0.9 INJECTION, SOLUTION INTRAVENOUS at 12:44

## 2023-07-25 RX ADMIN — GLYCOPYRROLATE 0.1 MCG: 0.2 INJECTION, SOLUTION INTRAMUSCULAR; INTRAVENOUS at 12:44

## 2023-07-25 RX ADMIN — PROPOFOL 150 MCG/KG/MIN: 10 INJECTION, EMULSION INTRAVENOUS at 12:44

## 2023-07-25 NOTE — ANESTHESIA PREPROCEDURE EVALUATION
Procedure:  EGD  COLONOSCOPY    Relevant Problems   CARDIO   (+) Essential hypertension      GI/HEPATIC   (+) Chronic hepatitis C virus infection (HCC)   (+) GERD (gastroesophageal reflux disease)   (+) GI bleed   (+) Hepatic cirrhosis (HCC)      HEMATOLOGY   (+) Anemia   (+) Blood loss anemia        TTE 3/8/2018  LEFT VENTRICLE:  Systolic function was normal by visual assessment. Ejection fraction was estimated to be 65 %. There were no regional wall motion abnormalities. Wall thickness was at the upper limits of normal.  Doppler parameters were consistent with abnormal left ventricular relaxation (grade 1 diastolic dysfunction).     LEFT ATRIUM:  The atrium was mildly dilated.     RIGHT ATRIUM:  The atrium was mildly dilated.     MITRAL VALVE:  There was mild regurgitation.       Physical Exam    Airway    Mallampati score: III  TM Distance: <3 FB  Neck ROM: full     Dental       Cardiovascular      Pulmonary      Other Findings        Anesthesia Plan  ASA Score- 2     Anesthesia Type- IV sedation with anesthesia with ASA Monitors. Additional Monitors:   Airway Plan:           Plan Factors-Exercise tolerance (METS): >4 METS. Chart reviewed. EKG reviewed. Existing labs reviewed. Patient summary reviewed. Patient is a current smoker. Induction- intravenous. Postoperative Plan-     Informed Consent- Anesthetic plan and risks discussed with patient. I personally reviewed this patient with the CRNA. Discussed and agreed on the Anesthesia Plan with the CRNA. James Nunn

## 2023-07-25 NOTE — ANESTHESIA POSTPROCEDURE EVALUATION
Post-Op Assessment Note    CV Status:  Stable  Pain Score: 0    Pain management: adequate     Mental Status:  Alert and awake   Hydration Status:  Euvolemic and stable   PONV Controlled:  Controlled   Airway Patency:  Patent   Two or more mitigation strategies used for obstructive sleep apnea   Post Op Vitals Reviewed: Yes      Staff: CRNA         No notable events documented.     /67 (07/25/23 1312)    Temp (!) 96.7 °F (35.9 °C) (07/25/23 1312)    Pulse 91 (07/25/23 1312)   Resp 18 (07/25/23 1312)    SpO2 97 % (07/25/23 1312)

## 2023-07-25 NOTE — H&P
History and Physical -  [facility]  Bambi Soto 65 y.o. male MRN: [id number]                  HPI: Bambi oSto is a 65y.o. year old male who presents for screening for varices and colon cancer. REVIEW OF SYSTEMS: Per the HPI, and otherwise unremarkable. Historical Information   Past Medical History:   Diagnosis Date   • Arthritis    • Back pain, chronic    • Cirrhosis ([address])    • Hepatitis     C   • Hepatitis C    • Hypertension    • Lyme disease      Past Surgical History:   Procedure Laterality Date   • ESOPHAGOGASTRODUODENOSCOPY N/A 3/8/2018    Procedure: ESOPHAGOGASTRODUODENOSCOPY (EGD); Surgeon: Austen Garcia MD;  Location: [facility];   Service: Gastroenterology   • WRIST SURGERY Right     8/8/88     Social History   Social History     Substance and Sexual Activity   Alcohol Use Not Currently    Comment: ian quite, had 2 beers a few days ago     Social History     Substance and Sexual Activity   Drug Use No    Comment: Hx of drug abuse     Social History     Tobacco Use   Smoking Status Every Day   • Packs/day: 1.00   • Years: 36.00   • Total pack years: 36.00   • Types: Cigarettes   Smokeless Tobacco Never     Family History   Problem Relation Age of Onset   • Colon cancer Mother        Meds/Allergies       Current Outpatient Medications:   •  albuterol (PROVENTIL HFA,VENTOLIN HFA) 90 mcg/act inhaler  •  bisacodyl (DULCOLAX) 5 mg EC tablet  •  lisinopril (ZESTRIL) 20 mg tablet  •  nadolol (CORGARD) 20 mg tablet  •  polyethylene glycol (GaviLyte-C) 4000 mL solution  •  Blood Pressure KIT  •  Sofosbuvir-Velpatasvir (EPCLUSA) 400-100 MG tablet    Allergies   Allergen Reactions   • Bee Venom Swelling   • Codeine        Objective     /86   Pulse 75   Temp (!) 96.8 °F (36 °C) (Tympanic)   Resp 18   SpO2 96%       PHYSICAL EXAM    Gen: NAD  Head: NCAT  CV: RRR  CHEST: Clear  ABD: soft, NT/ND  EXT: no edema      ASSESSMENT/PLAN:  This is a 65y.o. year old male here for egd and colonoscopy, and he is stable and optimized for his procedure.

## 2023-08-31 DIAGNOSIS — I10 ESSENTIAL HYPERTENSION: ICD-10-CM

## 2023-09-05 RX ORDER — LISINOPRIL 20 MG/1
TABLET ORAL
Qty: 90 TABLET | Refills: 3 | OUTPATIENT
Start: 2023-09-05

## 2023-10-21 DIAGNOSIS — I10 ESSENTIAL HYPERTENSION: ICD-10-CM

## 2023-10-23 RX ORDER — LISINOPRIL 20 MG/1
TABLET ORAL
Qty: 90 TABLET | Refills: 3 | Status: SHIPPED | OUTPATIENT
Start: 2023-10-23

## 2024-02-21 PROBLEM — Z12.11 SCREENING FOR COLON CANCER: Status: RESOLVED | Noted: 2019-08-01 | Resolved: 2024-02-21

## 2024-02-21 PROBLEM — K22.6 MALLORY-WEISS TEAR: Status: RESOLVED | Noted: 2018-03-15 | Resolved: 2024-02-21

## 2024-02-27 ENCOUNTER — PREP FOR PROCEDURE (OUTPATIENT)
Dept: GASTROENTEROLOGY | Facility: MEDICAL CENTER | Age: 64
End: 2024-02-27

## 2024-02-27 DIAGNOSIS — Z12.11 SCREENING FOR COLON CANCER: Primary | ICD-10-CM

## 2024-02-27 DIAGNOSIS — Z12.11 ENCOUNTER FOR SCREENING COLONOSCOPY: ICD-10-CM

## 2024-02-27 DIAGNOSIS — I85.00 ESOPHAGEAL VARICES WITHOUT BLEEDING, UNSPECIFIED ESOPHAGEAL VARICES TYPE (HCC): ICD-10-CM

## 2024-02-27 DIAGNOSIS — I85.00 ESOPHAGEAL VARICES WITHOUT BLEEDING, UNSPECIFIED ESOPHAGEAL VARICES TYPE (HCC): Primary | ICD-10-CM

## 2024-02-27 RX ORDER — POLYETHYLENE GLYCOL 3350, SODIUM SULFATE ANHYDROUS, SODIUM BICARBONATE, SODIUM CHLORIDE, POTASSIUM CHLORIDE 236; 22.74; 6.74; 5.86; 2.97 G/4L; G/4L; G/4L; G/4L; G/4L
4000 POWDER, FOR SOLUTION ORAL ONCE
Qty: 4000 ML | Refills: 0 | Status: SHIPPED | OUTPATIENT
Start: 2024-02-27 | End: 2024-02-28

## 2024-02-27 RX ORDER — POLYETHYLENE GLYCOL 3350 17 G/17G
238 POWDER, FOR SOLUTION ORAL ONCE
Qty: 238 G | Refills: 0 | Status: SHIPPED | OUTPATIENT
Start: 2024-02-27 | End: 2024-02-27

## 2024-02-27 RX ORDER — POLYETHYLENE GLYCOL-3350 AND ELECTROLYTES WITH FLAVOR PACK 240; 5.84; 2.98; 6.72; 22.72 G/278.26G; G/278.26G; G/278.26G; G/278.26G; G/278.26G
4000 POWDER, FOR SOLUTION ORAL ONCE
Qty: 4000 ML | Refills: 0 | Status: SHIPPED | OUTPATIENT
Start: 2024-02-27 | End: 2024-02-27

## 2024-02-28 RX ORDER — POLYETHYLENE GLYCOL-3350 AND ELECTROLYTES WITH FLAVOR PACK 240; 5.84; 2.98; 6.72; 22.72 G/278.26G; G/278.26G; G/278.26G; G/278.26G; G/278.26G
4000 POWDER, FOR SOLUTION ORAL ONCE
Qty: 4000 ML | Refills: 0 | Status: SHIPPED | OUTPATIENT
Start: 2024-02-28 | End: 2024-02-28

## 2024-03-08 ENCOUNTER — TELEPHONE (OUTPATIENT)
Dept: GASTROENTEROLOGY | Facility: CLINIC | Age: 64
End: 2024-03-08

## 2024-03-08 NOTE — TELEPHONE ENCOUNTER
Have been calling this patient since 02/15/2024 and LM on VM 2 x's   Have sent letter asking to call us and schedule procedures . Left message and gave phone # for him to call back and schedule.

## (undated) DEVICE — NEEDLE SCLERO INTERJECT 25G 240MM

## (undated) DEVICE — WORKING LENGTH 235CM, WORKING CHANNEL 2.8MM: Brand: RESOLUTION 360 CLIP